# Patient Record
Sex: MALE | Race: WHITE | Employment: OTHER | URBAN - METROPOLITAN AREA
[De-identification: names, ages, dates, MRNs, and addresses within clinical notes are randomized per-mention and may not be internally consistent; named-entity substitution may affect disease eponyms.]

---

## 2017-04-04 ENCOUNTER — HOSPITAL ENCOUNTER (OUTPATIENT)
Dept: OTHER | Age: 82
Discharge: HOME | End: 2017-04-04

## 2017-04-04 DIAGNOSIS — Z00.6 CLINICAL TRIAL EXAM: ICD-10-CM

## 2018-04-17 ENCOUNTER — HOSPITAL ENCOUNTER (OUTPATIENT)
Dept: OTHER | Age: 83
Discharge: HOME | End: 2018-04-17

## 2018-04-17 DIAGNOSIS — Z00.6 CLINICAL TRIAL EXAM: ICD-10-CM

## 2018-08-01 ENCOUNTER — HOSPITAL ENCOUNTER (OUTPATIENT)
Dept: OTHER | Age: 83
Discharge: HOME | End: 2018-08-01
Attending: INTERNAL MEDICINE | Admitting: INTERNAL MEDICINE

## 2018-08-01 DIAGNOSIS — S43.101A CLOSED DISLOCATION OF RIGHT CLAVICLE, INITIAL ENCOUNTER: ICD-10-CM

## 2018-08-30 ENCOUNTER — HOSPITAL ENCOUNTER (OUTPATIENT)
Dept: OTHER | Age: 83
Discharge: HOME | End: 2018-08-30
Attending: INTERNAL MEDICINE | Admitting: INTERNAL MEDICINE

## 2018-08-30 DIAGNOSIS — M54.32 BACK PAIN WITH LEFT-SIDED SCIATICA: ICD-10-CM

## 2019-04-05 ENCOUNTER — TELEPHONE (OUTPATIENT)
Dept: FAMILY MEDICINE CLINIC | Age: 84
End: 2019-04-05

## 2019-04-08 NOTE — TELEPHONE ENCOUNTER
CALLED AND SPOKE TO WIFE AND SHE WILL CALL THE FRONT OFFICE AND SCHEDULE NEW PATIENT APPT FOR HER .  SC

## 2019-04-25 ENCOUNTER — HOSPITAL ENCOUNTER (EMERGENCY)
Age: 84
Discharge: HOME | End: 2019-04-25
Attending: EMERGENCY MEDICINE
Payer: MEDICARE

## 2019-04-25 VITALS
OXYGEN SATURATION: 95 % | BODY MASS INDEX: 37.05 KG/M2 | SYSTOLIC BLOOD PRESSURE: 151 MMHG | TEMPERATURE: 98.8 F | DIASTOLIC BLOOD PRESSURE: 71 MMHG | HEART RATE: 85 BPM | HEIGHT: 64 IN | WEIGHT: 217 LBS | RESPIRATION RATE: 16 BRPM

## 2019-04-25 DIAGNOSIS — R60.9 PERIPHERAL EDEMA: Primary | ICD-10-CM

## 2019-04-25 LAB
A/G RATIO: 1.3 (ref 1.1–2.2)
ALBUMIN SERPL-MCNC: 4.1 G/DL (ref 3.4–5)
ALP BLD-CCNC: 54 U/L (ref 40–129)
ALT SERPL-CCNC: 18 U/L (ref 10–40)
ANION GAP SERPL CALCULATED.3IONS-SCNC: 10 MMOL/L (ref 3–16)
AST SERPL-CCNC: 19 U/L (ref 15–37)
BILIRUB SERPL-MCNC: 0.5 MG/DL (ref 0–1)
BUN BLDV-MCNC: 19 MG/DL (ref 7–20)
CALCIUM SERPL-MCNC: 9.1 MG/DL (ref 8.3–10.6)
CHLORIDE BLD-SCNC: 104 MMOL/L (ref 99–110)
CO2: 29 MMOL/L (ref 21–32)
CREAT SERPL-MCNC: 0.9 MG/DL (ref 0.8–1.3)
GFR AFRICAN AMERICAN: >60
GFR NON-AFRICAN AMERICAN: >60
GLOBULIN: 3.2 G/DL
GLUCOSE BLD-MCNC: 102 MG/DL (ref 70–99)
POTASSIUM SERPL-SCNC: 4.4 MMOL/L (ref 3.5–5.1)
SODIUM BLD-SCNC: 143 MMOL/L (ref 136–145)
TOTAL PROTEIN: 7.3 G/DL (ref 6.4–8.2)

## 2019-04-25 PROCEDURE — 99283 EMERGENCY DEPT VISIT LOW MDM: CPT

## 2019-04-25 PROCEDURE — 80053 COMPREHEN METABOLIC PANEL: CPT

## 2019-04-25 RX ORDER — FUROSEMIDE 20 MG/1
TABLET ORAL
COMMUNITY
Start: 2019-04-04 | End: 2019-06-25 | Stop reason: SDUPTHER

## 2019-04-25 ASSESSMENT — ENCOUNTER SYMPTOMS
BACK PAIN: 0
NAUSEA: 0
COLOR CHANGE: 0
CONSTIPATION: 0
DIARRHEA: 0
VOMITING: 0
ABDOMINAL PAIN: 0
SHORTNESS OF BREATH: 0

## 2019-04-25 NOTE — ED PROVIDER NOTES
[address] [facility]  eMERGENCY dEPARTMENT eNCOUnter        Pt Name: Jeremias Cardona  MRN: [id number]  Armsirinagfrenato 5/14/1930  Date of evaluation: 4/25/2019  Provider: MARCIE Lr  PCP: Reese Ceballos DO  ED Attending: Eagle Holliday MD    CHIEF COMPLAINT       Chief Complaint   Patient presents with    Leg Swelling     pt with c/o bi-lateral feet/leg swelling ongoing for past few weeks- has PCP appt on Tues. states started on Lasix recently- feels it was too late of a start - seeing Dr. Meg Burks now- was [facility]- no c/o sob or pain        HISTORY OF PRESENT ILLNESS   (Location/Symptom, Timing/Onset, Context/Setting, Quality, Duration, Modifying Factors, Severity)  Note limiting factors. Jeremias Cardona is a 80 y.o. male who presents to the emergency department with complaints of bilateral lower extremity swelling that has worsened over the past few weeks. He has had recent change from hydrochlorothiazide, 20 mg to Lasix 20 mg daily. Followed up with his PCP which was an nurse practitioner and states blood work was obtained and this is when he was started on a diuretic. Has a follow-up appointment coming up next week with Dr. Pro Benson. Denies any symptoms associated with the lateral lower extremity swelling. No erythema, warmth or unilateral leg swelling. Denies chest pain or shortness of breath. He states that the swelling seems to improve if he keeps his legs elevated. No prior history of congestive heart failure. Nursing Notes were all reviewed and agreed with or any disagreements were addressed  in the HPI. REVIEW OF SYSTEMS    (2-9 systems for level 4, 10 or more for level 5)     Review of Systems   Constitutional: Negative for fever. Respiratory: Negative for shortness of breath. Cardiovascular: Positive for leg swelling (bilateral). Negative for chest pain. Gastrointestinal: Negative for abdominal pain, constipation, diarrhea, nausea and vomiting. Musculoskeletal: Negative for back pain. Skin: Negative for color change and rash. All other systems reviewed and are negative. Positives and pertinent negatives as per HPI. All other systems were reviewed and are negative. PHYSICAL EXAM    (up to 7 for level 4, 8 or more for level 5)     ED Triage Vitals [04/25/19 1635]   BP Temp Temp Source Pulse Resp SpO2 Height Weight   (!) 157/75 98.8 °F (37.1 °C) Infrared 85 16 97 % 5' 4\" (1.626 m) 217 lb (98.4 kg)       Physical Exam   Constitutional: He is oriented to person, place, and time. He appears well-developed and well-nourished. He is active and cooperative. Non-toxic appearance. HENT:   Head: Normocephalic. Right Ear: External ear normal.   Left Ear: External ear normal.   Nose: Nose normal.   Eyes: Conjunctivae are normal. Right eye exhibits no discharge. Left eye exhibits no discharge. Neck: Normal range of motion. Neck supple. Cardiovascular: Normal rate, regular rhythm and normal heart sounds. Exam reveals no gallop and no friction rub. No murmur heard. 2+ pitting edema below the knee bilaterally. Pulmonary/Chest: Effort normal and breath sounds normal. No stridor. No respiratory distress. He has no wheezes. He has no rales. Abdominal: Soft. Bowel sounds are normal. He exhibits no distension and no mass. There is no tenderness. There is no guarding. Musculoskeletal: Normal range of motion. Neurological: He is alert and oriented to person, place, and time. Skin: Skin is warm and dry. He is not diaphoretic. No pallor. No erythema or warmth to bilateral lower extremities. Psychiatric: He has a normal mood and affect. His behavior is normal.   Nursing note and vitals reviewed.       PAST MEDICAL HISTORY     Past Medical History:   Diagnosis Date    Afib ([facility] [address]     Anxiety     Atrial fibrillation (HCC)     Benign essential hypertension     Cervical back pain with evidence of disc disease     Cramps, extremity     Elevated prostate specific antigen (PSA)     history of    Emphysema     Hypercholesteremia     Kidney stones        SURGICAL HISTORY       Past Surgical History:   Procedure Laterality Date    APPENDECTOMY      COLONOSCOPY      EYE SURGERY      cataract with lens implant    HERNIA REPAIR      rt side    KNEE ARTHROSCOPY      THROAT SURGERY      uvula resected, and nasal surgery for sleep apnea    TURP  2007    VASECTOMY         CURRENT MEDICATIONS       Previous Medications    ALBUTEROL (PROAIR HFA) 108 (90 BASE) MCG/ACT INHALER    Inhale 2 puffs into the lungs every 6 hours as needed. BRIMONIDINE (ALPHAGAN P) 0.1 % SOLN    1 drop every 8 hours    BRINZOLAMIDE-BRIMONIDINE 1-0.2 % SUSP    Apply 1 drop to eye 2 times daily Both eyes    FEBUXOSTAT (ULORIC) 80 MG TABS    Take by mouth    FUROSEMIDE (LASIX) 20 MG TABLET        HYDROCODONE-ACETAMINOPHEN (NORCO) 5-325 MG PER TABLET    Take 1 tablet by mouth every 6 hours as needed for Pain. IPRATROPIUM-ALBUTEROL (DUONEB) 0.5-2.5 (3) MG/3ML SOLN NEBULIZER SOLUTION    Take 3 mLs by nebulization every 6 hours as needed for Shortness of Breath. LATANOPROST (XALATAN) 0.005 % OPHTHALMIC SOLUTION    Place 1 drop into both eyes nightly. METOPROLOL TARTRATE (LOPRESSOR) 50 MG TABLET    Take 50 mg by mouth 2 times daily    MONTELUKAST (SINGULAIR) 10 MG TABLET    Take 10 mg by mouth nightly    NAPROXEN (NAPROSYN) 500 MG TABLET    Take 1 tablet by mouth 2 times daily (with meals) As needed for heel pain. NUTRITIONAL SUPPLEMENTS (THERALITH XR PO)    Take 2 tablets by mouth 2 times daily.     POTASSIUM CHLORIDE SA (K-DUR;KLOR-CON M) 10 MEQ TABLET    TAKE 1 TABLET BY MOUTH DAILY    PRAVASTATIN (PRAVACHOL) 40 MG TABLET    TAKE 1 TABLET BY MOUTH EVERY OTHER DAY    PROBENECID (BENEMID) 500 MG TABLET    Take 500 mg by mouth 2 times daily     TRAMADOL-ACETAMINOPHEN (ULTRACET) 37.5-325 MG PER TABLET    TAKE 1 TABLET BY MOUTH THREE TIMES DAILY AS NEEDED FOR PAIN areinterpreted by the Emergency Department Physician who either signs or Co-signs this chart in the absence of a cardiologist.    RADIOLOGY:   Non-plain film images such as CT, Ultrasound and MRI are read by the radiologist. Shaylee Mora radiographicimages are visualized and preliminarily interpreted by the  ED Provider with the below findings:    Interpretation per the Radiologist below, if available at the time of this note:    No orders to display     No results found. PROCEDURES   Unless otherwisenoted below, none     Procedures    CRITICAL CARE TIME   N/A    CONSULTS:  None    EMERGENCY DEPARTMENT COURSE andDIFFERENTIAL DIAGNOSIS/MDM:   Vitals:    Vitals:    04/25/19 1635 04/25/19 1637 04/25/19 1659 04/25/19 1700   BP: (!) 157/75 (!) 181/69 (!) 167/83 (!) 157/75   Pulse: 85      Resp: 16      Temp: 98.8 °F (37.1 °C)      TempSrc: Infrared      SpO2: 97% 96% 96% 96%   Weight: 217 lb (98.4 kg)      Height: 5' 4\" (1.626 m)          Patient wasgiven the following medications:  Medications - No data to display  Patient presents emergency Department with complaints of bilateral lower extremity edema. CMP is checked and normal albumin and normal renal function. Patient is to increase his Lasix to 40 mg once daily over the next 5 days and purchase compression stockings. Encouraged to elevate legs. He is not having any chest pain or shortness of breath. Patient is appropriate for outpatient follow-up with PCP. Edema instructions for home. Vitals are currently stable for discharge. The patient tolerated their visit well. They were seen and evaluated by the attending physician, who agreed with the assessment and plan. I have discussed the findings of today's workup with the patient and addressed the patient's questions and concerns. Important warning signs as well as new orworsening symptoms which would necessitate immediate return to the ED were discussed.  The plan is to discharge from the ED at this time, and the patient is in stable condition. The patient acknowledged understanding isagreeable with this plan. FINAL IMPRESSION      1. Peripheral edema        DISPOSITION/PLAN   DISPOSITION Decision To Discharge 04/25/2019 06:03:55 PM      PATIENT REFERRED TO:  keep your scheduled appointment for this coming week.           [facility] Emergency Department  [place] [address]  [phone]  Go to   If symptoms worsen      DISCHARGE MEDICATIONS:  New Prescriptions    No medications on file       DISCONTINUED MEDICATIONS:  Discontinued Medications    No medications on file            (Please note that portions of this note were completed with a voice recognition program.  Efforts were made to edit the dictations but occasionally words aremis-transcribed.)    MARCIE Hartman (electronically signed)            MARCIE Whitley  04/25/19 8874

## 2019-04-25 NOTE — ED NOTES
Pt discharged in stable condition, VSS, no signs of distress. Discharge instructions and meds reviewed. Pt verbalizes understanding and states no further questions or concerns unaddressed.        Dinorah Dobbins RN  04/25/19 7499

## 2019-04-25 NOTE — ED PROVIDER NOTES
This patient was seen by the Mid-Level Provider. I have seen and examined the patient, agree with the workup, evaluation, management and diagnosis. Care plan has been discussed. My assessment reveals     An 51-year-old male who presents with lower extremity edema. The patient presents with symmetric bilateral lower extremity edema. He denies shortness of breath. He states his diuretics were changed several weeks ago. Exam: Bilateral symmetric lower extremity edema. 1+ pitting. Labs are obtained are unremarkable. Disposition: He'll be discharged with instructions to keep his legs up. He was given instructions to use compression stockings. He is to return if worse and follow-up with his doctor for appropriate diuretic control. Results for orders placed or performed during the hospital encounter of 04/25/19   Comprehensive Metabolic Panel   Result Value Ref Range    Sodium 143 136 - 145 mmol/L    Potassium 4.4 3.5 - 5.1 mmol/L    Chloride 104 99 - 110 mmol/L    CO2 29 21 - 32 mmol/L    Anion Gap 10 3 - 16    Glucose 102 (H) 70 - 99 mg/dL    BUN 19 7 - 20 mg/dL    CREATININE 0.9 0.8 - 1.3 mg/dL    GFR Non-African American >60 >60    GFR African American >60 >60    Calcium 9.1 8.3 - 10.6 mg/dL    Total Protein 7.3 6.4 - 8.2 g/dL    Alb 4.1 3.4 - 5.0 g/dL    Albumin/Globulin Ratio 1.3 1.1 - 2.2    Total Bilirubin 0.5 0.0 - 1.0 mg/dL    Alkaline Phosphatase 54 40 - 129 U/L    ALT 18 10 - 40 U/L    AST 19 15 - 37 U/L    Globulin 3.2 g/dL     No results found.         Aury Encinas MD  04/25/19 3963

## 2019-04-25 NOTE — CARE COORDINATION
Discharge Planning:  SW met with pt and spouse to discuss discharge needs. Pt stated he worked at [employer] and has been seeing advertisements on TV about getting assistance for workers. SW informed I have a flyer with the information and phone number. Pt stated he will get it the next time he sees it on TV. Spouse reported no needs at home at this time but stated she would like a phone number to call if needs arise in the future. SW explained the Elderly Services Program and provided spouse with number. Pt and spouse reported no other needs at this time.     Electronically signed by JESÚS Salazar, NADINE on 4/25/2019 at 6:29 PM

## 2019-04-30 ENCOUNTER — HOSPITAL ENCOUNTER (OUTPATIENT)
Dept: GENERAL RADIOLOGY | Age: 84
Discharge: HOME | End: 2019-04-30
Payer: MEDICARE

## 2019-04-30 ENCOUNTER — HOSPITAL ENCOUNTER (OUTPATIENT)
Age: 84
Discharge: HOME | End: 2019-04-30
Payer: MEDICARE

## 2019-04-30 ENCOUNTER — OFFICE VISIT (OUTPATIENT)
Dept: FAMILY MEDICINE CLINIC | Age: 84
End: 2019-04-30
Payer: MEDICARE

## 2019-04-30 VITALS
SYSTOLIC BLOOD PRESSURE: 134 MMHG | OXYGEN SATURATION: 98 % | HEIGHT: 64 IN | WEIGHT: 216 LBS | HEART RATE: 86 BPM | BODY MASS INDEX: 36.88 KG/M2 | DIASTOLIC BLOOD PRESSURE: 80 MMHG | RESPIRATION RATE: 16 BRPM

## 2019-04-30 DIAGNOSIS — Z91.81 AT HIGH RISK FOR FALLS: ICD-10-CM

## 2019-04-30 DIAGNOSIS — R97.20 HIGH PROSTATE SPECIFIC ANTIGEN (PSA): ICD-10-CM

## 2019-04-30 DIAGNOSIS — E79.0 HYPERURICEMIA: ICD-10-CM

## 2019-04-30 DIAGNOSIS — N40.0 BENIGN PROSTATIC HYPERPLASIA, UNSPECIFIED WHETHER LOWER URINARY TRACT SYMPTOMS PRESENT: ICD-10-CM

## 2019-04-30 DIAGNOSIS — I10 ESSENTIAL HYPERTENSION: Primary | ICD-10-CM

## 2019-04-30 DIAGNOSIS — M25.551 RIGHT HIP PAIN: ICD-10-CM

## 2019-04-30 DIAGNOSIS — M79.642 LEFT HAND PAIN: ICD-10-CM

## 2019-04-30 DIAGNOSIS — M51.36 DDD (DEGENERATIVE DISC DISEASE), LUMBAR: ICD-10-CM

## 2019-04-30 DIAGNOSIS — M79.89 LEG SWELLING: ICD-10-CM

## 2019-04-30 DIAGNOSIS — H91.93 BILATERAL HEARING LOSS, UNSPECIFIED HEARING LOSS TYPE: ICD-10-CM

## 2019-04-30 PROCEDURE — G8427 DOCREV CUR MEDS BY ELIG CLIN: HCPCS | Performed by: FAMILY MEDICINE

## 2019-04-30 PROCEDURE — 4040F PNEUMOC VAC/ADMIN/RCVD: CPT | Performed by: FAMILY MEDICINE

## 2019-04-30 PROCEDURE — 1036F TOBACCO NON-USER: CPT | Performed by: FAMILY MEDICINE

## 2019-04-30 PROCEDURE — G8417 CALC BMI ABV UP PARAM F/U: HCPCS | Performed by: FAMILY MEDICINE

## 2019-04-30 PROCEDURE — 73502 X-RAY EXAM HIP UNI 2-3 VIEWS: CPT

## 2019-04-30 PROCEDURE — 73130 X-RAY EXAM OF HAND: CPT

## 2019-04-30 PROCEDURE — 1123F ACP DISCUSS/DSCN MKR DOCD: CPT | Performed by: FAMILY MEDICINE

## 2019-04-30 PROCEDURE — 99203 OFFICE O/P NEW LOW 30 MIN: CPT | Performed by: FAMILY MEDICINE

## 2019-04-30 RX ORDER — AMLODIPINE BESYLATE 10 MG/1
10 TABLET ORAL DAILY
COMMUNITY
End: 2019-06-25

## 2019-04-30 RX ORDER — NIACIN 500 MG
500 TABLET ORAL
COMMUNITY
End: 2019-06-25

## 2019-04-30 ASSESSMENT — PATIENT HEALTH QUESTIONNAIRE - PHQ9
1. LITTLE INTEREST OR PLEASURE IN DOING THINGS: 0
SUM OF ALL RESPONSES TO PHQ QUESTIONS 1-9: 0
SUM OF ALL RESPONSES TO PHQ9 QUESTIONS 1 & 2: 0
SUM OF ALL RESPONSES TO PHQ QUESTIONS 1-9: 0
2. FEELING DOWN, DEPRESSED OR HOPELESS: 0

## 2019-04-30 NOTE — PROGRESS NOTES
2019    David Hewitt (:  1930) is a 80 y.o. male, here for evaluation of the following medical concerns:    HPI  Chief Complaint   Patient presents with    New Patient     NEW PATIENT [facility] [address] WORKED THERE, TREATED FOR HTN HYPERLIPIDEMIA PREVIOUS DOCTOR CONTINUES TO CHANGE HIS MEDICATION AND NEVER GIVEN REASONS WHY     Pain     PAIN AND EDEMA IN LEFT HAND FOR 2 YEARS JUST KEEPS GETTING TOLD IT IS ARTHRITIS BUT NO ONE DOES ANYTHING HE HAS PAIN IN FEET AND ANKLES WENT TO ER    PAIN IN HAND - WEATHER RELATED - MORE ACHING LAST 3 DAYS  NO HX OF HEART PROBLEMS - SEEN BY DR. Rocio Sow  SEEN BY DR. Valencia Stockton - WIFE PT HERE -SEEN BY NURSE PRACTITIONER  RIGHT HANDED - NO HX OF CHF - NO SOB - INTERESTED IN GETTING STOCKINGS  DEVELOPED SWELLING IN BOTH LEGS in last 3-4 weeks - went to ER  - ? Related to naproxen  Took naproxen for gout - base of big toes - last flare 6 months ago  HIGH PSA  holger diet. Mows grass - tries to stay active during day - sits in chair and elevates legs  Right hip pain - bothered by activity - walks on treadmill and does okay w/ elevation - problems if walks outside much. On furosemide 20/d prior to swelling - no improvement in swelling but urinating a lot. Urinating hourly after taking lasix   High psa for years - had biopsies in past - negative - psa stays around 8-9.  bm's good on miralax - neg fit testing - told too old for colonoscopy  Drinks a lot of coffee.   Vision fairly good - has glaucoma - eye drops - s/p cataract  No breathing problems - had oxygen rx'd - doesn't use - uses nebulizer prn -not very often  Never smoked  BP Readings from Last 3 Encounters:   19 134/80   19 (!) 151/71   09/15/17 (!) 151/69     Pulse Readings from Last 3 Encounters:   19 86   19 85   09/15/17 70     Wt Readings from Last 3 Encounters:   19 216 lb (98 kg)   19 217 lb (98.4 kg)   09/15/17 226 lb (102.5 kg) Current Outpatient Medications   Medication Sig Dispense Refill    amLODIPine (NORVASC) 10 MG tablet Take 10 mg by mouth daily      niacin 500 MG tablet Take 500 mg by mouth daily (with breakfast)      furosemide (LASIX) 20 MG tablet       Febuxostat (ULORIC) 80 MG TABS Take by mouth      probenecid (BENEMID) 500 MG tablet Take 500 mg by mouth 2 times daily       pravastatin (PRAVACHOL) 40 MG tablet TAKE 1 TABLET BY MOUTH EVERY OTHER DAY 45 tablet 0    potassium chloride SA (K-DUR;KLOR-CON M) 10 MEQ tablet TAKE 1 TABLET BY MOUTH DAILY 30 tablet 0    naproxen (NAPROSYN) 500 MG tablet Take 1 tablet by mouth 2 times daily (with meals) As needed for heel pain. 60 tablet 11    ipratropium-albuterol (DUONEB) 0.5-2.5 (3) MG/3ML SOLN nebulizer solution Take 3 mLs by nebulization every 6 hours as needed for Shortness of Breath. 360 mL 1    latanoprost (XALATAN) 0.005 % ophthalmic solution Place 1 drop into both eyes nightly. No current facility-administered medications for this visit. Review of Systems  Hx of afib - never had sx. No cp/palp - denies dizzy/ lightheaded except sudden movement  Fell and hit shoulder on left recently - tore muscle loose  Lives w/ wife  Sleeps well - surgery for dnia - sleeps in lazy boy   Gets nasal congestion w/ lying flat - uses breathe right strips. Prior to Visit Medications    Medication Sig Taking?  Authorizing Provider   amLODIPine (NORVASC) 10 MG tablet Take 10 mg by mouth daily Yes Historical Provider, MD   niacin 500 MG tablet Take 500 mg by mouth daily (with breakfast) Yes Historical Provider, MD   furosemide (LASIX) 20 MG tablet  Yes Historical Provider, MD   Febuxostat (ULORIC) 80 MG TABS Take by mouth Yes Historical Provider, MD   probenecid (BENEMID) 500 MG tablet Take 500 mg by mouth 2 times daily  Yes Historical Provider, MD   pravastatin (PRAVACHOL) 40 MG tablet TAKE 1 TABLET BY MOUTH EVERY OTHER DAY Yes Zhanna Tamayo, APRN - CNP   potassium chloride SA (K-DUR;KLOR-CON M) 10 MEQ tablet TAKE 1 TABLET BY MOUTH DAILY Yes SHANDA Mckinley - CNP   naproxen (NAPROSYN) 500 MG tablet Take 1 tablet by mouth 2 times daily (with meals) As needed for heel pain. Yes Layla Romero MD   ipratropium-albuterol (DUONEB) 0.5-2.5 (3) MG/3ML SOLN nebulizer solution Take 3 mLs by nebulization every 6 hours as needed for Shortness of Breath. Yes Layla Romero MD   latanoprost (XALATAN) 0.005 % ophthalmic solution Place 1 drop into both eyes nightly. Yes Historical Provider, MD    eats out a lot - wife cooks some - likes fish a lot.     Allergies   Allergen Reactions    Codeine      headache    Timolol Maleate      Short of breath       Past Medical History:   Diagnosis Date    Afib ([facility] [address]     Anxiety     Atrial fibrillation (HCC)     Benign essential hypertension     Cervical back pain with evidence of disc disease     Cramps, extremity     Elevated prostate specific antigen (PSA)     history of    Emphysema     Hypercholesteremia     Kidney stones        Past Surgical History:   Procedure Laterality Date    APPENDECTOMY      COLONOSCOPY      EYE SURGERY      cataract with lens implant    HERNIA REPAIR      rt side    KNEE ARTHROSCOPY      THROAT SURGERY      uvula resected, and nasal surgery for sleep apnea    TURP  2007    VASECTOMY         Social History     Socioeconomic History    Marital status:      Spouse name: Not on file    Number of children: Not on file    Years of education: Not on file    Highest education level: Not on file   Occupational History    Not on file   Social Needs    Financial resource strain: Not on file    Food insecurity:     Worry: Not on file     Inability: Not on file    Transportation needs:     Medical: Not on file     Non-medical: Not on file   Tobacco Use    Smoking status: Never Smoker    Smokeless tobacco: Never Used   Substance and Sexual Activity    Alcohol use: No     Alcohol/week: 0.0 oz    Drug use: No    Sexual activity: Never     Partners: Female   Lifestyle    Physical activity:     Days per week: Not on file     Minutes per session: Not on file    Stress: Not on file   Relationships    Social connections:     Talks on phone: Not on file     Gets together: Not on file     Attends Nondenominational service: Not on file     Active member of club or organization: Not on file     Attends meetings of clubs or organizations: Not on file     Relationship status: Not on file    Intimate partner violence:     Fear of current or ex partner: Not on file     Emotionally abused: Not on file     Physically abused: Not on file     Forced sexual activity: Not on file   Other Topics Concern    Not on file   Social History Narrative    Not on file        Family History   Problem Relation Age of Onset    Stroke Father 68       Vitals:    04/30/19 1035   BP: 134/80   Site: Left Upper Arm   Position: Sitting   Cuff Size: Medium Adult   Pulse: 86   Resp: 16   SpO2: 98%   Weight: 216 lb (98 kg)   Height: 5' 4\" (1.626 m)     Estimated body mass index is 37.08 kg/m² as calculated from the following:    Height as of this encounter: 5' 4\" (1.626 m). Weight as of this encounter: 216 lb (98 kg). Physical Exam   Constitutional: He appears well-developed. No distress. HENT:   Mouth/Throat: Oropharynx is clear and moist.   Eyes: Conjunctivae are normal. No scleral icterus. Cardiovascular: Normal rate, regular rhythm, normal heart sounds and intact distal pulses. Exam reveals no gallop. No murmur heard. Pulmonary/Chest: Effort normal and breath sounds normal. No respiratory distress. He has no wheezes. He has no rhonchi. He has no rales. Abdominal: Soft. Bowel sounds are normal. He exhibits no distension. There is no tenderness. Musculoskeletal: He exhibits edema (1-2+ swelling low legs bilat). Swelling/ ttp left hand   Sl antalgic gait   Neurological: He is alert. Skin: Skin is intact. No rash noted. No erythema. Psychiatric: He has a normal mood and affect. ASSESSMENT/PLAN:  There are no diagnoses linked to this encounter. Manas Weiss was seen today for new patient and pain. Diagnoses and all orders for this visit:    Essential hypertension  Stable on lasix/ norvasc  At high risk for falls  Fall precautions dw pt  High prostate specific antigen (PSA)  Stable bx negative  Benign prostatic hyperplasia, unspecified whether lower urinary tract symptoms present  Stable sp turp  Leg swelling  Compression stockings - cont lasix 20/d  Consider change amlodipine to bb/ acei if swelling still problematic - getting better w/ reduction in nsaids  Elevate/ low salt encouraged  Left hand pain  Suspect oa - await xray/ labs  -consider diclofenac  Right hip pain  Xray - suspect oa  DDD (degenerative disc disease), lumbar  Ultram prn  Bilateral hearing loss, unspecified hearing loss type  Per ent - cochlear implant  Hyperuricemia  Start uloric daily   High cholesterol - stop niacin - cont pravastatin based on last year results - [facility]    No follow-ups on file. An  electronic signature was used to authenticate this note. --Jenelle Nuñez MD on 4/30/2019 at 11:19 AM    On the basis of positive falls risk screening, assessment and plan is as follows: dw/ pt safety issues.

## 2019-05-03 ENCOUNTER — TELEPHONE (OUTPATIENT)
Dept: INTERNAL MEDICINE CLINIC | Age: 84
End: 2019-05-03

## 2019-05-03 NOTE — TELEPHONE ENCOUNTER
PA SUBMITTED FOR Diclofenac Sodium 1% TD GEL  Key: [id number] - PA Case ID: [id number]   STATUS: PENDING

## 2019-05-06 NOTE — TELEPHONE ENCOUNTER
Received APPROVAL for Diclofenac Sodium 1% TD GEL through 12/31/2019. Please advise patient. Thank you.

## 2019-05-09 LAB — PROSTATE SPECIFIC ANTIGEN: 11.28 NG/ML

## 2019-06-25 ENCOUNTER — OFFICE VISIT (OUTPATIENT)
Dept: FAMILY MEDICINE CLINIC | Age: 84
End: 2019-06-25
Payer: MEDICARE

## 2019-06-25 VITALS
SYSTOLIC BLOOD PRESSURE: 138 MMHG | WEIGHT: 212 LBS | BODY MASS INDEX: 36.19 KG/M2 | HEART RATE: 80 BPM | HEIGHT: 64 IN | RESPIRATION RATE: 14 BRPM | DIASTOLIC BLOOD PRESSURE: 82 MMHG

## 2019-06-25 DIAGNOSIS — J96.11 CHRONIC RESPIRATORY FAILURE WITH HYPOXIA (HCC): ICD-10-CM

## 2019-06-25 DIAGNOSIS — I10 ESSENTIAL HYPERTENSION: Primary | ICD-10-CM

## 2019-06-25 DIAGNOSIS — M19.049 HAND ARTHRITIS: ICD-10-CM

## 2019-06-25 DIAGNOSIS — L30.9 DERMATITIS: ICD-10-CM

## 2019-06-25 DIAGNOSIS — E78.00 HYPERCHOLESTEREMIA: ICD-10-CM

## 2019-06-25 DIAGNOSIS — J98.6 DIAPHRAGM PARALYSIS: ICD-10-CM

## 2019-06-25 DIAGNOSIS — R60.0 PEDAL EDEMA: ICD-10-CM

## 2019-06-25 DIAGNOSIS — J44.9 COPD, MILD (HCC): ICD-10-CM

## 2019-06-25 PROCEDURE — 1123F ACP DISCUSS/DSCN MKR DOCD: CPT | Performed by: FAMILY MEDICINE

## 2019-06-25 PROCEDURE — G8926 SPIRO NO PERF OR DOC: HCPCS | Performed by: FAMILY MEDICINE

## 2019-06-25 PROCEDURE — 3023F SPIROM DOC REV: CPT | Performed by: FAMILY MEDICINE

## 2019-06-25 PROCEDURE — 4040F PNEUMOC VAC/ADMIN/RCVD: CPT | Performed by: FAMILY MEDICINE

## 2019-06-25 PROCEDURE — G8417 CALC BMI ABV UP PARAM F/U: HCPCS | Performed by: FAMILY MEDICINE

## 2019-06-25 PROCEDURE — 99214 OFFICE O/P EST MOD 30 MIN: CPT | Performed by: FAMILY MEDICINE

## 2019-06-25 PROCEDURE — 1036F TOBACCO NON-USER: CPT | Performed by: FAMILY MEDICINE

## 2019-06-25 PROCEDURE — G8427 DOCREV CUR MEDS BY ELIG CLIN: HCPCS | Performed by: FAMILY MEDICINE

## 2019-06-25 RX ORDER — OLMESARTAN MEDOXOMIL AND HYDROCHLOROTHIAZIDE 40/25 40; 25 MG/1; MG/1
1 TABLET ORAL DAILY
Qty: 30 TABLET | Refills: 5 | Status: SHIPPED | OUTPATIENT
Start: 2019-06-25 | End: 2019-07-18

## 2019-06-25 RX ORDER — PRAVASTATIN SODIUM 40 MG
TABLET ORAL
Qty: 45 TABLET | Refills: 11 | Status: SHIPPED | OUTPATIENT
Start: 2019-06-25 | End: 2020-07-06

## 2019-06-25 RX ORDER — POTASSIUM CHLORIDE 750 MG/1
TABLET, EXTENDED RELEASE ORAL
Qty: 30 TABLET | Refills: 0 | Status: SHIPPED | OUTPATIENT
Start: 2019-06-25 | End: 2019-07-18 | Stop reason: SDUPTHER

## 2019-06-25 RX ORDER — FUROSEMIDE 20 MG/1
TABLET ORAL
Qty: 30 TABLET | Refills: 0 | Status: SHIPPED | OUTPATIENT
Start: 2019-06-25 | End: 2019-07-30 | Stop reason: SDUPTHER

## 2019-06-25 NOTE — PROGRESS NOTES
Subjective:      Patient ID: Leon Rose is a 80 y.o. male. HPI  Chief Complaint   Patient presents with    Hypertension     [facility] MEDICATIONS WITH [address] HE IS NOT SURE WHAT HE NEEDS TO STAY ON     Other     HE DOES NOT WANT TO DO AWV, HE IS ON [facility] AND GETS A COMPLETE PHYSICAL EVERY YEAR      BP Readings from Last 3 Encounters:   06/25/19 138/82   04/30/19 134/80   04/25/19 (!) 151/71     Pulse Readings from Last 3 Encounters:   06/25/19 80   04/30/19 86   04/25/19 85     Wt Readings from Last 3 Encounters:   06/25/19 212 lb (96.2 kg)   04/30/19 216 lb (98 kg)   04/25/19 217 lb (98.4 kg)     Current Outpatient Medications   Medication Sig Dispense Refill    amLODIPine (NORVASC) 10 MG tablet Take 10 mg by mouth daily      niacin 500 MG tablet Take 500 mg by mouth daily (with breakfast)      diclofenac sodium 1 % GEL Apply 2 g topically 4 times daily 1 Tube 1    furosemide (LASIX) 20 MG tablet       Febuxostat (ULORIC) 80 MG TABS Take by mouth      probenecid (BENEMID) 500 MG tablet Take 500 mg by mouth 2 times daily       pravastatin (PRAVACHOL) 40 MG tablet TAKE 1 TABLET BY MOUTH EVERY OTHER DAY 45 tablet 0    potassium chloride SA (K-DUR;KLOR-CON M) 10 MEQ tablet TAKE 1 TABLET BY MOUTH DAILY 30 tablet 0    naproxen (NAPROSYN) 500 MG tablet Take 1 tablet by mouth 2 times daily (with meals) As needed for heel pain. 60 tablet 11    ipratropium-albuterol (DUONEB) 0.5-2.5 (3) MG/3ML SOLN nebulizer solution Take 3 mLs by nebulization every 6 hours as needed for Shortness of Breath. 360 mL 1    latanoprost (XALATAN) 0.005 % ophthalmic solution Place 1 drop into both eyes nightly. No current facility-administered medications for this visit.     HAD recent labs done - fernald  Off potassium for awhile  On pravastatin / niacin  Urination about the same - a lot of swelling in feet/ legs - compression stocking helps - w/o stocking -burning sensation in shin  Swelling left hand and stiff -not painful to touch - wears glove at night   Itching left upper back - scratched - gold bond helped -no where else - not recurrent. Lays on back at night - if lays on left side - feels like air trapped and burps 4-5x then feels okay. occ trouble breathing - used nebulizer 3x in last month - uses prn - this helps but doesn't need often  Trouble filling whole lung up - basilar scar on cxr - unchanged. Good shape for 89  Hx of diaphragmatic paralysis - left shalini diaphragm - uses albuterol prn  Does exercises daily which help oa/ pain bereket in left shoulder  Review of Systems  Eye pressures stable - seeing eye doctor regularly for glaucoma. No hx of cardiac issues -no cp/palp  occ head itching bereket at night when lies down  Objective:   Physical Exam   Constitutional: He appears well-developed. No distress. HENT:   Mouth/Throat: Oropharynx is clear and moist.   Eyes: Conjunctivae are normal. No scleral icterus. Cardiovascular: Normal rate, regular rhythm, normal heart sounds and intact distal pulses. Exam reveals no gallop. No murmur heard. Pulmonary/Chest: Effort normal and breath sounds normal. No respiratory distress. He has no wheezes. He has no rhonchi. He has no rales. Abdominal: Soft. Bowel sounds are normal. He exhibits no distension. There is no tenderness. Musculoskeletal: He exhibits no edema (1+ edema low legs/ ankles/ feet). Neurological: He is alert. Skin: Skin is intact. No rash noted. No erythema. Psychiatric: He has a normal mood and affect. Assessment:       Diagnosis Orders   1. Essential hypertension     2. Diaphragm paralysis     3. Pedal edema     4. Dermatitis     5. Hand arthritis     6.  Hypercholesteremia             Plan:      alleve 440 bid w/ food for oa in hands - gi precautions  Lasix/ kcl together prn for swelling w/ compression/ elevation - watch sodium  Ultram prn severe pain    Change amlodipine back to arb/ hctz 2/2 swelling  Gold bond prn for healing rash on upper shoulder  Monitor bp w/ bp changes  Cont pravastatin - stop niacin  Labs/ w/u from [place] reviewed w/ pt  F/u 2-3 months/ prn pending bp readings - consider bmp at that time    Latanya Ac MD

## 2019-07-18 ENCOUNTER — TELEPHONE (OUTPATIENT)
Dept: FAMILY MEDICINE CLINIC | Age: 84
End: 2019-07-18

## 2019-07-18 RX ORDER — TELMISARTAN AND HYDROCHLORTHIAZIDE 80; 25 MG/1; MG/1
1 TABLET ORAL DAILY
Qty: 30 TABLET | Refills: 5 | Status: SHIPPED | OUTPATIENT
Start: 2019-07-18 | End: 2019-07-22 | Stop reason: SDUPTHER

## 2019-07-18 NOTE — TELEPHONE ENCOUNTER
CALLED AND SPOKE TO PATIENT WIFE AND ADVISED OF THE MEDICATION CHANGE UNTIL HIS ORIGINAL SCRIPT IS OFF BACKORDER.  SC

## 2019-07-30 RX ORDER — FUROSEMIDE 20 MG/1
TABLET ORAL
Qty: 30 TABLET | Refills: 0 | Status: SHIPPED | OUTPATIENT
Start: 2019-07-30 | End: 2019-08-27 | Stop reason: SDUPTHER

## 2019-08-27 ENCOUNTER — OFFICE VISIT (OUTPATIENT)
Dept: FAMILY MEDICINE CLINIC | Age: 84
End: 2019-08-27
Payer: MEDICARE

## 2019-08-27 VITALS
RESPIRATION RATE: 18 BRPM | BODY MASS INDEX: 36.88 KG/M2 | HEART RATE: 88 BPM | SYSTOLIC BLOOD PRESSURE: 136 MMHG | DIASTOLIC BLOOD PRESSURE: 78 MMHG | WEIGHT: 216 LBS | HEIGHT: 64 IN

## 2019-08-27 DIAGNOSIS — J44.9 COPD, MILD (HCC): ICD-10-CM

## 2019-08-27 DIAGNOSIS — L29.9 SCALP ITCH: ICD-10-CM

## 2019-08-27 DIAGNOSIS — R73.9 HYPERGLYCEMIA: ICD-10-CM

## 2019-08-27 DIAGNOSIS — I10 ESSENTIAL HYPERTENSION: Primary | ICD-10-CM

## 2019-08-27 DIAGNOSIS — R97.20 ELEVATED PROSTATE SPECIFIC ANTIGEN (PSA): ICD-10-CM

## 2019-08-27 DIAGNOSIS — R22.43 LOCALIZED SWELLING OF BOTH LOWER LEGS: ICD-10-CM

## 2019-08-27 DIAGNOSIS — M19.049 HAND ARTHRITIS: ICD-10-CM

## 2019-08-27 DIAGNOSIS — G89.29 CHRONIC RIGHT-SIDED LOW BACK PAIN WITHOUT SCIATICA: ICD-10-CM

## 2019-08-27 DIAGNOSIS — M54.50 CHRONIC RIGHT-SIDED LOW BACK PAIN WITHOUT SCIATICA: ICD-10-CM

## 2019-08-27 PROCEDURE — G8427 DOCREV CUR MEDS BY ELIG CLIN: HCPCS | Performed by: FAMILY MEDICINE

## 2019-08-27 PROCEDURE — G8926 SPIRO NO PERF OR DOC: HCPCS | Performed by: FAMILY MEDICINE

## 2019-08-27 PROCEDURE — G8417 CALC BMI ABV UP PARAM F/U: HCPCS | Performed by: FAMILY MEDICINE

## 2019-08-27 PROCEDURE — 3023F SPIROM DOC REV: CPT | Performed by: FAMILY MEDICINE

## 2019-08-27 PROCEDURE — 99214 OFFICE O/P EST MOD 30 MIN: CPT | Performed by: FAMILY MEDICINE

## 2019-08-27 PROCEDURE — 1036F TOBACCO NON-USER: CPT | Performed by: FAMILY MEDICINE

## 2019-08-27 PROCEDURE — 4040F PNEUMOC VAC/ADMIN/RCVD: CPT | Performed by: FAMILY MEDICINE

## 2019-08-27 PROCEDURE — 1123F ACP DISCUSS/DSCN MKR DOCD: CPT | Performed by: FAMILY MEDICINE

## 2019-08-27 RX ORDER — TRAMADOL HYDROCHLORIDE 50 MG/1
50 TABLET ORAL EVERY 6 HOURS PRN
COMMUNITY
End: 2019-08-27 | Stop reason: SDUPTHER

## 2019-08-27 RX ORDER — TRAMADOL HYDROCHLORIDE 50 MG/1
50 TABLET ORAL EVERY 6 HOURS PRN
Qty: 90 TABLET | Refills: 1 | Status: SHIPPED | OUTPATIENT
Start: 2019-08-27 | End: 2019-09-26

## 2019-08-27 RX ORDER — FUROSEMIDE 20 MG/1
TABLET ORAL
Qty: 30 TABLET | Refills: 5 | Status: SHIPPED | OUTPATIENT
Start: 2019-08-27 | End: 2020-02-26

## 2019-08-27 RX ORDER — KETOCONAZOLE 20 MG/ML
SHAMPOO TOPICAL
Qty: 1 BOTTLE | Refills: 2 | Status: SHIPPED | OUTPATIENT
Start: 2019-08-27 | End: 2020-03-03 | Stop reason: ALTCHOICE

## 2019-08-27 NOTE — PROGRESS NOTES
08/27/19 216 lb (98 kg)   06/25/19 212 lb (96.2 kg)   04/30/19 216 lb (98 kg)        Medication Review:  Current Outpatient Medications   Medication Sig Dispense Refill    traMADol (ULTRAM) 50 MG tablet Take 1 tablet by mouth every 6 hours as needed for Pain for up to 30 days. 90 tablet 1    ketoconazole (NIZORAL) 2 % shampoo Apply topically daily as needed. 1 Bottle 2    furosemide (LASIX) 20 MG tablet TAKE 1 TABLET BY MOUTH DAILY  FOR  SWELLING 30 tablet 5    telmisartan-hydrochlorothiazide (MICARDIS HCT) 80-25 MG per tablet TAKE 1 TABLET BY MOUTH DAILY 90 tablet 1    potassium chloride (KLOR-CON M) 10 MEQ extended release tablet TAKE 1 TABLET BY MOUTH DAILY WITH FUROSEMIDE AS NEEDED FOR SWELLING 30 tablet 2    pravastatin (PRAVACHOL) 40 MG tablet TAKE 1 TABLET BY MOUTH EVERY OTHER DAY 45 tablet 11    brinzolamide-brimonidine (SIMBRINZA) 1-0.2 % SUSP 1 gtt ou bid 1 Bottle 0    naproxen (NAPROSYN) 500 MG tablet Take 1 tablet by mouth 2 times daily (with meals) As needed for heel pain. 60 tablet 11    ipratropium-albuterol (DUONEB) 0.5-2.5 (3) MG/3ML SOLN nebulizer solution Take 3 mLs by nebulization every 6 hours as needed for Shortness of Breath. 360 mL 1    latanoprost (XALATAN) 0.005 % ophthalmic solution Place 1 drop into both eyes nightly. No current facility-administered medications for this visit. Review of Systems:   All others are negative, except as noted in the HPI.     Patient History:  Past Medical History:   Diagnosis Date    Afib ([facility] [address]     Anxiety     Atrial fibrillation (HCC)     Benign essential hypertension     Cervical back pain with evidence of disc disease     Cramps, extremity     Elevated prostate specific antigen (PSA)     history of    Emphysema     Hypercholesteremia     Kidney stones         Past Surgical History:   Procedure Laterality Date    APPENDECTOMY      CARPAL TUNNEL RELEASE Bilateral     COCHLEAR IMPLANT  2017    bilat -service related - done at [address] [facility] [place]      cataract with lens implant    HERNIA REPAIR      rt side    KNEE ARTHROSCOPY Bilateral     THROAT SURGERY      uvula resected, and nasal surgery for sleep apnea    TURP  2007    VASECTOMY          Social History     Socioeconomic History    Marital status:      Spouse name: Not on file    Number of children: Not on file    Years of education: Not on file    Highest education level: Not on file   Occupational History    Not on file   Social Needs    Financial resource strain: Not on file    Food insecurity:     Worry: Not on file     Inability: Not on file    Transportation needs:     Medical: Not on file     Non-medical: Not on file   Tobacco Use    Smoking status: Never Smoker    Smokeless tobacco: Never Used   Substance and Sexual Activity    Alcohol use: No     Alcohol/week: 0.0 standard drinks    Drug use: No    Sexual activity: Never     Partners: Female   Lifestyle    Physical activity:     Days per week: Not on file     Minutes per session: Not on file    Stress: Not on file   Relationships    Social connections:     Talks on phone: Not on file     Gets together: Not on file     Attends [employer] service: Not on file     Active member of club or organization: Not on file     Attends meetings of clubs or organizations: Not on file     Relationship status: Not on file    Intimate partner violence:     Fear of current or ex partner: Not on file     Emotionally abused: Not on file     Physically abused: Not on file     Forced sexual activity: Not on file   Other Topics Concern    Not on file   Social History Narrative    Not on file        Family History   Problem Relation Age of Onset    Stroke Father 68        Physical Exam:  Physical Exam   Constitutional: He is oriented to person, place, and time. He appears well-developed and well-nourished. No distress. HENT:   Head: Normocephalic and atraumatic.    Mouth/Throat: Oropharynx is clear and moist. No oropharyngeal exudate. Several excoriations. No flaking or other inflammation. Eyes: Conjunctivae are normal. No scleral icterus. Cardiovascular: Normal rate, regular rhythm, normal heart sounds and intact distal pulses. No murmur heard. Pulmonary/Chest: Effort normal and breath sounds normal. No respiratory distress. He has no wheezes. He has no rhonchi. He has no rales. Abdominal: Soft. He exhibits no distension. There is no tenderness. Musculoskeletal: He exhibits no edema. Good pulses. Neurological: He is alert and oriented to person, place, and time. Skin: Skin is warm and dry. Psychiatric: He has a normal mood and affect. Nursing note and vitals reviewed. Laboratory Studies:  Lab Results   Component Value Date    WBC 6.3 04/25/2016    HGB 13.4 (L) 04/25/2016    HCT 41.0 04/25/2016    MCV 92.9 04/25/2016     04/25/2016        Lab Results   Component Value Date     04/25/2019    K 4.4 04/25/2019     04/25/2019    CO2 29 04/25/2019    BUN 19 04/25/2019    CREATININE 0.9 04/25/2019    GLUCOSE 102 (H) 04/25/2019    CALCIUM 9.1 04/25/2019    PROT 7.3 04/25/2019    LABALBU 4.1 04/25/2019    BILITOT 0.5 04/25/2019    ALKPHOS 54 04/25/2019    AST 19 04/25/2019    ALT 18 04/25/2019    LABGLOM >60 04/25/2019    GFRAA >60 04/25/2019    AGRATIO 1.3 04/25/2019    GLOB 3.2 04/25/2019       Lab Results   Component Value Date    CHOL 166 04/25/2016    TRIG 136 04/25/2016    HDL 43 04/25/2016    LDLCALC 96 04/25/2016    LABVLDL 27 04/25/2016       Lab Results   Component Value Date    TSH 2.64 01/09/2014        No laboratory studies for Vitamin D.      Immunizations:  Immunization History   Administered Date(s) Administered    Influenza 10/10/2012, 09/17/2013    Influenza Whole 10/04/2011    Pneumococcal Conjugate 13-valent (Dulce Adelfo) 03/23/2015       Health Maintenance Review:  Health Maintenance Due   Topic Date Due    DTaP/Tdap/Td vaccine (1 - Tdap) 05/14/1949    Shingles Vaccine (1 of 2) 05/14/1980    Annual Wellness Visit (AWV)  05/14/1993    Pneumococcal 65+ years Vaccine (2 of 2 - PPSV23) 03/23/2016         PSA:  Lab Results   Component Value Date    PSA 8.42 (H) 04/25/2016    PSA 7.24 (H) 04/15/2015    PSA 5.89 (H) 01/31/2011       Recent Imaging Results:  No results found. Assessment:    Diagnosis Orders   1. Essential hypertension     2. Localized swelling of both lower legs     3. Hand arthritis  traMADol (ULTRAM) 50 MG tablet   4. COPD, mild (HCC)     5. Elevated prostate specific antigen (PSA)     6. 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 Scalp itch     8. Hyperglycemia            Plan:  I have reviewed patient's current anti-hypertensive medications. Patient's clinical presentation and last three blood pressure readings do not suggest the need for any changes to current blood pressure medications or dosages at this time. Refills have been ordered per patient's request, see below for more detials. Discussed with the patient the importance of adhering to their current medication regiment as directed. Informed the patient to monitor salt intake and to maintain a healthy balanced diet. Recommended that patient continue to monitor their blood pressure daily. Patient has been made aware of available resources for checking their blood pressure if they are unable to at home. Patient was advised to return for reevaluation in 3 months. Consider blood work at a later date. Discussed continuing the Gold Bond as needed. Prescription for Nizoral given to help with itchy scalp. Patient instructed on how to use the shampoo and to monitor for relief over the next 2 to 3 weeks. [place] review, no suspicion for controlled medication abuse. Prescription for Tramadol 50mg given today. Patient to continue taking as needed for chronic back pain.  Continue using brace for back as needed for

## 2019-09-18 ENCOUNTER — TELEPHONE (OUTPATIENT)
Dept: FAMILY MEDICINE CLINIC | Age: 84
End: 2019-09-18

## 2019-10-17 RX ORDER — POTASSIUM CHLORIDE 750 MG/1
TABLET, EXTENDED RELEASE ORAL
Qty: 30 TABLET | Refills: 1 | Status: SHIPPED | OUTPATIENT
Start: 2019-10-17 | End: 2019-12-18

## 2019-10-31 ENCOUNTER — NURSE ONLY (OUTPATIENT)
Dept: FAMILY MEDICINE CLINIC | Age: 84
End: 2019-10-31
Payer: MEDICARE

## 2019-10-31 DIAGNOSIS — Z23 NEED FOR IMMUNIZATION AGAINST INFLUENZA: Primary | ICD-10-CM

## 2019-10-31 PROCEDURE — 90653 IIV ADJUVANT VACCINE IM: CPT | Performed by: FAMILY MEDICINE

## 2019-10-31 PROCEDURE — G0008 ADMIN INFLUENZA VIRUS VAC: HCPCS | Performed by: FAMILY MEDICINE

## 2019-11-13 ENCOUNTER — OFFICE VISIT (OUTPATIENT)
Dept: ENT CLINIC | Age: 84
End: 2019-11-13
Payer: MEDICARE

## 2019-11-13 VITALS — OXYGEN SATURATION: 93 % | DIASTOLIC BLOOD PRESSURE: 72 MMHG | SYSTOLIC BLOOD PRESSURE: 124 MMHG | HEART RATE: 88 BPM

## 2019-11-13 DIAGNOSIS — H60.8X1 CHRONIC ECZEMATOUS OTITIS EXTERNA OF RIGHT EAR: Primary | ICD-10-CM

## 2019-11-13 PROCEDURE — 4040F PNEUMOC VAC/ADMIN/RCVD: CPT | Performed by: OTOLARYNGOLOGY

## 2019-11-13 PROCEDURE — G8427 DOCREV CUR MEDS BY ELIG CLIN: HCPCS | Performed by: OTOLARYNGOLOGY

## 2019-11-13 PROCEDURE — G8417 CALC BMI ABV UP PARAM F/U: HCPCS | Performed by: OTOLARYNGOLOGY

## 2019-11-13 PROCEDURE — 1036F TOBACCO NON-USER: CPT | Performed by: OTOLARYNGOLOGY

## 2019-11-13 PROCEDURE — 99213 OFFICE O/P EST LOW 20 MIN: CPT | Performed by: OTOLARYNGOLOGY

## 2019-11-13 PROCEDURE — G8482 FLU IMMUNIZE ORDER/ADMIN: HCPCS | Performed by: OTOLARYNGOLOGY

## 2019-11-13 PROCEDURE — 1123F ACP DISCUSS/DSCN MKR DOCD: CPT | Performed by: OTOLARYNGOLOGY

## 2019-11-13 RX ORDER — TRIAMCINOLONE ACETONIDE 1 MG/G
CREAM TOPICAL
Qty: 15 G | Refills: 1 | Status: SHIPPED | OUTPATIENT
Start: 2019-11-13 | End: 2020-03-03 | Stop reason: ALTCHOICE

## 2019-12-03 ENCOUNTER — OFFICE VISIT (OUTPATIENT)
Dept: FAMILY MEDICINE CLINIC | Age: 84
End: 2019-12-03
Payer: MEDICARE

## 2019-12-03 VITALS
OXYGEN SATURATION: 98 % | RESPIRATION RATE: 18 BRPM | WEIGHT: 218 LBS | SYSTOLIC BLOOD PRESSURE: 142 MMHG | HEIGHT: 64 IN | HEART RATE: 99 BPM | BODY MASS INDEX: 37.22 KG/M2 | DIASTOLIC BLOOD PRESSURE: 80 MMHG

## 2019-12-03 DIAGNOSIS — G47.00 INSOMNIA, UNSPECIFIED TYPE: ICD-10-CM

## 2019-12-03 DIAGNOSIS — R41.3 MEMORY CHANGE: Primary | ICD-10-CM

## 2019-12-03 DIAGNOSIS — R10.9 RIGHT SIDED ABDOMINAL PAIN: ICD-10-CM

## 2019-12-03 DIAGNOSIS — I10 ESSENTIAL HYPERTENSION: ICD-10-CM

## 2019-12-03 DIAGNOSIS — H91.93 BILATERAL HEARING LOSS, UNSPECIFIED HEARING LOSS TYPE: ICD-10-CM

## 2019-12-03 PROCEDURE — G8482 FLU IMMUNIZE ORDER/ADMIN: HCPCS | Performed by: FAMILY MEDICINE

## 2019-12-03 PROCEDURE — G8417 CALC BMI ABV UP PARAM F/U: HCPCS | Performed by: FAMILY MEDICINE

## 2019-12-03 PROCEDURE — 4040F PNEUMOC VAC/ADMIN/RCVD: CPT | Performed by: FAMILY MEDICINE

## 2019-12-03 PROCEDURE — G8427 DOCREV CUR MEDS BY ELIG CLIN: HCPCS | Performed by: FAMILY MEDICINE

## 2019-12-03 PROCEDURE — 1036F TOBACCO NON-USER: CPT | Performed by: FAMILY MEDICINE

## 2019-12-03 PROCEDURE — 1123F ACP DISCUSS/DSCN MKR DOCD: CPT | Performed by: FAMILY MEDICINE

## 2019-12-03 PROCEDURE — 99214 OFFICE O/P EST MOD 30 MIN: CPT | Performed by: FAMILY MEDICINE

## 2019-12-03 RX ORDER — TRAMADOL HYDROCHLORIDE 50 MG/1
50 TABLET ORAL EVERY 6 HOURS PRN
COMMUNITY
End: 2020-04-14

## 2020-02-11 RX ORDER — POTASSIUM CHLORIDE 750 MG/1
TABLET, EXTENDED RELEASE ORAL
Qty: 90 TABLET | Refills: 0 | Status: SHIPPED | OUTPATIENT
Start: 2020-02-11 | End: 2020-05-13

## 2020-02-11 RX ORDER — POTASSIUM CHLORIDE 750 MG/1
TABLET, EXTENDED RELEASE ORAL
Qty: 30 TABLET | Refills: 2 | Status: SHIPPED | OUTPATIENT
Start: 2020-02-11 | End: 2020-02-11

## 2020-02-26 RX ORDER — FUROSEMIDE 20 MG/1
TABLET ORAL
Qty: 30 TABLET | Refills: 5 | Status: SHIPPED | OUTPATIENT
Start: 2020-02-26 | End: 2020-08-19 | Stop reason: SDUPTHER

## 2020-03-03 ENCOUNTER — OFFICE VISIT (OUTPATIENT)
Dept: FAMILY MEDICINE CLINIC | Age: 85
End: 2020-03-03
Payer: MEDICARE

## 2020-03-03 VITALS
SYSTOLIC BLOOD PRESSURE: 138 MMHG | DIASTOLIC BLOOD PRESSURE: 84 MMHG | BODY MASS INDEX: 36.7 KG/M2 | HEIGHT: 64 IN | WEIGHT: 215 LBS | RESPIRATION RATE: 14 BRPM | HEART RATE: 98 BPM | OXYGEN SATURATION: 99 %

## 2020-03-03 LAB
A/G RATIO: 1.7 (ref 1.1–2.2)
ALBUMIN SERPL-MCNC: 4.3 G/DL (ref 3.4–5)
ALP BLD-CCNC: 55 U/L (ref 40–129)
ALT SERPL-CCNC: 19 U/L (ref 10–40)
ANION GAP SERPL CALCULATED.3IONS-SCNC: 15 MMOL/L (ref 3–16)
AST SERPL-CCNC: 16 U/L (ref 15–37)
BILIRUB SERPL-MCNC: 0.6 MG/DL (ref 0–1)
BUN BLDV-MCNC: 24 MG/DL (ref 7–20)
CALCIUM SERPL-MCNC: 10.1 MG/DL (ref 8.3–10.6)
CHLORIDE BLD-SCNC: 102 MMOL/L (ref 99–110)
CHOLESTEROL, TOTAL: 173 MG/DL (ref 0–199)
CO2: 28 MMOL/L (ref 21–32)
CREAT SERPL-MCNC: 1 MG/DL (ref 0.8–1.3)
GFR AFRICAN AMERICAN: >60
GFR NON-AFRICAN AMERICAN: >60
GLOBULIN: 2.6 G/DL
GLUCOSE BLD-MCNC: 109 MG/DL (ref 70–99)
HDLC SERPL-MCNC: 45 MG/DL (ref 40–60)
LDL CHOLESTEROL CALCULATED: 90 MG/DL
POTASSIUM SERPL-SCNC: 4.7 MMOL/L (ref 3.5–5.1)
SODIUM BLD-SCNC: 145 MMOL/L (ref 136–145)
TOTAL PROTEIN: 6.9 G/DL (ref 6.4–8.2)
TRIGL SERPL-MCNC: 190 MG/DL (ref 0–150)
VLDLC SERPL CALC-MCNC: 38 MG/DL

## 2020-03-03 PROCEDURE — G8926 SPIRO NO PERF OR DOC: HCPCS | Performed by: FAMILY MEDICINE

## 2020-03-03 PROCEDURE — 1036F TOBACCO NON-USER: CPT | Performed by: FAMILY MEDICINE

## 2020-03-03 PROCEDURE — G8417 CALC BMI ABV UP PARAM F/U: HCPCS | Performed by: FAMILY MEDICINE

## 2020-03-03 PROCEDURE — 99214 OFFICE O/P EST MOD 30 MIN: CPT | Performed by: FAMILY MEDICINE

## 2020-03-03 PROCEDURE — G8427 DOCREV CUR MEDS BY ELIG CLIN: HCPCS | Performed by: FAMILY MEDICINE

## 2020-03-03 PROCEDURE — 1123F ACP DISCUSS/DSCN MKR DOCD: CPT | Performed by: FAMILY MEDICINE

## 2020-03-03 PROCEDURE — 36415 COLL VENOUS BLD VENIPUNCTURE: CPT | Performed by: FAMILY MEDICINE

## 2020-03-03 PROCEDURE — 4040F PNEUMOC VAC/ADMIN/RCVD: CPT | Performed by: FAMILY MEDICINE

## 2020-03-03 PROCEDURE — G8482 FLU IMMUNIZE ORDER/ADMIN: HCPCS | Performed by: FAMILY MEDICINE

## 2020-03-03 PROCEDURE — 3023F SPIROM DOC REV: CPT | Performed by: FAMILY MEDICINE

## 2020-03-03 RX ORDER — DORZOLAMIDE HCL 20 MG/ML
SOLUTION/ DROPS OPHTHALMIC
COMMUNITY
Start: 2020-02-04 | End: 2020-08-19 | Stop reason: ALTCHOICE

## 2020-03-03 ASSESSMENT — PATIENT HEALTH QUESTIONNAIRE - PHQ9
1. LITTLE INTEREST OR PLEASURE IN DOING THINGS: 0
SUM OF ALL RESPONSES TO PHQ9 QUESTIONS 1 & 2: 0
2. FEELING DOWN, DEPRESSED OR HOPELESS: 0
SUM OF ALL RESPONSES TO PHQ QUESTIONS 1-9: 0
SUM OF ALL RESPONSES TO PHQ QUESTIONS 1-9: 0

## 2020-03-03 NOTE — PROGRESS NOTES
Subjective:      Patient ID: Lacie Osgood is a 80 y.o. male. HPI  Chief Complaint   Patient presents with    Hypertension     HTN ROUTINE FOLLOW UP      BP Readings from Last 3 Encounters:   03/03/20 138/84   12/03/19 (!) 142/80   11/13/19 124/72     Pulse Readings from Last 3 Encounters:   03/03/20 98   12/03/19 99   11/13/19 88     Wt Readings from Last 3 Encounters:   03/03/20 215 lb (97.5 kg)   12/03/19 218 lb (98.9 kg)   08/27/19 216 lb (98 kg)     bp usually runs 130-140  Gets ha >140 or dizzy <110.  143 earlier today - had slight headache. Drinks decaf coffee  Takes furosemide 0900 - goes every 30min til 1 am - goes 3-4 hours at night  No swelling in feet/ ankles - but swelling left hand bereket when gets up in am - ? Sleeps on hand -has oa  All kinds of pains come and go  Takes tramadol prn - which helps pain  Changed eye drops per eye doctor recently for iop  occ right sided chest pain - comes and goes - not recently -   Pain in right hip for 2-3 weeks bereket bad in am but seems better now  Not much physical activity  Leans on cart to walk through grocery - no muscle pain other than occ muscle cramp - gets itching over C7 area - periodically- using gold bond cream.  Eating late - after 7 pm - wakes up 0200 to use nebulizer - may use nebulizer up to 3x/ week - hard to fill lungs up  Aid right ear - cochlear implant on left side - hard to understand people  Review of Systems    Objective:   Physical Exam  Constitutional:       General: He is not in acute distress. Appearance: He is well-developed. Eyes:      General: No scleral icterus. Conjunctiva/sclera: Conjunctivae normal.   Cardiovascular:      Rate and Rhythm: Normal rate and regular rhythm. Heart sounds: Normal heart sounds. No murmur. No gallop. Pulmonary:      Effort: Pulmonary effort is normal. No respiratory distress. Breath sounds: Normal breath sounds. No wheezing, rhonchi or rales.    Abdominal:      General: Bowel sounds are normal. There is no distension. Palpations: Abdomen is soft. Tenderness: There is no abdominal tenderness. Skin:     Findings: No erythema or rash. Neurological:      Mental Status: He is alert. Assessment:       Diagnosis Orders   1. Essential hypertension     2. Localized edema     3. Arthritis     4. Eczema, unspecified type     5. COPD, mild ([address]     6. Mild anemia     7. Bilateral hearing loss, unspecified hearing loss type             Plan:      F/u audiologist regarding hearing issues/ cochlear implant  Check labs  F/u optho for gtts for iop -vision grossly intact  pravachol for lipids  Cont lasix/ kcl/ micardis hct for bp - working well  Ultram prn oa pain -works well    Refill meds as needed  Fasting labs soon.   Albuterol prn    Vidhi Ureña MD

## 2020-03-16 RX ORDER — TELMISARTAN AND HYDROCHLORTHIAZIDE 80; 25 MG/1; MG/1
1 TABLET ORAL DAILY
Qty: 90 TABLET | Refills: 1 | Status: SHIPPED | OUTPATIENT
Start: 2020-03-16 | End: 2020-08-11

## 2020-05-13 RX ORDER — POTASSIUM CHLORIDE 750 MG/1
TABLET, EXTENDED RELEASE ORAL
Qty: 90 TABLET | Refills: 0 | Status: SHIPPED | OUTPATIENT
Start: 2020-05-13 | End: 2020-08-11

## 2020-07-06 RX ORDER — PRAVASTATIN SODIUM 40 MG
TABLET ORAL
Qty: 45 TABLET | Refills: 3 | Status: SHIPPED | OUTPATIENT
Start: 2020-07-06 | End: 2021-07-06

## 2020-08-11 RX ORDER — TELMISARTAN AND HYDROCHLORTHIAZIDE 80; 25 MG/1; MG/1
1 TABLET ORAL DAILY
Qty: 90 TABLET | Refills: 1 | Status: SHIPPED | OUTPATIENT
Start: 2020-08-11 | End: 2021-03-10

## 2020-08-11 RX ORDER — POTASSIUM CHLORIDE 750 MG/1
TABLET, EXTENDED RELEASE ORAL
Qty: 90 TABLET | Refills: 0 | Status: SHIPPED | OUTPATIENT
Start: 2020-08-11 | End: 2020-11-09

## 2020-08-19 ENCOUNTER — VIRTUAL VISIT (OUTPATIENT)
Dept: FAMILY MEDICINE CLINIC | Age: 85
End: 2020-08-19
Payer: MEDICARE

## 2020-08-19 PROBLEM — E66.01 MORBIDLY OBESE (HCC): Status: ACTIVE | Noted: 2020-08-19

## 2020-08-19 PROCEDURE — 4040F PNEUMOC VAC/ADMIN/RCVD: CPT | Performed by: NURSE PRACTITIONER

## 2020-08-19 PROCEDURE — G8427 DOCREV CUR MEDS BY ELIG CLIN: HCPCS | Performed by: NURSE PRACTITIONER

## 2020-08-19 PROCEDURE — 99442 PR PHYS/QHP TELEPHONE EVALUATION 11-20 MIN: CPT | Performed by: NURSE PRACTITIONER

## 2020-08-19 PROCEDURE — 1123F ACP DISCUSS/DSCN MKR DOCD: CPT | Performed by: NURSE PRACTITIONER

## 2020-08-19 RX ORDER — FUROSEMIDE 20 MG/1
TABLET ORAL
Qty: 90 TABLET | Refills: 1 | Status: SHIPPED | OUTPATIENT
Start: 2020-08-19 | End: 2021-03-01

## 2020-08-19 ASSESSMENT — ENCOUNTER SYMPTOMS
EYES NEGATIVE: 1
RESPIRATORY NEGATIVE: 1
SHORTNESS OF BREATH: 0
GASTROINTESTINAL NEGATIVE: 1
ALLERGIC/IMMUNOLOGIC NEGATIVE: 1

## 2020-08-19 NOTE — PROGRESS NOTES
2020     Suraj Hewitt (:  1930) is a 80 y.o. male, here for evaluation of the following medical concerns:  Teresa Domingo is a 80 y.o. male evaluated via telephone on 2020. Consent:  He and/or health care decision maker is aware that that he may receive a bill for this telephone service, depending on his insurance coverage, and has provided verbal consent to proceed: Yes      Documentation:  I communicated with the patient and/or health care decision maker about HTN- EDEMA. Details of this discussion including any medical advice provided:       I affirm this is a Patient Initiated Episode with a Patient who has not had a related appointment within my department in the past 7 days or scheduled within the next 24 hours. Patient identification was verified at the start of the visit: Yes    Total Time: minutes: 11-20 minutes    Note: not billable if this call serves to triage the patient into an appointment for the relevant concern      Jaren CASTRO   Chief Complaint   Patient presents with    Hypertension     HTN ROUTINE FOLLOW UP   ROUTINE FOLLOW UP   HE DOES CHECK HIS B/P TID WITH A WRIST CUFF  Treatment Adherence:   Medication compliance:  compliant all of the time  Diet compliance:  compliant all of the time  Weight trend: stable  Current exercise: no regular exercise  Barriers: none    Hypertension:  Home blood pressure monitoring: Yes - 2 -3 X DAILY AND IT IS PRETTY GOOD THE ONLY TIME ABOVE 140 - 9 . Patient denies chest pain, shortness of breath, headache, lightheadedness, blurred vision, peripheral edema, palpitations, dry cough and fatigue. Antihypertensive medication side effects: no medication side effects noted. Use of agents associated with hypertension: none.                                         Sodium (mmol/L)   Date Value   2020 145    BUN (mg/dL)   Date Value   2020 24 (H)    Glucose (mg/dL)   Date Value   2020 109 (H) Potassium (mmol/L)   Date Value   03/03/2020 4.7    CREATININE (mg/dL)   Date Value   03/03/2020 1.0         Hyperlipidemia:  No new myalgias or GI upset on pravastatin (Pravachol). EVERY OTHER DAY    Lab Results   Component Value Date    CHOL 173 03/03/2020    TRIG 190 (H) 03/03/2020    HDL 45 03/03/2020    LDLCALC 90 03/03/2020     Lab Results   Component Value Date    ALT 19 03/03/2020    AST 16 03/03/2020          Review of Systems   Constitutional: Negative. Negative for unexpected weight change. HENT: Negative. Eyes: Negative. Respiratory: Negative. Negative for shortness of breath. Cardiovascular: Positive for leg swelling. Negative for chest pain and palpitations. ANKLE EDEMA    Gastrointestinal: Negative. Endocrine: Negative. Genitourinary: Negative. Musculoskeletal: Negative. Skin: Negative. Allergic/Immunologic: Negative. Neurological: Negative. Hematological: Negative. Psychiatric/Behavioral: Negative. All other systems reviewed and are negative. Prior to Visit Medications    Medication Sig Taking? Authorizing Provider   telmisartan-hydrochlorothiazide (MICARDIS HCT) 80-25 MG per tablet TAKE 1 TABLET BY MOUTH DAILY Yes SHANDA Mistry CNP   potassium chloride (KLOR-CON M) 10 MEQ extended release tablet TAKE 1 TABLET BY MOUTH DAILY WITH FUROSEMIDE AS NEEDED FOR SWELLING Yes SHANDA Reddy CNP   pravastatin (PRAVACHOL) 40 MG tablet TAKE 1 TABLET BY MOUTH EVERY OTHER DAY Yes Kelvin Aponte MD   furosemide (LASIX) 20 MG tablet TAKE 1 TABLET BY MOUTH DAILY FOR SWELLING Yes Kelvin Aponte MD   ipratropium-albuterol (DUONEB) 0.5-2.5 (3) MG/3ML SOLN nebulizer solution Take 3 mLs by nebulization every 6 hours as needed for Shortness of Breath. Yes Radha Hawk MD   latanoprost (XALATAN) 0.005 % ophthalmic solution Place 1 drop into both eyes nightly.  Yes Historical Provider, MD        Social History     Tobacco Use    Smoking status: Never Smoker    Smokeless tobacco: Never Used   Substance Use Topics    Alcohol use: No     Alcohol/week: 0.0 standard drinks        There were no vitals filed for this visit. Estimated body mass index is 36.9 kg/m² as calculated from the following:    Height as of 3/3/20: 5' 4\" (1.626 m). Weight as of 3/3/20: 215 lb (97.5 kg). Physical Exam  Psychiatric:         Attention and Perception: Attention and perception normal.         Mood and Affect: Mood normal.         Speech: Speech normal.         Behavior: Behavior normal.         Thought Content: Thought content normal.         Cognition and Memory: Cognition and memory normal.         Judgment: Judgment normal.         Robin was seen today for hypertension.     Diagnoses and all orders for this visit:    Essential hypertension  CONTINUE MICARDIS AS PRESCRIBED  NO REFILLS NEEDED AT THIS TIME  CONTINUE MONITORING B/P   Lifestyle Recommendations for High Blood Pressure:  Reduce sodium intake to less than 1500 mg daily  Include 5-7 servings of fruits and vegetables daily  FOLLOW UP IN SIX MONTHS  Localized edema  -     furosemide (LASIX) 20 MG tablet; TAKE 1 TABLET BY MOUTH DAILY FOR SWELLING AS NEEDED ONLY  ENCOURAGED TO ELEVATE FEET AS MUCH AS POSSIBLE  Hypercholesteremia  CONTINUE PRAVASTATIN AS PRESCRIBED

## 2020-09-14 ENCOUNTER — TELEPHONE (OUTPATIENT)
Dept: ADMINISTRATIVE | Age: 85
End: 2020-09-14

## 2020-09-14 NOTE — TELEPHONE ENCOUNTER
Submitted PA for traMADol HCl 50MG tablets      Via CMKey: [id number]     STATUS: no pa need per plan    Letter attached. .Please notify patient, thank you.

## 2020-10-07 ENCOUNTER — VIRTUAL VISIT (OUTPATIENT)
Dept: FAMILY MEDICINE CLINIC | Age: 85
End: 2020-10-07
Payer: MEDICARE

## 2020-10-07 PROBLEM — E66.01 MORBIDLY OBESE (HCC): Status: RESOLVED | Noted: 2020-08-19 | Resolved: 2020-10-07

## 2020-10-07 PROCEDURE — 99442 PR PHYS/QHP TELEPHONE EVALUATION 11-20 MIN: CPT | Performed by: FAMILY MEDICINE

## 2020-10-07 RX ORDER — TRAMADOL HYDROCHLORIDE 50 MG/1
50 TABLET ORAL EVERY 6 HOURS PRN
Qty: 90 TABLET | Refills: 2 | Status: SHIPPED | OUTPATIENT
Start: 2020-10-07 | End: 2020-11-06

## 2020-10-07 NOTE — PROGRESS NOTES
Yousif Valladares is a 80 y.o. male evaluated via telephone on 10/7/2020. Chief Complaint   Patient presents with    Arthritis     ARTHRITIS/CHRONIC PAIN ROUTINE FOLLOW UP, TRAMADOL REFILL. NO PREVIOUS UDS OR MED CONTRACT IN CHART,  DUE FOR OARRS TODAY. Needs tramadol  Feels pretty good overall  Not as active generally - mows grass still. Uses tylenol pm  Sleeps well. Went to eye doctor - glaucoma is stable  Vision poor but stable. Not eating as much - diet okay. Likes trout - eats 1x/ week  Weight stable - 211#. O/w okay     Consent:  He and/or health care decision maker is aware that that he may receive a bill for this telephone service, depending on his insurance coverage, and has provided verbal consent to proceed: Yes      Documentation:  I communicated with the patient and/or health care decision maker about see above. Details of this discussion including any medical advice provided: see above  Dx - arthritis / chronic pain   Need for flu shot - scheduled  Tramadol refill - tolerates / works well  Sam Spangler reviewed and results c/w rx'd meds  #90 tramadol filled 9/14 - uses 90 every few months - ? More in winter    I affirm this is a Patient Initiated Episode with a Patient who has not had a related appointment within my department in the past 7 days or scheduled within the next 24 hours.     Patient identification was verified at the start of the visit: Yes    Total Time: minutes: 11-20 minutes    Note: not billable if this call serves to triage the patient into an appointment for the relevant concern      Li Cohen

## 2020-10-20 ENCOUNTER — IMMUNIZATION (OUTPATIENT)
Dept: FAMILY MEDICINE CLINIC | Age: 85
End: 2020-10-20
Payer: MEDICARE

## 2020-10-20 PROCEDURE — G0008 ADMIN INFLUENZA VIRUS VAC: HCPCS | Performed by: FAMILY MEDICINE

## 2020-10-20 PROCEDURE — 90653 IIV ADJUVANT VACCINE IM: CPT | Performed by: FAMILY MEDICINE

## 2020-10-20 NOTE — PROGRESS NOTES
Vaccine Information Sheet, \"Influenza - Inactivated\"  given to [employer], or parent/legal guardian of  [employer] and verbalized understanding. Patient responses:    Have you ever had a reaction to a flu vaccine? No  Do you have any current illness? No  Have you ever had [place] Syndrome? No  Do you have a serious allergy to any of the follow: Neomycin, Polymyxin, Thimerosal, eggs or egg products? No    Flu vaccine given per order. Please see immunization tab. Risks and benefits explained. Current VIS given.       Immunizations Administered     Name Date Dose Route    Influenza, Triv, inactivated, subunit, adjuvanted, IM (Fluad 65 yrs and older) 10/20/2020 0.5 mL Intramuscular    Site: Deltoid- Left    Lot: [id number]    [address]: 37926-198-85

## 2020-11-09 RX ORDER — POTASSIUM CHLORIDE 750 MG/1
TABLET, EXTENDED RELEASE ORAL
Qty: 90 TABLET | Refills: 1 | Status: SHIPPED | OUTPATIENT
Start: 2020-11-09 | End: 2021-06-01

## 2021-01-04 ENCOUNTER — TELEPHONE (OUTPATIENT)
Dept: FAMILY MEDICINE CLINIC | Age: 86
End: 2021-01-04

## 2021-01-04 DIAGNOSIS — E78.00 HYPERCHOLESTEREMIA: ICD-10-CM

## 2021-01-04 DIAGNOSIS — I10 ESSENTIAL HYPERTENSION: Primary | ICD-10-CM

## 2021-01-06 ENCOUNTER — HOSPITAL ENCOUNTER (OUTPATIENT)
Age: 86
Discharge: HOME | End: 2021-01-06
Payer: MEDICARE

## 2021-01-06 DIAGNOSIS — I10 ESSENTIAL HYPERTENSION: ICD-10-CM

## 2021-01-06 DIAGNOSIS — E78.00 HYPERCHOLESTEREMIA: ICD-10-CM

## 2021-01-06 LAB
A/G RATIO: 1.3 (ref 1.1–2.2)
ALBUMIN SERPL-MCNC: 4.1 G/DL (ref 3.4–5)
ALP BLD-CCNC: 61 U/L (ref 40–129)
ALT SERPL-CCNC: 14 U/L (ref 10–40)
ANION GAP SERPL CALCULATED.3IONS-SCNC: 10 MMOL/L (ref 3–16)
AST SERPL-CCNC: 16 U/L (ref 15–37)
BILIRUB SERPL-MCNC: 0.8 MG/DL (ref 0–1)
BUN BLDV-MCNC: 33 MG/DL (ref 7–20)
CALCIUM SERPL-MCNC: 9.9 MG/DL (ref 8.3–10.6)
CHLORIDE BLD-SCNC: 97 MMOL/L (ref 99–110)
CHOLESTEROL, TOTAL: 150 MG/DL (ref 0–199)
CO2: 31 MMOL/L (ref 21–32)
CREAT SERPL-MCNC: 1 MG/DL (ref 0.8–1.3)
GFR AFRICAN AMERICAN: >60
GFR NON-AFRICAN AMERICAN: >60
GLOBULIN: 3.1 G/DL
GLUCOSE BLD-MCNC: 102 MG/DL (ref 70–99)
HDLC SERPL-MCNC: 45 MG/DL (ref 40–60)
LDL CHOLESTEROL CALCULATED: 85 MG/DL
POTASSIUM SERPL-SCNC: 4.5 MMOL/L (ref 3.5–5.1)
SODIUM BLD-SCNC: 138 MMOL/L (ref 136–145)
TOTAL PROTEIN: 7.2 G/DL (ref 6.4–8.2)
TRIGL SERPL-MCNC: 98 MG/DL (ref 0–150)
VLDLC SERPL CALC-MCNC: 20 MG/DL

## 2021-01-06 PROCEDURE — 36415 COLL VENOUS BLD VENIPUNCTURE: CPT

## 2021-01-06 PROCEDURE — 80053 COMPREHEN METABOLIC PANEL: CPT

## 2021-01-06 PROCEDURE — 80061 LIPID PANEL: CPT

## 2021-01-22 ENCOUNTER — TELEPHONE (OUTPATIENT)
Dept: FAMILY MEDICINE CLINIC | Age: 86
End: 2021-01-22

## 2021-03-01 RX ORDER — FUROSEMIDE 20 MG/1
TABLET ORAL
Qty: 90 TABLET | Refills: 0 | Status: SHIPPED | OUTPATIENT
Start: 2021-03-01 | End: 2021-06-01 | Stop reason: SDUPTHER

## 2021-03-10 RX ORDER — TELMISARTAN AND HYDROCHLORTHIAZIDE 80; 25 MG/1; MG/1
1 TABLET ORAL DAILY
Qty: 90 TABLET | Refills: 0 | Status: SHIPPED | OUTPATIENT
Start: 2021-03-10 | End: 2021-06-01 | Stop reason: SDUPTHER

## 2021-04-10 ENCOUNTER — APPOINTMENT (OUTPATIENT)
Dept: GENERAL RADIOLOGY | Age: 86
DRG: 310 | End: 2021-04-10
Payer: MEDICARE

## 2021-04-10 ENCOUNTER — HOSPITAL ENCOUNTER (INPATIENT)
Age: 86
LOS: 1 days | Discharge: HOME | DRG: 310 | End: 2021-04-12
Attending: INTERNAL MEDICINE | Admitting: INTERNAL MEDICINE
Payer: MEDICARE

## 2021-04-10 ENCOUNTER — APPOINTMENT (OUTPATIENT)
Dept: CT IMAGING | Age: 86
DRG: 310 | End: 2021-04-10
Payer: MEDICARE

## 2021-04-10 DIAGNOSIS — R94.31 ABNORMAL EKG: ICD-10-CM

## 2021-04-10 DIAGNOSIS — R07.9 ACUTE CHEST PAIN: Primary | ICD-10-CM

## 2021-04-10 DIAGNOSIS — R51.9 ACUTE NONINTRACTABLE HEADACHE, UNSPECIFIED HEADACHE TYPE: ICD-10-CM

## 2021-04-10 LAB
A/G RATIO: 1.4 (ref 1.1–2.2)
ALBUMIN SERPL-MCNC: 4.3 G/DL (ref 3.4–5)
ALP BLD-CCNC: 54 U/L (ref 40–129)
ALT SERPL-CCNC: 15 U/L (ref 10–40)
ANION GAP SERPL CALCULATED.3IONS-SCNC: 10 MMOL/L (ref 3–16)
AST SERPL-CCNC: 17 U/L (ref 15–37)
BASOPHILS ABSOLUTE: 0 K/UL (ref 0–0.2)
BASOPHILS RELATIVE PERCENT: 0.8 %
BILIRUB SERPL-MCNC: 0.9 MG/DL (ref 0–1)
BUN BLDV-MCNC: 28 MG/DL (ref 7–20)
CALCIUM SERPL-MCNC: 9.8 MG/DL (ref 8.3–10.6)
CHLORIDE BLD-SCNC: 101 MMOL/L (ref 99–110)
CO2: 28 MMOL/L (ref 21–32)
CREAT SERPL-MCNC: 1.2 MG/DL (ref 0.8–1.3)
EOSINOPHILS ABSOLUTE: 0.1 K/UL (ref 0–0.6)
EOSINOPHILS RELATIVE PERCENT: 1.3 %
GFR AFRICAN AMERICAN: >60
GFR NON-AFRICAN AMERICAN: 57
GLOBULIN: 3 G/DL
GLUCOSE BLD-MCNC: 115 MG/DL (ref 70–99)
HCT VFR BLD CALC: 43.7 % (ref 40.5–52.5)
HEMOGLOBIN: 14.3 G/DL (ref 13.5–17.5)
LYMPHOCYTES ABSOLUTE: 1.4 K/UL (ref 1–5.1)
LYMPHOCYTES RELATIVE PERCENT: 23 %
MCH RBC QN AUTO: 30.6 PG (ref 26–34)
MCHC RBC AUTO-ENTMCNC: 32.6 G/DL (ref 31–36)
MCV RBC AUTO: 93.8 FL (ref 80–100)
MONOCYTES ABSOLUTE: 0.9 K/UL (ref 0–1.3)
MONOCYTES RELATIVE PERCENT: 15.3 %
NEUTROPHILS ABSOLUTE: 3.5 K/UL (ref 1.7–7.7)
NEUTROPHILS RELATIVE PERCENT: 59.6 %
PDW BLD-RTO: 13.7 % (ref 12.4–15.4)
PLATELET # BLD: 230 K/UL (ref 135–450)
PMV BLD AUTO: 8.4 FL (ref 5–10.5)
POTASSIUM SERPL-SCNC: 4.2 MMOL/L (ref 3.5–5.1)
PRO-BNP: 171 PG/ML (ref 0–449)
RBC # BLD: 4.66 M/UL (ref 4.2–5.9)
SODIUM BLD-SCNC: 139 MMOL/L (ref 136–145)
TOTAL PROTEIN: 7.3 G/DL (ref 6.4–8.2)
TROPONIN: <0.01 NG/ML
TROPONIN: <0.01 NG/ML
WBC # BLD: 5.9 K/UL (ref 4–11)

## 2021-04-10 PROCEDURE — 83880 ASSAY OF NATRIURETIC PEPTIDE: CPT

## 2021-04-10 PROCEDURE — 71045 X-RAY EXAM CHEST 1 VIEW: CPT

## 2021-04-10 PROCEDURE — 84484 ASSAY OF TROPONIN QUANT: CPT

## 2021-04-10 PROCEDURE — G0378 HOSPITAL OBSERVATION PER HR: HCPCS

## 2021-04-10 PROCEDURE — 96372 THER/PROPH/DIAG INJ SC/IM: CPT

## 2021-04-10 PROCEDURE — 2580000003 HC RX 258: Performed by: INTERNAL MEDICINE

## 2021-04-10 PROCEDURE — 6360000002 HC RX W HCPCS: Performed by: INTERNAL MEDICINE

## 2021-04-10 PROCEDURE — 80053 COMPREHEN METABOLIC PANEL: CPT

## 2021-04-10 PROCEDURE — 93005 ELECTROCARDIOGRAM TRACING: CPT

## 2021-04-10 PROCEDURE — 99283 EMERGENCY DEPT VISIT LOW MDM: CPT

## 2021-04-10 PROCEDURE — 6370000000 HC RX 637 (ALT 250 FOR IP): Performed by: INTERNAL MEDICINE

## 2021-04-10 PROCEDURE — 36415 COLL VENOUS BLD VENIPUNCTURE: CPT

## 2021-04-10 PROCEDURE — 85025 COMPLETE CBC W/AUTO DIFF WBC: CPT

## 2021-04-10 PROCEDURE — 70450 CT HEAD/BRAIN W/O DYE: CPT

## 2021-04-10 PROCEDURE — 6370000000 HC RX 637 (ALT 250 FOR IP): Performed by: PHYSICIAN ASSISTANT

## 2021-04-10 RX ORDER — SODIUM CHLORIDE 0.9 % (FLUSH) 0.9 %
5-40 SYRINGE (ML) INJECTION EVERY 12 HOURS SCHEDULED
Status: DISCONTINUED | OUTPATIENT
Start: 2021-04-10 | End: 2021-04-12 | Stop reason: HOSPADM

## 2021-04-10 RX ORDER — PROMETHAZINE HYDROCHLORIDE 25 MG/1
12.5 TABLET ORAL EVERY 6 HOURS PRN
Status: DISCONTINUED | OUTPATIENT
Start: 2021-04-10 | End: 2021-04-12 | Stop reason: HOSPADM

## 2021-04-10 RX ORDER — LOSARTAN POTASSIUM 100 MG/1
100 TABLET ORAL DAILY
Status: DISCONTINUED | OUTPATIENT
Start: 2021-04-10 | End: 2021-04-12 | Stop reason: HOSPADM

## 2021-04-10 RX ORDER — SODIUM CHLORIDE 0.9 % (FLUSH) 0.9 %
5-40 SYRINGE (ML) INJECTION PRN
Status: DISCONTINUED | OUTPATIENT
Start: 2021-04-10 | End: 2021-04-12 | Stop reason: HOSPADM

## 2021-04-10 RX ORDER — FUROSEMIDE 20 MG/1
20 TABLET ORAL DAILY
Status: DISCONTINUED | OUTPATIENT
Start: 2021-04-10 | End: 2021-04-12 | Stop reason: HOSPADM

## 2021-04-10 RX ORDER — ACETAMINOPHEN 650 MG/1
650 SUPPOSITORY RECTAL EVERY 6 HOURS PRN
Status: DISCONTINUED | OUTPATIENT
Start: 2021-04-10 | End: 2021-04-12 | Stop reason: HOSPADM

## 2021-04-10 RX ORDER — LATANOPROST 50 UG/ML
1 SOLUTION/ DROPS OPHTHALMIC NIGHTLY
Status: DISCONTINUED | OUTPATIENT
Start: 2021-04-10 | End: 2021-04-12 | Stop reason: HOSPADM

## 2021-04-10 RX ORDER — ACETAMINOPHEN 325 MG/1
325 TABLET ORAL ONCE
Status: COMPLETED | OUTPATIENT
Start: 2021-04-10 | End: 2021-04-10

## 2021-04-10 RX ORDER — ONDANSETRON 2 MG/ML
4 INJECTION INTRAMUSCULAR; INTRAVENOUS EVERY 6 HOURS PRN
Status: DISCONTINUED | OUTPATIENT
Start: 2021-04-10 | End: 2021-04-12 | Stop reason: HOSPADM

## 2021-04-10 RX ORDER — TRAMADOL HYDROCHLORIDE 50 MG/1
50 TABLET ORAL EVERY 6 HOURS PRN
COMMUNITY
End: 2021-04-23 | Stop reason: SDUPTHER

## 2021-04-10 RX ORDER — TELMISARTAN AND HYDROCHLORTHIAZIDE 80; 25 MG/1; MG/1
1 TABLET ORAL DAILY
Status: DISCONTINUED | OUTPATIENT
Start: 2021-04-10 | End: 2021-04-10 | Stop reason: CLARIF

## 2021-04-10 RX ORDER — SODIUM CHLORIDE 9 MG/ML
25 INJECTION, SOLUTION INTRAVENOUS PRN
Status: DISCONTINUED | OUTPATIENT
Start: 2021-04-10 | End: 2021-04-12 | Stop reason: HOSPADM

## 2021-04-10 RX ORDER — HYDROCHLOROTHIAZIDE 25 MG/1
25 TABLET ORAL DAILY
Status: DISCONTINUED | OUTPATIENT
Start: 2021-04-10 | End: 2021-04-12 | Stop reason: HOSPADM

## 2021-04-10 RX ORDER — PRAVASTATIN SODIUM 20 MG
20 TABLET ORAL NIGHTLY
Status: DISCONTINUED | OUTPATIENT
Start: 2021-04-10 | End: 2021-04-12 | Stop reason: HOSPADM

## 2021-04-10 RX ORDER — IPRATROPIUM BROMIDE AND ALBUTEROL SULFATE 2.5; .5 MG/3ML; MG/3ML
1 SOLUTION RESPIRATORY (INHALATION) EVERY 6 HOURS PRN
Status: DISCONTINUED | OUTPATIENT
Start: 2021-04-10 | End: 2021-04-12 | Stop reason: HOSPADM

## 2021-04-10 RX ORDER — ACETAMINOPHEN 325 MG/1
650 TABLET ORAL EVERY 6 HOURS PRN
Status: DISCONTINUED | OUTPATIENT
Start: 2021-04-10 | End: 2021-04-12 | Stop reason: HOSPADM

## 2021-04-10 RX ORDER — POLYETHYLENE GLYCOL 3350 17 G/17G
17 POWDER, FOR SOLUTION ORAL DAILY PRN
Status: DISCONTINUED | OUTPATIENT
Start: 2021-04-10 | End: 2021-04-12 | Stop reason: HOSPADM

## 2021-04-10 RX ADMIN — ACETAMINOPHEN 325 MG: 325 TABLET ORAL at 11:58

## 2021-04-10 RX ADMIN — Medication 10 ML: at 20:19

## 2021-04-10 RX ADMIN — ENOXAPARIN SODIUM 40 MG: 40 INJECTION SUBCUTANEOUS at 20:19

## 2021-04-10 RX ADMIN — PRAVASTATIN SODIUM 20 MG: 20 TABLET ORAL at 20:19

## 2021-04-10 RX ADMIN — NITROGLYCERIN 1 INCH: 20 OINTMENT TOPICAL at 12:41

## 2021-04-10 RX ADMIN — ACETAMINOPHEN 650 MG: 325 TABLET ORAL at 17:49

## 2021-04-10 ASSESSMENT — ENCOUNTER SYMPTOMS
SHORTNESS OF BREATH: 1
DIARRHEA: 0
STRIDOR: 0
WHEEZING: 0
TROUBLE SWALLOWING: 0
SORE THROAT: 0
CONSTIPATION: 0
FACIAL SWELLING: 0
COLOR CHANGE: 0
COUGH: 1
RHINORRHEA: 0
NAUSEA: 0
ABDOMINAL DISTENTION: 0
VOMITING: 0
BACK PAIN: 0
VOICE CHANGE: 0
ABDOMINAL PAIN: 0

## 2021-04-10 ASSESSMENT — PAIN SCALES - GENERAL
PAINLEVEL_OUTOF10: 0
PAINLEVEL_OUTOF10: 0

## 2021-04-10 NOTE — PROGRESS NOTES
Pt admitted to room 3302 from ED. VSS on room air. O x 4. Pt lives at home and was experiencing CP. Admission Dx: CP. Pt is independent. Pt oriented to room and updated on POC. Pt understands and has no further questions. Call light and bedside table within reach. Safety socks on and  bed in lowest position with 2/4 siderails up. MT verified telemetry.  [facility]

## 2021-04-10 NOTE — ED NOTES
Presents to the ED rt intermittent CP lasting one week in duration. Describes pain to the center of chest & to left arm upon reaching for things; became increasingly concerned this morning when he noticed \"heart skipping a beat on home monitor & pain once beat was missed. \"  Denies other complaints at this time. Monitors in place.        Doris Ferguson RN  04/10/21 0484

## 2021-04-10 NOTE — ED PROVIDER NOTES
The Ekg interpreted by me in the absence of a cardiologist shows. normal sinus rhythm with a rate of 82  Axis is   Normal  QTc is  normal  Intervals and Durations are unremarkable. No specific ST changes appreciated. Lateral T wave flattening is new from August 13, 2014      I only performed EKG interpretation and was not otherwise involved in the care of this patient.            Priti Elliott MD  04/10/21 5050

## 2021-04-10 NOTE — ED NOTES
Bed: 09  Expected date:   Expected time:   Means of arrival:   Comments:  [address], RN  04/10/21 9916

## 2021-04-10 NOTE — ED PROVIDER NOTES
[facility]        Pt Name: Georgina Nascimento  MRN: [id number]  [place] 5/14/1930  Date of evaluation: 4/10/2021  Provider: Daniella Cano PA-C  PCP: Easton Pisano MD    DAVID. I have evaluated this patient. My supervising physician was available for consultation. CHIEF COMPLAINT       Chief Complaint   Patient presents with    Chest Pain     Patient reports he is having left sided chest pains every time his heart beats. Also with c/o intermittent headaches. HISTORY OF PRESENT ILLNESS   (Location, Timing/Onset, Context/Setting, Quality, Duration, Modifying Factors, Severity, Associated Signs and Symptoms)  Note limiting factors. Georgina Nascimento is a 80 y.o. male who is hard of hearing with history of atrial fibrillation, hypertension and hyperlipidemia who presents complaining of intermittent palpitations, left-sided chest discomfort and shortness of breath for 2 days. He denies any sharp stabbing pleuritic pain. He also reports intermittent frontal headaches for 1 week. At this time he is not complaining of any chest pain. He currently rates his headache to be a 1 out of 10 on pain scale. This is not the worst headache of his life, thunderclap description, abrupt onset or temporal in origin. He has been taking a full-strength aspirin daily for his headache. He took a full-strength aspirin this morning. He denies any acute vision changes. Does have history of seasonal allergies and therefore does get postnasal drip, sneezing and coughing during this spring season. Nursing Notes were all reviewed and agreed with or any disagreements were addressed in the HPI. REVIEW OF SYSTEMS    (2-9 systems for level 4, 10 or more for level 5)     Review of Systems   Constitutional: Negative for chills and fever. HENT: Positive for congestion, postnasal drip and sneezing.  Negative for dental problem, drooling, ear discharge, ear pain, facial swelling, hearing loss, mouth sores, nosebleeds, rhinorrhea, sore throat, tinnitus, trouble swallowing and voice change. Eyes: Negative for visual disturbance. Respiratory: Positive for cough and shortness of breath. Negative for wheezing and stridor. Cardiovascular: Positive for chest pain and palpitations. Negative for leg swelling. Gastrointestinal: Negative for abdominal distention, abdominal pain, constipation, diarrhea, nausea and vomiting. Endocrine: Negative. Genitourinary: Negative. Musculoskeletal: Negative for back pain, neck pain and neck stiffness. Skin: Negative for color change, pallor, rash and wound. Neurological: Positive for headaches. Negative for dizziness, tremors, seizures, syncope, facial asymmetry, speech difficulty, weakness, light-headedness and numbness. Psychiatric/Behavioral: Negative for confusion. All other systems reviewed and are negative. Positives and Pertinent negatives as per HPI. Except as noted above in the ROS, all other systems were reviewed and negative.        PAST MEDICAL HISTORY     Past Medical History:   Diagnosis Date    Afib ([facility] [place] [address]     Anxiety     Atrial fibrillation (HCC)     Benign essential hypertension     Cervical back pain with evidence of disc disease     Cramps, extremity     Elevated prostate specific antigen (PSA)     history of    Emphysema     Hypercholesteremia     Kidney stones          SURGICAL HISTORY     Past Surgical History:   Procedure Laterality Date    APPENDECTOMY      CARPAL TUNNEL RELEASE Bilateral     COCHLEAR IMPLANT  2017    bilat -service related - done at [address] [facility] [place]      cataract with lens implant    HERNIA REPAIR      rt side    KNEE ARTHROSCOPY Bilateral     THROAT SURGERY      uvula resected, and nasal surgery for sleep apnea    TURP  2007    VASECTOMY           CURRENTMEDICATIONS       Previous Medications    FUROSEMIDE (LASIX) 20 MG TABLET    TAKE 1 TABLET BY MOUTH DAILY FOR SWELLING    IPRATROPIUM-ALBUTEROL (DUONEB) 0.5-2.5 (3) MG/3ML SOLN NEBULIZER SOLUTION    Take 3 mLs by nebulization every 6 hours as needed for Shortness of Breath. LATANOPROST (XALATAN) 0.005 % OPHTHALMIC SOLUTION    Place 1 drop into both eyes nightly. POTASSIUM CHLORIDE (KLOR-CON M) 10 MEQ EXTENDED RELEASE TABLET    TAKE 1 TABLET BY MOUTH DAILY WITH FUROSEMIDE AS NEEDED FOR SWELLING    PRAVASTATIN (PRAVACHOL) 40 MG TABLET    TAKE 1 TABLET BY MOUTH EVERY OTHER DAY    TELMISARTAN-HYDROCHLOROTHIAZIDE (MICARDIS HCT) 80-25 MG PER TABLET    TAKE 1 TABLET BY MOUTH DAILY    TRAMADOL (ULTRAM) 50 MG TABLET    Take 50 mg by mouth every 6 hours as needed for Pain. VITAMIN D 25 MCG (1000 UT) CAPS    Take 1 capsule by mouth nightly         ALLERGIES     Codeine and Timolol maleate    FAMILYHISTORY       Family History   Problem Relation Age of Onset    Stroke Father 68          SOCIAL HISTORY       Social History     Tobacco Use    Smoking status: Never Smoker    Smokeless tobacco: Never Used   Substance Use Topics    Alcohol use: No     Alcohol/week: 0.0 standard drinks    Drug use: No       SCREENINGS   NIH Stroke Scale  NIH Stroke Scale Assessed: Yes  Interval: Baseline  Level of Consciousness (1a. ): Alert  LOC Questions (1b. ):  Answers both correctly  LOC Commands (1c. ): Performs both tasks correctly  Best Gaze (2. ): Normal  Visual (3. ): No visual loss  Facial Palsy (4. ): Normal symmetrical movement  Motor Arm, Left (5a. ): No drift  Motor Arm, Right (5b. ): No drift  Motor Leg, Left (6a. ): No drift  Motor Leg, Right (6b. ): No drift  Limb Ataxia (7. ): Absent  Sensory (8. ): Normal  Best Language (9. ): No aphasia  Dysarthria (10. ): Normal  Extinction and Inattention (11): No abnormality  Total: 0         PHYSICAL EXAM    (up to 7 for level 4, 8 or more for level 5)     ED Triage Vitals [04/10/21 1121]   BP Temp Temp Source Pulse Resp SpO2 Height Weight   (!) 159/87 97.7 °F (36.5 °C) Infrared 97 16 96 % 5' 4\" (1.626 m) 212 lb (96.2 kg)       Physical Exam  Vitals signs and nursing note reviewed. Constitutional:       Appearance: Normal appearance. He is well-developed. He is obese. He is not toxic-appearing or diaphoretic. HENT:      Head: Normocephalic and atraumatic. Right Ear: External ear normal.      Left Ear: External ear normal.      Nose: Nose normal.      Mouth/Throat:      Mouth: Mucous membranes are moist.      Pharynx: Oropharynx is clear. Eyes:      General: No scleral icterus. Right eye: No discharge. Left eye: No discharge. Extraocular Movements: Extraocular movements intact. Conjunctiva/sclera: Conjunctivae normal.      Pupils: Pupils are equal, round, and reactive to light. Neck:      Musculoskeletal: Normal range of motion. Cardiovascular:      Rate and Rhythm: Normal rate. Pulmonary:      Effort: Pulmonary effort is normal.      Breath sounds: Normal breath sounds. Abdominal:      General: Bowel sounds are normal.      Palpations: Abdomen is soft. Musculoskeletal: Normal range of motion. Skin:     General: Skin is warm and dry. Capillary Refill: Capillary refill takes less than 2 seconds. Coloration: Skin is not jaundiced or pale. Findings: No bruising, erythema, lesion or rash. Neurological:      Mental Status: He is alert and oriented to person, place, and time. Mental status is at baseline.    Psychiatric:         Mood and Affect: Mood normal.         Behavior: Behavior normal.         DIAGNOSTIC RESULTS   LABS:    Labs Reviewed   COMPREHENSIVE METABOLIC PANEL - Abnormal; Notable for the following components:       Result Value    Glucose 115 (*)     BUN 28 (*)     GFR Non- 57 (*)     All other components within normal limits    Narrative:     Performed at:  [facility] [address], [place], [facility] [address]   Phone [phone]   CBC WITH AUTO DIFFERENTIAL    Narrative:     Performed at:  [facility]  [address] [facility],  [place], [address]   Phone [phone]   TROPONIN    Narrative:     Performed at:  [facility]  [address] [facility],  [place], [address]   Phone [phone]   BRAIN NATRIURETIC PEPTIDE    Narrative:     Performed at:  [facility]  [address] [facility]  [place], [address]   Phone [phone]       All other labs were within normal range or not returned as of this dictation. EKG: All EKG's are interpreted by the Emergency Department Physician, Dr Roxanna An in the absence of a cardiologist.  Please see their note for interpretation of EKG. RADIOLOGY:   Non-plain film images such as CT, Ultrasound and MRI are read by the radiologist. Plain radiographic images are visualized and preliminarily interpreted by the ED Provider with the below findings:        Interpretation per the Radiologist below, if available at the time of this note:    XR CHEST PORTABLE   Final Result   1. No acute abnormality. CT HEAD WO CONTRAST   Final Result   1. No acute intracranial abnormality.                PROCEDURES   Unless otherwise noted below, none     Procedures    CRITICAL CARE TIME   N/A    CONSULTS:  IP CONSULT TO HOSPITALIST      EMERGENCY DEPARTMENT COURSE and DIFFERENTIAL DIAGNOSIS/MDM:   Vitals:    Vitals:    04/10/21 1121 04/10/21 1222   BP: (!) 159/87 (!) 153/63   Pulse: 97 91   Resp: 16 28   Temp: 97.7 °F (36.5 °C)    TempSrc: Infrared    SpO2: 96% 97%   Weight: 212 lb (96.2 kg)    Height: 5' 4\" (1.626 m)        Patient was given the following medications:  Medications   acetaminophen (TYLENOL) tablet 325 mg (325 mg Oral Given 4/10/21 1158)   nitroglycerin (NITRO-BID) 2 % ointment 1 inch (1 inch Topical Given 4/10/21 1241)           This patient presents to the emergency department complaining of intermittent left-sided chest pain and palpitations for 2 days. At this time, he is chest pain-free. Given his risk factors, I do feel admission is warranted for chest pain evaluation.  ekg shows Some subtle lateral T wave changes. My suspicion is low for STEMI,  PE, myocarditis, pericarditis, endocarditis, acute pulmonary edema, pleural effusion, pericardial effusion, cardiac tamponade, CHF exacerbation, thoracic aortic dissection, esophageal rupture, other life-threatening arrhythmia, hypertensive urgency or emergency, hemothorax, pulmonary contusion, subcutaneous emphysema, flail chest, pneumo mediastinum, rib fracture, pneumonia, pneumothorax, covid 19 or other concerning pathology. Also complains of intermittent headache for one week. NIH scale zero. Ct head negative. He took  Aspirin prior to arrival. Nitro and tylenol ordered. My suspicion is low for carotid dissection, sinus abscess, acute fracture, acute CVA, ICH, SAH, TIA, meningitis, encephalitis, pseudotumor cerebri, temporal arteritis, sentinel bleed from ruptured aneurysm,  subdural hematoma, epidural hematoma, cerebellar compromise, posterior stroke. FINAL IMPRESSION      1. Acute chest pain    2. Acute nonintractable headache, unspecified headache type    3. Abnormal EKG          DISPOSITION/PLAN   DISPOSITION Decision To Admit 04/10/2021 01:02:16 PM      PATIENT REFERREDTO:  No follow-up provider specified.     DISCHARGE MEDICATIONS:  New Prescriptions    No medications on file       DISCONTINUED MEDICATIONS:  Discontinued Medications    No medications on file              (Please note that portions of this note were completed with a voice recognition program.  Efforts were made to edit the dictations but occasionally words are mis-transcribed.)    Jonathan Hamlin PA-C (electronically signed)           Jonathan Hamlin PA-C  04/10/21 7170

## 2021-04-10 NOTE — PLAN OF CARE
Problem: Cardiovascular  Goal: No DVT, peripheral vascular complications  Outcome: Ongoing     Problem: Cardiovascular  Goal: Hemodynamic stability  Outcome: Ongoing     Problem: Respiratory  Goal: O2 Sat > 90%  Outcome: Ongoing     Problem: Falls - Risk of:  Goal: Absence of physical injury  Description: Absence of physical injury  Outcome: Ongoing

## 2021-04-10 NOTE — ED NOTES
Bedside report given to ZOEY Peterson. Tele in place & chart provided.   VSS & alert at this time      Sudha Partida RN  04/10/21 2278

## 2021-04-10 NOTE — H&P
HOSPITALISTS HISTORY AND PHYSICAL    4/10/2021 1:38 PM    Patient Information:  Agustin Green is a 80 y.o. male [id number]  PCP:  Andi Castellanos MD (Tel: [phone] )    Chief complaint:    Chief Complaint   Patient presents with    Chest Pain     Patient reports he is having left sided chest pains every time his heart beats. Also with c/o intermittent headaches. History of Present Illness:  Jak Berg is a 80 y.o. male having on and off chest pain at rest that is not worse by exertion for the last couple days. Patient states he noticed that his heart beats and with every beat he has midsternal pressure-like chest pain that radiates to his left arm and does not get worse with walking or exertion. Patient actually cut his lawn this week without any chest pain. Patient's denies any shortness of breath with it no nausea or vomiting no diaphoresis. Patient last had a stress test in 2012 which was negative never had a heart attack or any mini strokes. Patient's dad had a stroke in his 76s and brother had an MI in his 76s. Patient does not smoke or drink            REVIEW OF SYSTEMS:   Constitutional: Negative for fever,chills or night sweats  ENT: Negative for rhinorrhea, epistaxis, hoarseness, sore throat. Respiratory: Negative for shortness of breath,wheezing  Cardiovascular:see above  Gastrointestinal: Negative for nausea, vomiting, diarrhea  Genitourinary: Negative for polyuria, dysuria   Hematologic/Lymphatic: Negative for bleeding tendency, easy bruising  Musculoskeletal: Negative for myalgias and arthralgias  Neurologic: Negative for confusion,dysarthria. Skin: Negative for itching,rash  Psychiatric: Negative for depression,anxiety, agitation. Endocrine: Negative for polydipsia,polyuria,heat /cold intolerance.     Past Medical History:   has a past medical history of Afib ([address] Anxiety, Atrial fibrillation (HCC), Benign essential hypertension, Cervical back pain with evidence of disc disease, Cramps, extremity, Elevated prostate specific antigen (PSA), Emphysema, Hypercholesteremia, and Kidney stones. Past Surgical History:   has a past surgical history that includes Throat surgery; eye surgery; hernia repair; Colonoscopy; Appendectomy; Vasectomy; Knee arthroscopy (Bilateral); TURP (2007); Cochlear implant (2017); and Carpal tunnel release (Bilateral). Medications:  No current facility-administered medications on file prior to encounter. Current Outpatient Medications on File Prior to Encounter   Medication Sig Dispense Refill    traMADol (ULTRAM) 50 MG tablet Take 50 mg by mouth every 6 hours as needed for Pain.  telmisartan-hydrochlorothiazide (MICARDIS HCT) 80-25 MG per tablet TAKE 1 TABLET BY MOUTH DAILY 90 tablet 0    furosemide (LASIX) 20 MG tablet TAKE 1 TABLET BY MOUTH DAILY FOR SWELLING 90 tablet 0    potassium chloride (KLOR-CON M) 10 MEQ extended release tablet TAKE 1 TABLET BY MOUTH DAILY WITH FUROSEMIDE AS NEEDED FOR SWELLING 90 tablet 1    vitamin D 25 MCG (1000 UT) CAPS Take 1 capsule by mouth nightly      pravastatin (PRAVACHOL) 40 MG tablet TAKE 1 TABLET BY MOUTH EVERY OTHER DAY 45 tablet 3    ipratropium-albuterol (DUONEB) 0.5-2.5 (3) MG/3ML SOLN nebulizer solution Take 3 mLs by nebulization every 6 hours as needed for Shortness of Breath. 360 mL 1    latanoprost (XALATAN) 0.005 % ophthalmic solution Place 1 drop into both eyes nightly. Allergies: Allergies   Allergen Reactions    Codeine      headache    Timolol Maleate      Short of breath        Social History:  Patient Lives at home   reports that he has never smoked. He has never used smokeless tobacco. He reports that he does not drink alcohol or use drugs.      Family History:  family history includes Stroke (age of onset: 68) in his father. ,     Physical Exam:  BP (!) 153/63 June Ventura MD    4/10/2021 1:38 PM

## 2021-04-11 LAB
ANION GAP SERPL CALCULATED.3IONS-SCNC: 8 MMOL/L (ref 3–16)
BASOPHILS ABSOLUTE: 0.1 K/UL (ref 0–0.2)
BASOPHILS RELATIVE PERCENT: 0.9 %
BUN BLDV-MCNC: 26 MG/DL (ref 7–20)
CALCIUM SERPL-MCNC: 9.5 MG/DL (ref 8.3–10.6)
CHLORIDE BLD-SCNC: 102 MMOL/L (ref 99–110)
CO2: 30 MMOL/L (ref 21–32)
CREAT SERPL-MCNC: 1.1 MG/DL (ref 0.8–1.3)
EKG ATRIAL RATE: 82 BPM
EKG DIAGNOSIS: NORMAL
EKG P AXIS: 31 DEGREES
EKG P-R INTERVAL: 140 MS
EKG Q-T INTERVAL: 378 MS
EKG QRS DURATION: 86 MS
EKG QTC CALCULATION (BAZETT): 441 MS
EKG R AXIS: 18 DEGREES
EKG T AXIS: 57 DEGREES
EKG VENTRICULAR RATE: 82 BPM
EOSINOPHILS ABSOLUTE: 0.1 K/UL (ref 0–0.6)
EOSINOPHILS RELATIVE PERCENT: 1.5 %
GFR AFRICAN AMERICAN: >60
GFR NON-AFRICAN AMERICAN: >60
GLUCOSE BLD-MCNC: 104 MG/DL (ref 70–99)
HCT VFR BLD CALC: 41.4 % (ref 40.5–52.5)
HEMOGLOBIN: 13.6 G/DL (ref 13.5–17.5)
LYMPHOCYTES ABSOLUTE: 1.6 K/UL (ref 1–5.1)
LYMPHOCYTES RELATIVE PERCENT: 24.3 %
MCH RBC QN AUTO: 30.4 PG (ref 26–34)
MCHC RBC AUTO-ENTMCNC: 32.7 G/DL (ref 31–36)
MCV RBC AUTO: 93 FL (ref 80–100)
MONOCYTES ABSOLUTE: 0.9 K/UL (ref 0–1.3)
MONOCYTES RELATIVE PERCENT: 13.8 %
NEUTROPHILS ABSOLUTE: 3.9 K/UL (ref 1.7–7.7)
NEUTROPHILS RELATIVE PERCENT: 59.5 %
PDW BLD-RTO: 13.8 % (ref 12.4–15.4)
PLATELET # BLD: 212 K/UL (ref 135–450)
PMV BLD AUTO: 8.6 FL (ref 5–10.5)
POTASSIUM REFLEX MAGNESIUM: 4 MMOL/L (ref 3.5–5.1)
RBC # BLD: 4.46 M/UL (ref 4.2–5.9)
SODIUM BLD-SCNC: 140 MMOL/L (ref 136–145)
TSH REFLEX: 3.64 UIU/ML (ref 0.27–4.2)
WBC # BLD: 6.5 K/UL (ref 4–11)

## 2021-04-11 PROCEDURE — 99223 1ST HOSP IP/OBS HIGH 75: CPT | Performed by: INTERNAL MEDICINE

## 2021-04-11 PROCEDURE — 2580000003 HC RX 258: Performed by: INTERNAL MEDICINE

## 2021-04-11 PROCEDURE — 96372 THER/PROPH/DIAG INJ SC/IM: CPT

## 2021-04-11 PROCEDURE — 85025 COMPLETE CBC W/AUTO DIFF WBC: CPT

## 2021-04-11 PROCEDURE — G0378 HOSPITAL OBSERVATION PER HR: HCPCS

## 2021-04-11 PROCEDURE — 94762 N-INVAS EAR/PLS OXIMTRY CONT: CPT

## 2021-04-11 PROCEDURE — 6360000002 HC RX W HCPCS: Performed by: INTERNAL MEDICINE

## 2021-04-11 PROCEDURE — 80048 BASIC METABOLIC PNL TOTAL CA: CPT

## 2021-04-11 PROCEDURE — 36415 COLL VENOUS BLD VENIPUNCTURE: CPT

## 2021-04-11 PROCEDURE — 93010 ELECTROCARDIOGRAM REPORT: CPT | Performed by: INTERNAL MEDICINE

## 2021-04-11 PROCEDURE — 6370000000 HC RX 637 (ALT 250 FOR IP): Performed by: INTERNAL MEDICINE

## 2021-04-11 PROCEDURE — 84443 ASSAY THYROID STIM HORMONE: CPT

## 2021-04-11 RX ORDER — DILTIAZEM HYDROCHLORIDE 60 MG/1
60 CAPSULE, EXTENDED RELEASE ORAL 2 TIMES DAILY
Status: DISCONTINUED | OUTPATIENT
Start: 2021-04-11 | End: 2021-04-12 | Stop reason: HOSPADM

## 2021-04-11 RX ADMIN — LOSARTAN POTASSIUM 100 MG: 100 TABLET, FILM COATED ORAL at 10:07

## 2021-04-11 RX ADMIN — ENOXAPARIN SODIUM 40 MG: 40 INJECTION SUBCUTANEOUS at 22:48

## 2021-04-11 RX ADMIN — FUROSEMIDE 20 MG: 20 TABLET ORAL at 10:07

## 2021-04-11 RX ADMIN — PRAVASTATIN SODIUM 20 MG: 20 TABLET ORAL at 22:45

## 2021-04-11 RX ADMIN — DILTIAZEM HYDROCHLORIDE 60 MG: 60 CAPSULE, EXTENDED RELEASE ORAL at 10:10

## 2021-04-11 RX ADMIN — Medication 10 ML: at 10:08

## 2021-04-11 RX ADMIN — Medication 10 ML: at 22:50

## 2021-04-11 RX ADMIN — DILTIAZEM HYDROCHLORIDE 60 MG: 60 CAPSULE, EXTENDED RELEASE ORAL at 22:46

## 2021-04-11 RX ADMIN — HYDROCHLOROTHIAZIDE 25 MG: 25 TABLET ORAL at 10:07

## 2021-04-11 ASSESSMENT — PAIN SCALES - GENERAL
PAINLEVEL_OUTOF10: 0

## 2021-04-11 NOTE — PLAN OF CARE
Problem: Cardiovascular  Goal: Hemodynamic stability  Note: Pt on telemetry; rate and rhythm monitored. Pt denies dizziness, CP, and palpitations. Pt instructed to notify nurse if symptoms occur. Medications administered. Electrolytes monitored. Will continue to monitor.       Problem: Respiratory  Goal: O2 Sat > 90%  Note: O2 sat>90%, clear breath sounds

## 2021-04-11 NOTE — PROGRESS NOTES
[facility]    4/11/2021 1:50 PM        Name: Yesi Marin . Admitted: 4/10/2021  Primary Care Provider: Cristela Bird MD (Tel: [phone])          Subjective:    lyingi n bed feeling about the same no nausea vomitting fever or chills      Reviewed interval ancillary notes    Current Medications  dilTIAZem (CARDIZEM 12 HR) extended release capsule 60 mg, BID  furosemide (LASIX) tablet 20 mg, Daily  ipratropium-albuterol (DUONEB) nebulizer solution 3 mL, Q6H PRN  latanoprost (XALATAN) 0.005 % ophthalmic solution 1 drop, Nightly  pravastatin (PRAVACHOL) tablet 20 mg, Nightly  perflutren lipid microspheres (DEFINITY) injection 1.65 mg, ONCE PRN  sodium chloride flush 0.9 % injection 5-40 mL, 2 times per day  sodium chloride flush 0.9 % injection 5-40 mL, PRN  0.9 % sodium chloride infusion, PRN  enoxaparin (LOVENOX) injection 40 mg, Nightly  promethazine (PHENERGAN) tablet 12.5 mg, Q6H PRN    Or  ondansetron (ZOFRAN) injection 4 mg, Q6H PRN  polyethylene glycol (GLYCOLAX) packet 17 g, Daily PRN  acetaminophen (TYLENOL) tablet 650 mg, Q6H PRN    Or  acetaminophen (TYLENOL) suppository 650 mg, Q6H PRN  losartan (COZAAR) tablet 100 mg, Daily    And  hydroCHLOROthiazide (HYDRODIURIL) tablet 25 mg, Daily        Objective:  BP (!) 165/88   Pulse 93   Temp 98.1 °F (36.7 °C) (Oral)   Resp 18   Ht 5' 4\" (1.626 m)   Wt 214 lb 3.2 oz (97.2 kg)   SpO2 95%   BMI 36.77 kg/m²     Intake/Output Summary (Last 24 hours) at 4/11/2021 1350  Last data filed at 4/11/2021 1330  Gross per 24 hour   Intake 1200 ml   Output 750 ml   Net 450 ml      Wt Readings from Last 3 Encounters:   04/11/21 214 lb 3.2 oz (97.2 kg)   03/03/20 215 lb (97.5 kg)   12/03/19 218 lb (98.9 kg)       General appearance:  Appears comfortable.  AAOx3  HEENT: atraumatic, Pupils equal, muscous membranes moist, no masses appreciated  Cardiovascular:

## 2021-04-11 NOTE — CONSULTS
and oriented. Follows command, No Gross deficit   · Skin: Skin is warm, No rash noted. · Psychiatric: Has a normal behavior       Scheduled Meds:   furosemide  20 mg Oral Daily    latanoprost  1 drop Both Eyes Nightly    pravastatin  20 mg Oral Nightly    sodium chloride flush  5-40 mL Intravenous 2 times per day    enoxaparin  40 mg Subcutaneous Nightly    losartan  100 mg Oral Daily    And    hydroCHLOROthiazide  25 mg Oral Daily     Continuous Infusions:   sodium chloride       PRN Meds:.ipratropium-albuterol, perflutren lipid microspheres, sodium chloride flush, sodium chloride, promethazine **OR** ondansetron, polyethylene glycol, acetaminophen **OR** acetaminophen     Review of System:  [x] Full ROS obtained and negative except as mentioned in HPI    Prior to Admission medications    Medication Sig Start Date End Date Taking? Authorizing Provider   traMADol (ULTRAM) 50 MG tablet Take 50 mg by mouth every 6 hours as needed for Pain. Yes Historical Provider, MD   telmisartan-hydrochlorothiazide (MICARDIS HCT) 80-25 MG per tablet TAKE 1 TABLET BY MOUTH DAILY 3/10/21  Yes Braxton Shepard MD   furosemide (LASIX) 20 MG tablet TAKE 1 TABLET BY MOUTH DAILY FOR SWELLING 3/1/21  Yes Artist Raymond APRN - CNP   potassium chloride (KLOR-CON M) 10 MEQ extended release tablet TAKE 1 TABLET BY MOUTH DAILY WITH FUROSEMIDE AS NEEDED FOR SWELLING 11/9/20  Yes SHANDA Macias - CNP   vitamin D 25 MCG (1000 UT) CAPS Take 1 capsule by mouth nightly   Yes Historical Provider, MD   pravastatin (PRAVACHOL) 40 MG tablet TAKE 1 TABLET BY MOUTH EVERY OTHER DAY 7/6/20  Yes Braxton Shepard MD   ipratropium-albuterol (DUONEB) 0.5-2.5 (3) MG/3ML SOLN nebulizer solution Take 3 mLs by nebulization every 6 hours as needed for Shortness of Breath. 3/23/15  Yes Jfef Reyes MD   latanoprost (XALATAN) 0.005 % ophthalmic solution Place 1 drop into both eyes nightly.    Yes Historical Provider, MD       Past Medical History:   Diagnosis Date    [facility]ib ([facility] [address]     Anxiety     Atrial fibrillation (HCC)     Benign essential hypertension     Cervical back pain with evidence of disc disease     Cramps, extremity     Elevated prostate specific antigen (PSA)     history of    Emphysema     Hypercholesteremia     Kidney stones         Past Surgical History:   Procedure Laterality Date    APPENDECTOMY      CARPAL TUNNEL RELEASE Bilateral     COCHLEAR IMPLANT  2017    bilat -service related - done at [address] [facility]      cataract with lens implant    HERNIA REPAIR      rt side    KNEE ARTHROSCOPY Bilateral     THROAT SURGERY      uvula resected, and nasal surgery for sleep apnea    TURP  2007    VASECTOMY         Allergies   Allergen Reactions    Codeine      headache    Timolol Maleate      Short of breath       Social History:  Reviewed. reports that he has never smoked. He has never used smokeless tobacco. He reports that he does not drink alcohol or use drugs. Family History:  Reviewed. Reviewed. No family history of SCD. Relevant and available labs, and cardiovascular diagnostics reviewed. Reviewed. Recent Labs     04/10/21  1212 04/11/21  0538    140   K 4.2 4.0    102   CO2 28 30   BUN 28* 26*   CREATININE 1.2 1.1     Recent Labs     04/10/21  1212 04/11/21  0538   WBC 5.9 6.5   HGB 14.3 13.6   HCT 43.7 41.4   MCV 93.8 93.0    212     Estimated Creatinine Clearance: 47 mL/min (based on SCr of 1.1 mg/dL). Lab Results   Component Value Date    BNP 68 01/16/2013    BNP 44 02/22/2012    BNP 41 08/09/2011       I independently reviewed all cardiac tracing from cardiac telemetry. I independently reviewed relevant and available cardiac diagnostic tests ECG, CXR, Echo, Stress test, Device interrogation, Holter, CT scan. Outside medical records via Care everywhere reviewed and summarized in H&P above.    Complex medical condition with multiple medical problems affecting prognosis and outcome of EP interventions    All questions and concerns were addressed to the patient/family. Alternatives to my treatment were discussed. I have discussed the above stated plan and the patient verbalized understanding and agreed with the plan. NOTE: This report was transcribed using voice recognition software. Every effort was made to ensure accuracy, however, inadvertent computerized transcription errors may be present.      Nery Moser MD, MPH  [facility]   Office: [phone]  Fax: [phone]

## 2021-04-12 VITALS
HEIGHT: 64 IN | TEMPERATURE: 97.8 F | WEIGHT: 214.2 LBS | DIASTOLIC BLOOD PRESSURE: 78 MMHG | OXYGEN SATURATION: 97 % | BODY MASS INDEX: 36.57 KG/M2 | HEART RATE: 90 BPM | RESPIRATION RATE: 16 BRPM | SYSTOLIC BLOOD PRESSURE: 149 MMHG

## 2021-04-12 DIAGNOSIS — I47.1 PAROXYSMAL ATRIAL TACHYCARDIA (HCC): Primary | ICD-10-CM

## 2021-04-12 PROBLEM — I47.19 PAROXYSMAL ATRIAL TACHYCARDIA: Status: ACTIVE | Noted: 2021-04-12

## 2021-04-12 PROBLEM — G47.33 OSA ON CPAP: Status: ACTIVE | Noted: 2021-04-12

## 2021-04-12 PROBLEM — Z99.89 OSA ON CPAP: Status: ACTIVE | Noted: 2021-04-12

## 2021-04-12 LAB
LV EF: 53 %
LV EF: 60 %
LVEF MODALITY: NORMAL
LVEF MODALITY: NORMAL

## 2021-04-12 PROCEDURE — 99232 SBSQ HOSP IP/OBS MODERATE 35: CPT | Performed by: INTERNAL MEDICINE

## 2021-04-12 PROCEDURE — 96374 THER/PROPH/DIAG INJ IV PUSH: CPT

## 2021-04-12 PROCEDURE — 93017 CV STRESS TEST TRACING ONLY: CPT | Performed by: INTERNAL MEDICINE

## 2021-04-12 PROCEDURE — 78452 HT MUSCLE IMAGE SPECT MULT: CPT | Performed by: INTERNAL MEDICINE

## 2021-04-12 PROCEDURE — 1200000000 HC SEMI PRIVATE

## 2021-04-12 PROCEDURE — 94760 N-INVAS EAR/PLS OXIMETRY 1: CPT

## 2021-04-12 PROCEDURE — 94761 N-INVAS EAR/PLS OXIMETRY MLT: CPT

## 2021-04-12 PROCEDURE — A9502 TC99M TETROFOSMIN: HCPCS | Performed by: INTERNAL MEDICINE

## 2021-04-12 PROCEDURE — 2580000003 HC RX 258: Performed by: INTERNAL MEDICINE

## 2021-04-12 PROCEDURE — 6360000002 HC RX W HCPCS: Performed by: INTERNAL MEDICINE

## 2021-04-12 PROCEDURE — 93228 REMOTE 30 DAY ECG REV/REPORT: CPT | Performed by: INTERNAL MEDICINE

## 2021-04-12 PROCEDURE — 3430000000 HC RX DIAGNOSTIC RADIOPHARMACEUTICAL: Performed by: INTERNAL MEDICINE

## 2021-04-12 PROCEDURE — 93306 TTE W/DOPPLER COMPLETE: CPT

## 2021-04-12 RX ORDER — KETOROLAC TROMETHAMINE 30 MG/ML
15 INJECTION, SOLUTION INTRAMUSCULAR; INTRAVENOUS ONCE
Status: COMPLETED | OUTPATIENT
Start: 2021-04-12 | End: 2021-04-12

## 2021-04-12 RX ORDER — DILTIAZEM HYDROCHLORIDE 60 MG/1
60 CAPSULE, EXTENDED RELEASE ORAL 2 TIMES DAILY
Qty: 60 CAPSULE | Refills: 0 | Status: SHIPPED | OUTPATIENT
Start: 2021-04-12 | End: 2021-05-03 | Stop reason: SDUPTHER

## 2021-04-12 RX ORDER — AMINOPHYLLINE DIHYDRATE 25 MG/ML
100 INJECTION, SOLUTION INTRAVENOUS ONCE
Status: DISCONTINUED | OUTPATIENT
Start: 2021-04-12 | End: 2021-04-12 | Stop reason: HOSPADM

## 2021-04-12 RX ADMIN — TETROFOSMIN 10 MILLICURIE: 1.38 INJECTION, POWDER, LYOPHILIZED, FOR SOLUTION INTRAVENOUS at 08:24

## 2021-04-12 RX ADMIN — TETROFOSMIN 30 MILLICURIE: 1.38 INJECTION, POWDER, LYOPHILIZED, FOR SOLUTION INTRAVENOUS at 10:09

## 2021-04-12 RX ADMIN — Medication 10 ML: at 08:00

## 2021-04-12 RX ADMIN — KETOROLAC TROMETHAMINE 15 MG: 30 INJECTION, SOLUTION INTRAMUSCULAR at 13:57

## 2021-04-12 ASSESSMENT — PAIN SCALES - GENERAL
PAINLEVEL_OUTOF10: 3
PAINLEVEL_OUTOF10: 0

## 2021-04-12 NOTE — CARE COORDINATION
Patient admitted as Observation with an anticipated short hospitalization length of stay. Chart reviewed A&O, independent  and it appears that patient has minimal needs for discharge at this time. Discussed with patients nurse and requested that case management be notified if discharge needs are identified. Case management will continue to follow progress and update discharge plan as needed.

## 2021-04-12 NOTE — PROGRESS NOTES
Data- discharge order received, pt verbalized agreement to discharge, disposition to previous residence, no needs for HHC/DME. Action- discharge instructions prepared and given to pt, pt verbalized understanding. Medication information packet given r/t NEW and/or CHANGED prescriptions emphasizing name/purpose/side effects, pt verbalized understanding. Discharge instruction summary: Diet- general, Activity- as ilda, Primary Care Physician as follows: Marta Nieto -[phone] f/u appointment , immunizations reviewed, prescription medications filled [facility]. Response- Pt belongings gathered, IV removed. Disposition is home (no HHC/DME needs), transported with spouse, taken to lobby via w/c w/ staff, no complications.

## 2021-04-12 NOTE — PROGRESS NOTES
CLINICAL PHARMACY NOTE: MEDS TO [address] Select Patient?: Yes  Total # of Prescriptions Filled: 1   The following medications were delivered to the patient:  · Diltiazem ER 60 mg 12 hr   Total # of Interventions Completed: 0  Time Spent (min): 15    Additional Documentation:  Delivered to Patient  Baltazar Donis CPhT

## 2021-04-12 NOTE — DISCHARGE SUMMARY
Hospital Medicine Discharge Summary    Patient: Rodriguez Mahajan     Gender: male  : 1930   Age: 80 y.o. MRN: [id number]    Admitting Physician: Zachary Milton MD  Discharge Physician: Zachary Milton MD     Code Status: DNR-CCA     Admit Date: 4/10/2021   Discharge Date:   21    Disposition:  Home    Discharge Diagnoses: Active Hospital Problems    Diagnosis Date Noted    Benign essential hypertension [I10]      Priority: Medium    Paroxysmal atrial tachycardia (HCC) [I47.1] 2021    KEM on CPAP [G47.33, Z99.89] 2021    Chest pain [R07.9] 04/10/2021    COPD, mild ([address] [J44.9] 2016       Follow-up appointments:  one week    Outpatient to do list: F/U with PCP and Cardio    Condition at Discharge:  [facility] [place] Course:   81 yo M with history of mild COPD, obesity, KEM on CPAP, HTN came to ER with complaints of CP. Admitted as inpatient for further management. Followed by Cardio. Telemetry with paroxysmal atrial tachycardia. Started on Cardizem. Echo with:  Normal left ventricular size, wall thickness, and systolic function with   ejection fraction estimated at 60%. No regional wall motion abnormalities are noted. Normal diastolic filling pattern for age. Trivial mitral regurgitation. Aortic valve appears sclerotic but opens adequately. Inadequate tricuspid regurgitation to estimate systolic pulmonary artery   pressure. Normal right ventricular size and function. SPECT   Normal myocardial perfusion study.    Normal LV size and systolic function. Will go home with 30 day event monitor. \    Wife brought in living will. I have signed [place] DNR form for DNR-CCA. F/U with PCP and Cardio. Discussed with wife also at bedside.     Discharge Medications:   Current Discharge Medication List      START taking these medications    Details   dilTIAZem (CARDIZEM 12 HR) 60 MG extended release capsule Take 1 capsule by mouth 2 times daily  Qty: 60 capsule, Refills: 0           Current Discharge Medication List        Current Discharge Medication List      CONTINUE these medications which have NOT CHANGED    Details   traMADol (ULTRAM) 50 MG tablet Take 50 mg by mouth every 6 hours as needed for Pain. telmisartan-hydrochlorothiazide (MICARDIS HCT) 80-25 MG per tablet TAKE 1 TABLET BY MOUTH DAILY  Qty: 90 tablet, Refills: 0      furosemide (LASIX) 20 MG tablet TAKE 1 TABLET BY MOUTH DAILY FOR SWELLING  Qty: 90 tablet, Refills: 0      potassium chloride (KLOR-CON M) 10 MEQ extended release tablet TAKE 1 TABLET BY MOUTH DAILY WITH FUROSEMIDE AS NEEDED FOR SWELLING  Qty: 90 tablet, Refills: 1      vitamin D 25 MCG (1000 UT) CAPS Take 1 capsule by mouth nightly      pravastatin (PRAVACHOL) 40 MG tablet TAKE 1 TABLET BY MOUTH EVERY OTHER DAY  Qty: 45 tablet, Refills: 3      ipratropium-albuterol (DUONEB) 0.5-2.5 (3) MG/3ML SOLN nebulizer solution Take 3 mLs by nebulization every 6 hours as needed for Shortness of Breath. Qty: 360 mL, Refills: 1      latanoprost (XALATAN) 0.005 % ophthalmic solution Place 1 drop into both eyes nightly. Current Discharge Medication List            Discharge Exam:    BP (!) 149/78   Pulse 90   Temp 97.8 °F (36.6 °C) (Oral)   Resp 16   Ht 5' 4\" (1.626 m)   Wt 214 lb 3.2 oz (97.2 kg)   SpO2 97%   BMI 36.77 kg/m²   General appearance:  NAD, obese, [place], pleasant  HEENT:   Normal cephalic, atraumatic, moist mucous membranes, no oropharyngeal erythema or exudate. R hearing aid  Neck: Supple, trachea midline, no anterior cervical or SC LAD  Heart[de-identified] Normal s1/s2, RRR, no murmurs, gallops, or rubs. No leg edema  Lungs:  No use of accessory musclesNormal respiratory effort. Clear to auscultation, bilaterally without Rales/Wheezes/Rhonchi.   Abdomen: Soft, non-tender, non-distended, bowel sounds present, no masses  Musculoskeletal:  Tender to palpation in L lateral lower ribs  Skin: No lesion or masses  Neurologic:  Neurovascularly intact without any focal sensory/motor deficits. Grossly non-focal.  Psychiatric:  A & O x3  Neuro: Grossly intact, moves all four extremities     Labs: For convenience and continuity at follow-up the following most recent labs are provided:    Lab Results   Component Value Date    WBC 6.5 04/11/2021    HGB 13.6 04/11/2021    HCT 41.4 04/11/2021    MCV 93.0 04/11/2021     04/11/2021     04/11/2021    K 4.0 04/11/2021     04/11/2021    CO2 30 04/11/2021    BUN 26 04/11/2021    CREATININE 1.1 04/11/2021    CALCIUM 9.5 04/11/2021    BNP 68 01/16/2013    ALKPHOS 54 04/10/2021    ALT 15 04/10/2021    AST 17 04/10/2021    BILITOT 0.9 04/10/2021    LABALBU 4.3 04/10/2021    LDLCALC 85 01/06/2021    TRIG 98 01/06/2021     Lab Results   Component Value Date    INR 0.96 02/22/2012    INR 1.09 08/09/2011    INR 1.14 01/31/2010       Radiology:  Echo Complete    Result Date: 4/12/2021  Transthoracic Echocardiography Report (TTE)  Demographics   Patient Name       Jeanne St   Date of Study      04/12/2021         Gender              Male   Patient Number     [id number]         Date of Birth       05/14/1930   Visit Number       [id number]          Age                 80 year(s)   Accession Number   [id number]         Room Number         4838   Corporate ID       [id number]           Jayjay Servin RVT   Ordering Physician Edd Bishop, Interpreting        Henna Dave MD                 Physician           DO CAMACHO, [facility] [place]  Procedure Type of Study   TTE procedure:ECHOCARDIOGRAM COMPLETE 2D W DOPPLER W COLOR. Procedure Date Date: 04/12/2021 Start: 10:18 AM Study Location: [facility] - Echo Lab Technical Quality: Adequate visualization Indications:Chest pain.  Patient Status: Routine Height: 64 inches Weight: 214 pounds BSA: 2.01 m2 BMI: 36.73 kg/m2 BP: 148/83 mmHg  Conclusions   Summary Normal left ventricular size, wall thickness, and systolic function with  ejection fraction estimated at 60%. No regional wall motion abnormalities are noted. Normal diastolic filling pattern for age. Trivial mitral regurgitation. Aortic valve appears sclerotic but opens adequately. Inadequate tricuspid regurgitation to estimate systolic pulmonary artery  pressure. Normal right ventricular size and function. Signature   ------------------------------------------------------------------  Electronically signed by Seema Garner, [facility] [place] [facility]  (Interpreting physician) on 04/12/2021 at 12:13 PM  ------------------------------------------------------------------   Findings   Left Ventricle  Normal left ventricular size, wall thickness, and systolic function with  ejection fraction estimated at 60%. No regional wall motion abnormalities are noted. Normal diastolic filling pattern for age. Mitral Valve  Mitral valve is structurally normal.  Trivial mitral regurgitation. Left Atrium  The left atrium is normal in size. Aortic Valve  Aortic valve appears sclerotic but opens adequately. No evidence of aortic valve regurgitation. Aorta  The aortic root is normal in size. Right Ventricle  Normal right ventricular size and function. Tricuspid Valve  Tricuspid valve is structurally normal.  Trivial tricuspid regurgitation. Inadequate tricuspid regurgitation to estimate systolic pulmonary artery  pressure. Right Atrium  The right atrium is normal in size. Pulmonic Valve  The pulmonic valve is normal in structure and function. No evidence of pulmonic valve regurgitation. Pericardial Effusion  No pericardial effusion noted. Pleural Effusion  No pleural effusion noted. Miscellaneous  No obvious masses, thrombi or vegetations are noted. IVC not well visualized. IVC is normal in size (< 2.1 cm) and collapses > 50% with respiration  consistent with normal RA pressure (3 mmHg).   M-Mode/2D Measurements (cm)   LV Diastolic Dimension: 3.96 cm LV Systolic Dimension: 6.02 cm  LV Septum Diastolic: 3.96 cm  LV PW Diastolic: 1 cm           AO Root Dimension: 2.3 cm                                   LA Area: 11.4 cm2  LVOT: 2.1 cm                    LA volume/Index: 34.35 ml /17 ml/m2  Doppler Measurements   AV Peak Velocity: 166 cm/s     MV Peak E-Wave: 73.7 cm/s  AV Peak Gradient: 11.02 mmHg   MV Peak A-Wave: 78.8 cm/s  AV Mean Gradient: 4 mmHg       MV E/A Ratio: 0.94  LVOT Peak Velocity: 118 cm/s  AV Area (Continuity):2.52 cm2   Estimated RAP:3 mmHg  E' Septal Velocity: 5.44 cm/s  E' Lateral Velocity: 6.31 cm/s  E/E' ratio: 12.6   Aortic Valve   Peak Velocity: 166 cm/s     Mean Velocity: 92.5 cm/s  Peak Gradient: 11.02 mmHg   Mean Gradient: 4 mmHg  Area (continuity): 2.52 cm2  AV VTI: 32.4 cm  Aorta   Aortic Root: 2.3 cm  Ascending Aorta: 3.5 cm  LVOT Diameter: 2.1 cm      Ct Head Wo Contrast    Result Date: 4/10/2021  EXAMINATION: CT OF THE HEAD WITHOUT CONTRAST  4/10/2021 11:43 am TECHNIQUE: CT of the head was performed without the administration of intravenous contrast. Dose modulation, iterative reconstruction, and/or weight based adjustment of the mA/kV was utilized to reduce the radiation dose to as low as reasonably achievable. COMPARISON: 02/27/2009 HISTORY: ORDERING SYSTEM PROVIDED HISTORY: headache TECHNOLOGIST PROVIDED HISTORY: Reason for exam:->headache Has a \"code stroke\" or \"stroke alert\" been called? ->No Decision Support Exception->Emergency Medical Condition (MA) Reason for Exam: Chest Pain (Patient reports he is having left sided chest pains every time his heart beats. Also with c/o intermittent headaches. ) Acuity: Acute Type of Exam: Initial FINDINGS: BRAIN/VENTRICLES: There is no acute intracerebral hemorrhage or extra-axial fluid collection. There is mild cerebral atrophy with mild periventricular white matter small vessel ischemic disease.  ORBITS: Status post bilateral cataract removal. SINUSES: The visualized paranasal sinuses and mastoid air cells are clear. SOFT TISSUES/SKULL:  Status post left mastoidectomy and cochlear implant. There is partial opacification of the left mastoid air cells. 1. No acute intracranial abnormality. Xr Chest Portable    Result Date: 4/10/2021  EXAMINATION: ONE XRAY VIEW OF THE CHEST 4/10/2021 11:38 am COMPARISON: 04/17/2018 HISTORY: ORDERING SYSTEM PROVIDED HISTORY: cp TECHNOLOGIST PROVIDED HISTORY: Reason for exam:->cp Reason for Exam: Chest Pain (Patient reports he is having left sided chest pains every time his heart beats. Also with c/o intermittent headaches. ) Acuity: Acute Type of Exam: Initial FINDINGS: There is bibasilar atelectasis. No change in the elevated left hemidiaphragm. The cardiac silhouette is within normal limits. There is no pneumothorax or pleural effusion. 1.  No acute abnormality. Nm Myocardial Spect Rest Exercise Or Rx    Result Date: 4/12/2021  Cardiac Perfusion Imaging  Demographics   Patient Name       Neetu Schultz   Date of Study      04/12/2021         Gender              Male   Patient Number     [id number]         Date of Birth       05/14/1930   Visit Number       [id number]          Age                 80 year(s)   Accession Number   [id number]         Room Number         9430   Corporate ID       [id number]           NM Technician       Zully Galarza   Nurse              Kaya Marroquin,  Kristy Schrader MD,                     RN                 Physician           [facility]   Ordering Physician Lico Perkins MD   The procedure was explained in detail to the patient. Risks,  complications and alternative treatments were reviewed. Written consent  was obtained. Procedure Procedure Type:   Nuclear Stress Test:Exercise, NM MYOCARDIAL SPECT REST EXERCISE OR RX   Study location: [facility] - Nuclear Medicine   Indications: Chest pain. Hospital Status: Outpatient.   Height: 64 inches Weight: 214 pounds  Risk Factors   The patient risk factors include:obesity, physical activity, treated and  controlled hypercholesterolemia, treated and controlled hypertension,  chronic lung disease and dyslipidemia. Conclusions   Summary  Normal myocardial perfusion study. Normal LV size and systolic function. Stress Protocols   Resting ECG  Normal sinus rhythm. Normal ECG. Resting HR:85 bpm       Resting BP:148/59 mmHg   Pre-stress physical exam: Resting pulse ox 96% RA. .  Stress Protocol:Exercise  Peak HR:125 bpm                            HR/BP product:91397  Peak BP:189/63 mmHg  Predicted HR: 130 bpm  % of predicted HR: 96   Reason for termination:Physiologic Maximum   ECG Findings  Normal response to lexiscan . Arrhythmias  Occasional PVC's. Symptoms  There was stress induced shortness of breath. Symptoms resolved with aminophylline. Denies any chest pain or discomfortt. Pulse ox 94% RA. Complications  Procedure complication was none. Imaging Protocols   - One Day   Rest                          Stress   Isotope:Myoview/Tetrofosmin   Isotope: Myoview/Tetrofosmin  Isotope dose:10.11 mCi        Isotope dose:32.6 mCi  Administration Route:I.V. Administration Route:I.V.  Date:04/12/2021 08:25         Date:04/12/2021 10:15                                 Technique:      Gated  Imaging Results    Stress ejection    Ejection fraction:53 %    EDV :66 ml    ESV :31 ml    Stroke volume :35 ml    LV mass :107 gr  Medical History  Signatures   ------------------------------------------------------------------  Electronically signed by German Montano MD, [facility] [place] (Interpreting  physician) on 04/12/2021 at 12:21  ------------------------------------------------------------------        The patient was seen and examined on day of discharge and this discharge summary is in conjunction with any daily progress note from day of discharge. Time Spent on discharge is 45 minutes  in the examination, evaluation, counseling and review of medications and discharge plan. Note that more than 30 minutes was spent in preparing discharge papers, discussing discharge with patient, medication review, etc.       Signed:    Fredy Neff MD   4/12/2021      Thank you Phan Wooten MD for the opportunity to be involved in this patient's care.  If you have any questions or concerns please feel free to contact me at [address] [place] [address].

## 2021-04-12 NOTE — PROGRESS NOTES
Pt's wife provided Pt's living will and POA; placed in chart. Also, POA have a written statement that pt would like change code status to DNR (placed in chart).  MD informed

## 2021-04-12 NOTE — PROGRESS NOTES
Instructed on Lexiscan Stress Test Procedure including possible side effects/ adverse reactions. Patient verbalizes  understanding and denies having any questions . See [facility] Cardiology             Aminophylline given per lexiscan protocol in stress lab for c/o persistant nausea.

## 2021-04-12 NOTE — PROGRESS NOTES
Physician Progress Note      PATIENT:               Taco Rodriguez  CSN #:                  [id number]  :                       1930  ADMIT DATE:       4/10/2021 11:17 AM  [address] [place] DATE:        2021 3:59 PM  RESPONDING  PROVIDER #:        Adina Arechiga MD          QUERY TEXT:    Pt admitted with Chest Pain. Pt noted to have Paroxysmal atrial tachycardia. If possible, please document in progress notes and discharge summary if you   are evaluating and/or treating any of the following: The medical record reflects the following:  Risk Factors: Paroxysmal atrial tachycardia, HTN, COPD  Clinical Indicators:  Discharge summary documented \"Paroxysmal atrial   tachycardia\" and \"Chest Pain. \"  Treatment: Cards consult, stress test, Outpatient holter monitor  Options provided:  -- Chest pain due to Paroxysmal atrial tachycardia  -- Other - I will add my own diagnosis  -- Disagree - Not applicable / Not valid  -- Disagree - Clinically unable to determine / Unknown  -- Refer to Clinical Documentation Reviewer    PROVIDER RESPONSE TEXT:    This patient has chest pain due to Paroxysmal atrial tachycardia.     Query created by: Ady Martinez on 2021 3:57 PM      Electronically signed by:  Adina Arechiga MD 2021 4:49 PM

## 2021-04-12 NOTE — PROGRESS NOTES
Patient instructed on Gustavo Protocol Stress Test Procedure including possible side effects and adverse reactions. Verbalizes knowledge and understanding and denies having any questions.

## 2021-04-12 NOTE — PROGRESS NOTES
[facility]   Chest pain   [phone]      Chief Complaint   Patient presents with    Chest Pain     Patient reports he is having left sided chest pains every time his heart beats. Also with c/o intermittent headaches. History of Present Illness:  Darrell Wiseman is a 80 y.o. patient who presented to the hospital with complaints of chest discomfort. He reports that there is pinpoint tenderness next to his left breast. He states that it has been hurting since he hurt his shoulder. He reports being very active, cutting down trees in the last few weeks. No exertional chest pain or pressure. Past Medical History:   has a past medical history of Afib ([address] Anxiety, Atrial fibrillation ([address] Benign essential hypertension, Cervical back pain with evidence of disc disease, Cramps, extremity, Elevated prostate specific antigen (PSA), Emphysema, Hypercholesteremia, and Kidney stones. Surgical History:   has a past surgical history that includes Throat surgery; eye surgery; hernia repair; Colonoscopy; Appendectomy; Vasectomy; Knee arthroscopy (Bilateral); TURP (2007); Cochlear implant (2017); and Carpal tunnel release (Bilateral). Social History:   reports that he has never smoked. He has never used smokeless tobacco. He reports that he does not drink alcohol or use drugs. Home Medications:  Were reviewed and are listed in nursing record. and/or listed below  Prior to Admission medications    Medication Sig Start Date End Date Taking? Authorizing Provider   traMADol (ULTRAM) 50 MG tablet Take 50 mg by mouth every 6 hours as needed for Pain.    Yes Historical Provider, MD   telmisartan-hydrochlorothiazide (MICARDIS HCT) 80-25 MG per tablet TAKE 1 TABLET BY MOUTH DAILY 3/10/21  Yes Blanca Barfield MD   furosemide (LASIX) 20 MG tablet TAKE 1 TABLET BY MOUTH DAILY FOR SWELLING 3/1/21  Yes Guy Sheikh, APRN - CNP   potassium chloride (KLOR-CON M) 10 MEQ extended release tablet TAKE 1 TABLET BY MOUTH DAILY WITH FUROSEMIDE AS NEEDED FOR SWELLING 11/9/20  Yes Dana Sheikh, APRN - CNP   vitamin D 25 MCG (1000 UT) CAPS Take 1 capsule by mouth nightly   Yes Historical Provider, MD   pravastatin (PRAVACHOL) 40 MG tablet TAKE 1 TABLET BY MOUTH EVERY OTHER DAY 7/6/20  Yes Marta Nieto MD   ipratropium-albuterol (DUONEB) 0.5-2.5 (3) MG/3ML SOLN nebulizer solution Take 3 mLs by nebulization every 6 hours as needed for Shortness of Breath. 3/23/15  Yes Ingrid Lam MD   latanoprost (XALATAN) 0.005 % ophthalmic solution Place 1 drop into both eyes nightly.    Yes Historical Provider, MD        Current Medications:  Current Facility-Administered Medications   Medication Dose Route Frequency Provider Last Rate Last Admin    regadenoson (LEXISCAN) injection 0.4 mg  0.4 mg Intravenous ONCE PRN Cindy Perez MD        aminophylline injection 100 mg  100 mg Intravenous Once Claudette Wood MD        dilTIAZem (CARDIZEM 12 HR) extended release capsule 60 mg  60 mg Oral BID Catalina Bonds MD   60 mg at 04/11/21 2246    furosemide (LASIX) tablet 20 mg  20 mg Oral Daily Cindy Perez MD   20 mg at 04/11/21 1007    ipratropium-albuterol (DUONEB) nebulizer solution 3 mL  1 vial Nebulization Q6H PRN Deepinder Alphonso Alpers, MD        latanoprost (XALATAN) 0.005 % ophthalmic solution 1 drop  1 drop Both Eyes Nightly Cindy Perez MD        pravastatin (PRAVACHOL) tablet 20 mg  20 mg Oral Nightly Cindy Perez MD   20 mg at 04/11/21 2245    perflutren lipid microspheres (DEFINITY) injection 1.65 mg  1.5 mL Intravenous ONCE PRN Cindy Perez MD        sodium chloride flush 0.9 % injection 5-40 mL  5-40 mL Intravenous 2 times per day Cindy Perez MD   10 mL at 04/11/21 2250    sodium chloride flush 0.9 % injection 5-40 mL  5-40 mL Intravenous PRN Cindy Perez MD        0.9 % sodium chloride infusion  25 mL Intravenous PRN Cindy Perez MD        enoxaparin (LOVENOX) injection clots or swollen lymph nodes. · Allergic/Immunologic: No nasal congestion or hives.   ·     Physical Examination:    Vitals:    04/12/21 0819   BP:    Pulse:    Resp: 16   Temp:    SpO2: 96%    Weight: 214 lb 3.2 oz (97.2 kg)         General Appearance:  Alert, cooperative, no distress, appears stated age   Head:  Normocephalic, without obvious abnormality, atraumatic   Eyes:  PERRL, conjunctiva/corneas clear       Nose: Nares normal, no drainage or sinus tenderness   Throat: Lips, mucosa, and tongue normal   Neck: Supple, symmetrical, trachea midline, no adenopathy, thyroid: not enlarged, symmetric, no tenderness/mass/nodules, no carotid bruit or JVD       Lungs:   Clear to auscultation bilaterally, respirations unlabored   Chest Wall:  Chest wall tender to palpation    Heart:  Regular rate and rhythm, S1, S2 normal, no murmur, rub or gallop   Abdomen:   Soft, non-tender, bowel sounds active all four quadrants,  no masses, no organomegaly           Extremities: Extremities normal, atraumatic, no cyanosis or edema   Pulses: 2+ and symmetric   Skin: Skin color, texture, turgor normal, no rashes or lesions   Pysch: Normal mood and affect   Neurologic: Normal gross motor and sensory exam.         Labs  CBC:   Lab Results   Component Value Date    WBC 6.5 04/11/2021    RBC 4.46 04/11/2021    HGB 13.6 04/11/2021    HCT 41.4 04/11/2021    MCV 93.0 04/11/2021    RDW 13.8 04/11/2021     04/11/2021     CMP:    Lab Results   Component Value Date     04/11/2021    K 4.0 04/11/2021     04/11/2021    CO2 30 04/11/2021    BUN 26 04/11/2021    CREATININE 1.1 04/11/2021    GFRAA >60 04/11/2021    GFRAA >60 01/16/2013    AGRATIO 1.4 04/10/2021    LABGLOM >60 04/11/2021    GLUCOSE 104 04/11/2021    PROT 7.3 04/10/2021    PROT 6.9 06/06/2012    CALCIUM 9.5 04/11/2021    BILITOT 0.9 04/10/2021    ALKPHOS 54 04/10/2021    AST 17 04/10/2021    ALT 15 04/10/2021     PT/INR:  No results found for: [facility]  Lab Results

## 2021-04-12 NOTE — PLAN OF CARE
Problem: Cardiovascular  Goal: Hemodynamic stability  4/11/2021 2309 by Ana Israel RN  Outcome: Met This Shift     Problem: Respiratory  Goal: O2 Sat > 90%  4/11/2021 2309 by Ana Israel RN  Outcome: Met This Shift     Problem: Respiratory  Goal: No pulmonary complications  Outcome: Ongoing     Problem:   Goal: Adequate urinary output  Outcome: Ongoing     Problem:   Goal: No urinary complication  Outcome: Ongoing  Note: Monitoring intake/output     Problem: Falls - Risk of:  Goal: Will remain free from falls  Description: Will remain free from falls  Outcome: Ongoing     Problem: Falls - Risk of:  Goal: Absence of physical injury  Description: Absence of physical injury  Outcome: Ongoing  Note: INDEPENDENT at home. Pt is using urinal and ringing for staff to empty overnight. Nonskid socks on. Callbell in reach, siderails up x2.       Problem: Respiratory  Goal: Supplemental O2 requirements decreased  Note: Requiring no O2, on overnight pulsox at this time

## 2021-04-12 NOTE — ACP (ADVANCE CARE PLANNING)
Advanced Care Planning Note. Purpose of Encounter: Advanced care planning in light of chest pain  Parties In Attendance: Patient, wife  Decisional Capacity: Yes  Subjective: Patient with L sided chest pain  Objective: EF 60% on Echo on 21  Goals of Care Determination: Patient wants limited support (No CPR, short vent, no HD, no trach, no PEG, ok for surgery)  Plan:  SPECT, Echo and Cardio consult  Code Status: DNR CCA   Time spent on Advanced care Plannin minutes  Advanced Care Planning Documents: Completed advanced directives on chart, wife is the POA.     Thao Campos MD  2021 7:33 AM

## 2021-04-12 NOTE — PROGRESS NOTES
Assessment/vitals complete and HS meds given, see flowsheets and MAR. Urinal empted of 350ml of yellow urine. Plan of care discussed, including NPO status t midnight, pt verbalized understanding. No needs at this time, callbell in reach.   Jnaki Tinoco Rn

## 2021-04-23 ENCOUNTER — OFFICE VISIT (OUTPATIENT)
Dept: FAMILY MEDICINE CLINIC | Age: 86
End: 2021-04-23
Payer: MEDICARE

## 2021-04-23 VITALS
HEART RATE: 72 BPM | HEIGHT: 64 IN | BODY MASS INDEX: 37.39 KG/M2 | SYSTOLIC BLOOD PRESSURE: 130 MMHG | OXYGEN SATURATION: 93 % | WEIGHT: 219 LBS | DIASTOLIC BLOOD PRESSURE: 78 MMHG | RESPIRATION RATE: 18 BRPM

## 2021-04-23 DIAGNOSIS — J44.9 COPD, MILD (HCC): ICD-10-CM

## 2021-04-23 DIAGNOSIS — M79.602 CHRONIC PAIN OF LEFT UPPER EXTREMITY: ICD-10-CM

## 2021-04-23 DIAGNOSIS — G47.33 OSA ON CPAP: ICD-10-CM

## 2021-04-23 DIAGNOSIS — I10 BENIGN ESSENTIAL HYPERTENSION: ICD-10-CM

## 2021-04-23 DIAGNOSIS — I47.1 PAROXYSMAL ATRIAL TACHYCARDIA (HCC): Primary | ICD-10-CM

## 2021-04-23 DIAGNOSIS — G89.29 CHRONIC PAIN OF LEFT UPPER EXTREMITY: ICD-10-CM

## 2021-04-23 DIAGNOSIS — Z79.899 LONG-TERM USE OF HIGH-RISK MEDICATION: ICD-10-CM

## 2021-04-23 DIAGNOSIS — Z99.89 OSA ON CPAP: ICD-10-CM

## 2021-04-23 PROCEDURE — 99214 OFFICE O/P EST MOD 30 MIN: CPT | Performed by: FAMILY MEDICINE

## 2021-04-23 PROCEDURE — 3023F SPIROM DOC REV: CPT | Performed by: FAMILY MEDICINE

## 2021-04-23 PROCEDURE — 1036F TOBACCO NON-USER: CPT | Performed by: FAMILY MEDICINE

## 2021-04-23 PROCEDURE — G8427 DOCREV CUR MEDS BY ELIG CLIN: HCPCS | Performed by: FAMILY MEDICINE

## 2021-04-23 PROCEDURE — 1123F ACP DISCUSS/DSCN MKR DOCD: CPT | Performed by: FAMILY MEDICINE

## 2021-04-23 PROCEDURE — G8417 CALC BMI ABV UP PARAM F/U: HCPCS | Performed by: FAMILY MEDICINE

## 2021-04-23 PROCEDURE — 4040F PNEUMOC VAC/ADMIN/RCVD: CPT | Performed by: FAMILY MEDICINE

## 2021-04-23 PROCEDURE — 80305 DRUG TEST PRSMV DIR OPT OBS: CPT | Performed by: FAMILY MEDICINE

## 2021-04-23 PROCEDURE — G8926 SPIRO NO PERF OR DOC: HCPCS | Performed by: FAMILY MEDICINE

## 2021-04-23 PROCEDURE — 1111F DSCHRG MED/CURRENT MED MERGE: CPT | Performed by: FAMILY MEDICINE

## 2021-04-23 RX ORDER — TRAMADOL HYDROCHLORIDE 50 MG/1
50 TABLET ORAL EVERY 6 HOURS PRN
Qty: 90 TABLET | Refills: 2 | Status: SHIPPED | OUTPATIENT
Start: 2021-04-23 | End: 2021-05-23

## 2021-04-23 SDOH — ECONOMIC STABILITY: TRANSPORTATION INSECURITY
IN THE PAST 12 MONTHS, HAS LACK OF TRANSPORTATION KEPT YOU FROM MEETINGS, WORK, OR FROM GETTING THINGS NEEDED FOR DAILY LIVING?: NO

## 2021-04-23 SDOH — ECONOMIC STABILITY: FOOD INSECURITY: WITHIN THE PAST 12 MONTHS, THE FOOD YOU BOUGHT JUST DIDN'T LAST AND YOU DIDN'T HAVE MONEY TO GET MORE.: NEVER TRUE

## 2021-04-23 SDOH — ECONOMIC STABILITY: TRANSPORTATION INSECURITY
IN THE PAST 12 MONTHS, HAS THE LACK OF TRANSPORTATION KEPT YOU FROM MEDICAL APPOINTMENTS OR FROM GETTING MEDICATIONS?: NO

## 2021-04-23 SDOH — ECONOMIC STABILITY: INCOME INSECURITY: HOW HARD IS IT FOR YOU TO PAY FOR THE VERY BASICS LIKE FOOD, HOUSING, MEDICAL CARE, AND HEATING?: NOT HARD AT ALL

## 2021-04-23 ASSESSMENT — PATIENT HEALTH QUESTIONNAIRE - PHQ9
SUM OF ALL RESPONSES TO PHQ9 QUESTIONS 1 & 2: 0
SUM OF ALL RESPONSES TO PHQ QUESTIONS 1-9: 0

## 2021-04-23 NOTE — PROGRESS NOTES
 Cramps, extremity     Elevated prostate specific antigen (PSA)     history of    Emphysema     Hypercholesteremia     Kidney stones         Past Surgical History:   Procedure Laterality Date    APPENDECTOMY      CARPAL TUNNEL RELEASE Bilateral     COCHLEAR IMPLANT  2017    bilat -service related - done at [address] [facility] [place]      cataract with lens implant    HERNIA REPAIR      rt side    KNEE ARTHROSCOPY Bilateral     THROAT SURGERY      uvula resected, and nasal surgery for sleep apnea    TURP  2007    VASECTOMY       Admission on 04/10/2021, Discharged on 04/12/2021   Component Date Value Ref Range Status    Ventricular Rate 04/10/2021 82  BPM Final    Atrial Rate 04/10/2021 82  BPM Final    P-R Interval 04/10/2021 140  ms Final    QRS Duration 04/10/2021 86  ms Final    Q-T Interval 04/10/2021 378  ms Final    QTc Calculation (Bazett) 04/10/2021 441  ms Final    P Axis 04/10/2021 31  degrees Final    R Axis 04/10/2021 18  degrees Final    T Axis 04/10/2021 57  degrees Final    Diagnosis 04/10/2021 Sinus rhythm with sinus arrhythmiaBaseline artifactNonspecific ST and T wave abnormalityConfirmed by REID PARK MD ([id number]) on 4/11/2021 11:40:50 AM   Final    WBC 04/10/2021 5.9  4.0 - 11.0 K/uL Final    RBC 04/10/2021 4.66  4.20 - 5.90 M/uL Final    Hemoglobin 04/10/2021 14.3  13.5 - 17.5 g/dL Final    Hematocrit 04/10/2021 43.7  40.5 - 52.5 % Final    MCV 04/10/2021 93.8  80.0 - 100.0 fL Final    MCH 04/10/2021 30.6  26.0 - 34.0 pg Final    MCHC 04/10/2021 32.6  31.0 - 36.0 g/dL Final    RDW 04/10/2021 13.7  12.4 - 15.4 % Final    Platelets 88/75/3666 230  135 - 450 K/uL Final    MPV 04/10/2021 8.4  5.0 - 10.5 fL Final    Neutrophils % 04/10/2021 59.6  % Final    Lymphocytes % 04/10/2021 23.0  % Final    Monocytes % 04/10/2021 15.3  % Final    Eosinophils % 04/10/2021 1.3  % Final    Basophils % 04/10/2021 0.8  % Final    Neutrophils Absolute 04/10/2021 3.5 specificity for diagnosing acute HF. In patients with  renal compromise (eGFR<60) values greater than 1200pg/ml have  a diagnostic sensitivity and specificity of 89% and 72% for  acute HF.  Troponin 04/10/2021 <0.01  <0.01 ng/mL Final    Methodology by Troponin T    Sodium 04/11/2021 140  136 - 145 mmol/L Final    Potassium reflex Magnesium 04/11/2021 4.0  3.5 - 5.1 mmol/L Final    Chloride 04/11/2021 102  99 - 110 mmol/L Final    CO2 04/11/2021 30  21 - 32 mmol/L Final    Anion Gap 04/11/2021 8  3 - 16 Final    Glucose 04/11/2021 104* 70 - 99 mg/dL Final    BUN 04/11/2021 26* 7 - 20 mg/dL Final    CREATININE 04/11/2021 1.1  0.8 - 1.3 mg/dL Final    GFR Non- 04/11/2021 >60  >60 Final    Comment: >60 mL/min/1.73m2 EGFR, calc. for ages 25 and older using the  MDRD formula (not corrected for weight), is valid for stable  renal function.  GFR  04/11/2021 >60  >60 Final    Comment: Chronic Kidney Disease: less than 60 ml/min/1.73 sq.m. Kidney Failure: less than 15 ml/min/1.73 sq.m. Results valid for patients 18 years and older.       Calcium 04/11/2021 9.5  8.3 - 10.6 mg/dL Final    WBC 04/11/2021 6.5  4.0 - 11.0 K/uL Final    RBC 04/11/2021 4.46  4.20 - 5.90 M/uL Final    Hemoglobin 04/11/2021 13.6  13.5 - 17.5 g/dL Final    Hematocrit 04/11/2021 41.4  40.5 - 52.5 % Final    MCV 04/11/2021 93.0  80.0 - 100.0 fL Final    MCH 04/11/2021 30.4  26.0 - 34.0 pg Final    MCHC 04/11/2021 32.7  31.0 - 36.0 g/dL Final    RDW 04/11/2021 13.8  12.4 - 15.4 % Final    Platelets 99/54/4693 212  135 - 450 K/uL Final    MPV 04/11/2021 8.6  5.0 - 10.5 fL Final    Neutrophils % 04/11/2021 59.5  % Final    Lymphocytes % 04/11/2021 24.3  % Final    Monocytes % 04/11/2021 13.8  % Final    Eosinophils % 04/11/2021 1.5  % Final    Basophils % 04/11/2021 0.9  % Final    Neutrophils Absolute 04/11/2021 3.9  1.7 - 7.7 K/uL Final    Lymphocytes Absolute 04/11/2021 1.6 1.0 - 5.1 K/uL Final    Monocytes Absolute 04/11/2021 0.9  0.0 - 1.3 K/uL Final    Eosinophils Absolute 04/11/2021 0.1  0.0 - 0.6 K/uL Final    Basophils Absolute 04/11/2021 0.1  0.0 - 0.2 K/uL Final    TSH 04/11/2021 3.64  0.27 - 4.20 uIU/mL Final     Family History   Problem Relation Age of Onset    Stroke Father 68     Current Outpatient Medications   Medication Sig Dispense Refill    dilTIAZem (CARDIZEM 12 HR) 60 MG extended release capsule Take 1 capsule by mouth 2 times daily 60 capsule 0    traMADol (ULTRAM) 50 MG tablet Take 50 mg by mouth every 6 hours as needed for Pain.  telmisartan-hydrochlorothiazide (MICARDIS HCT) 80-25 MG per tablet TAKE 1 TABLET BY MOUTH DAILY 90 tablet 0    furosemide (LASIX) 20 MG tablet TAKE 1 TABLET BY MOUTH DAILY FOR SWELLING 90 tablet 0    potassium chloride (KLOR-CON M) 10 MEQ extended release tablet TAKE 1 TABLET BY MOUTH DAILY WITH FUROSEMIDE AS NEEDED FOR SWELLING 90 tablet 1    vitamin D 25 MCG (1000 UT) CAPS Take 1 capsule by mouth nightly      pravastatin (PRAVACHOL) 40 MG tablet TAKE 1 TABLET BY MOUTH EVERY OTHER DAY 45 tablet 3    ipratropium-albuterol (DUONEB) 0.5-2.5 (3) MG/3ML SOLN nebulizer solution Take 3 mLs by nebulization every 6 hours as needed for Shortness of Breath. 360 mL 1    latanoprost (XALATAN) 0.005 % ophthalmic solution Place 1 drop into both eyes nightly. No current facility-administered medications for this visit.       Allergies   Allergen Reactions    Codeine      headache    Timolol Maleate      Short of breath       Review of Systems    Objective:  /78 (Site: Left Upper Arm, Position: Sitting, Cuff Size: Medium Adult)   Pulse 72   Resp 18   Ht 5' 4\" (1.626 m)   Wt 219 lb (99.3 kg)   SpO2 93%   BMI 37.59 kg/m²     BP Readings from Last 3 Encounters:   04/23/21 130/78   04/12/21 (!) 149/78   03/03/20 138/84       Pulse Readings from Last 3 Encounters:   04/23/21 72   04/12/21 90   03/03/20 98       Wt Readings from Last 3 Encounters:   04/23/21 219 lb (99.3 kg)   04/11/21 214 lb 3.2 oz (97.2 kg)   03/03/20 215 lb (97.5 kg)       Physical Exam  Constitutional:       General: He is not in acute distress. Appearance: He is well-developed. Eyes:      General: No scleral icterus. Conjunctiva/sclera: Conjunctivae normal.   Cardiovascular:      Rate and Rhythm: Normal rate and regular rhythm. Heart sounds: Normal heart sounds. No murmur. No gallop. Pulmonary:      Effort: Pulmonary effort is normal. No respiratory distress. Breath sounds: Normal breath sounds. No wheezing, rhonchi or rales. Abdominal:      General: Bowel sounds are normal. There is no distension. Palpations: Abdomen is soft. Tenderness: There is no abdominal tenderness. Musculoskeletal:         General: No swelling. Skin:     Findings: No erythema or rash. Neurological:      Mental Status: He is alert. Assessment/Plan:      Diagnosis Orders   1. Paroxysmal atrial tachycardia (HCC)     2. Long-term use of high-risk medication  POCT Rapid Drug Screen   3. COPD, mild ([address]     4. KEM on CPAP     5. Benign essential hypertension     6. Chronic pain of left upper extremity     reviewed echo / d/c summary and labs from hospital  Check uds  Drug contact signed  Refill tramadol prn chronic arm pain  Cont rom exercises  Hold on lasix/ kcl 2/2 cramps and urine frequency and monitor bp  Can increase arb or ccb if bp goes up   o/w cpm  F/u cardio one month w/ monitor result  oarrs reviewed and results c/w rx'd meds  Tramadol filled 10/7 #90  F/u 3-6 months/ prn    No follow-ups on file.     Berny Gold DO  4/23/2021  10:29 AM

## 2021-04-23 NOTE — LETTER
CONTROLLED SUBSTANCE MEDICATION AGREEMENT     Patient Name: Yesi Marin  Patient YOB: 1930   I understand, that controlled substance medications may be used to help better manage my symptoms and to improve my ability to function at home, work and in social settings. However, I also understand that these medications do have risks, which have been discussed with me, including possible development of physical or psychological dependence. I understand that successful treatment requires mutual trust and honesty between me and my provider. I understand and agree that following this Medication Agreement is necessary in continuing my provider-patient relationship and the success of my treatment plan. Explanation from my Provider: Benefits and Goals of Controlled Substance Medications: There are two potential goals for your treatment: (1) decreased pain and suffering (2) improved daily life functions. There are many possible treatments for your chronic condition(s). Alternatives such as physical therapy, yoga, massage, home daily exercise, meditation, relaxation techniques, injections, chiropractic manipulations, surgery, cognitive therapy, hypnosis and many medications that are not habit-forming may be used. Use of controlled substance medications may be helpful, but they are unlikely to resolve all symptoms or restore all function. Explanation from my Provider: Risks of Controlled Substance Medications:  Opioid pain medications: These medications can lead to problems such as addiction/dependence, sedation, lightheadedness/dizziness, memory issues, falls, constipation, nausea, or vomiting. They may also impair the ability to drive or operate machinery. Additionally, these medications may lower testosterone levels, leading to loss of bone strength, stamina and sex drive.   They may cause problems with breathing, sleep apnea and reduced coughing, which is especially dangerous for patients with lung disease. Overdose or dangerous interactions with alcohol and other medications may occur, leading to death. Hyperalgesia may develop, which means patients receiving opioids for the treatment of pain may become more sensitive to certain painful stimuli, and in some cases, experience pain from ordinarily non-painful stimuli. Women between the ages of 14-53 who could become pregnant should carefully weigh the risks and benefits of opioids with their physicians, as these medications increase the risk of pregnancy complications, including miscarriage,  delivery and stillbirth. It is also possible for babies to be born addicted to opioids. Opioid dependence withdrawal symptoms may include; feelings of uneasiness, increased pain, irritability, belly pain, diarrhea, sweats and goose-flesh. Benzodiazepines and non-benzodiazepine sleep medications: These medications can lead to problems such as addiction/dependence, sedation, fatigue, lightheadedness, dizziness, incoordination, falls, depression, hallucinations, and impaired judgment, memory and concentration. The ability to drive and operate machinery may also be affected. Abnormal sleep-related behaviors have been reported, including sleepwalking, driving, making telephone calls, eating, or having sex while not fully awake. These medications can suppress breathing and worsen sleep apnea, particularly when combined with alcohol or other sedating medications, potentially leading to death. Dependence withdrawal symptoms may include tremors, anxiety, hallucinations and seizures. Stimulants:  Common adverse effects include addiction/dependence, increased blood  pressure and heart rate, decreased appetite, nausea, involuntary weight loss, insomnia,                                                                                                                     Initials:_______   irritability, and headaches.   These risks may increase when these medications are combined with other stimulants, such as caffeine pills or energy drinks, certain weight loss supplements and oral decongestants. Dependence withdrawal symptoms may include depressed mood, loss of interest, suicidal thoughts, anxiety, fatigue, appetite changes and agitation. Testosterone replacement therapy:  Potential side effects include increased risk of stroke and heart attack, blood clots, increased blood pressure, increased cholesterol, enlarged prostate, sleep apnea, irritability/aggression and other mood disorders, and decreased fertility. I agree and understand that I and my prescriber have the following rights and responsibilities regarding my treatment plan:     1. MY RIGHTS:  To be informed of my treatment and medication plan. To be an active participant in my health and wellbeing. 2. MY RESPONSIBILITY AND UNDERSTANDING FOR USE OF MEDICATIONS   I will take medications at the dose and frequency as directed. For my safety, I will not increase or change how I take my medications without the recommendation of my healthcare provider.  I will actively participate in any program recommended by my provider which may improve function, including social, physical, psychological programs.  I will not take my medications with alcohol or other drugs not prescribed to me. I understand that drinking alcohol with my medications increases the chances of side effects, including reduced breathing rate and could lead to personal injury when operating machinery.  I understand that if I have a history of substance use disorders, including alcohol or other illicit drugs, that I may be at increased risk of addiction to my medications.  I agree to notify my provider immediately if I should become pregnant so that my treatment plan can be adjusted.    I agree and understand that I shall only receive controlled substance medications from the prescriber that signed this agreement unless there is written agreement among other prescribers of controlled substances outlining the responsibility of the medications being prescribed.  I understand that the if the controlled medication is not helping to achieve goals, the dosage may be tapered and no longer prescribed. 3. MY RESPONSIBILITY FOR COMMUNICATION / PRESCRIPTION RENEWALS   I agree that all controlled substance medications that I take will be prescribed only by my provider. If another healthcare provider prescribes me medication in an emergency, I will notify my provider within seventy-two (72) hours.  I will arrange for refills at the prescribed interval ONLY during regular office hours. I will not ask for refills earlier than agreed, after-hours, on holidays or weekends. Refills may take up to 72 hours for processing and prescriptions to reach the pharmacy.  I will inform my other health care providers that I am taking these medications and of the existence of this [id number]. In the event of an emergency, I will provide the same information to the emergency department prescribers.  I will keep my provider updated on the pharmacy I am using for controlled medication prescription filling. Initials:_______  4. MY RESPONSIBILITY FOR PROTECTING MEDICATIONS   I will protect my prescriptions and medications. I understand that lost or misplaced prescriptions will not be replaced.  I will keep medications only for my own use and will not share them with others. I will keep all medications away from children.  I agree that if my medications are adjusted or discontinued, I will properly dispose of any remaining medications. I understand that I will be required to dispose of any remaining controlled medications as, directed by my prescriber, prior to being provided with any prescriptions for other controlled medications.   Medication drop box locations can be found at: [employer]    5. MY RESPONSIBILITY WITH ILLEGAL DRUGS    I will not use illegal or street drugs or another person's prescription medications not prescribed to me.  If there are identified addiction type symptoms, then referral to a program may be provided by my provider and I agree to follow through with this recommendation. 6. MY RESPONSIBILITY FOR COOPERATION WITH INVESTIGATIONS   I understand that my provider will comply with any applicable law and may discuss my use and/or possible misuse/abuse of controlled substances and alcohol, as appropriate, with any health care provider involved in my care, pharmacist, or legal authority.  I authorize my provider and pharmacy to cooperate fully with law enforcement agencies (as permitted by law) in the investigation of any possible misuse, sale, or other diversion of my controlled substances.  I agree to waive any applicable privilege or right of privacy or confidentiality with respect to these authorizations. 7. PROVIDERS RIGHT TO MONITOR FOR SAFETY: PRESCRIPTION MONITORING / DRUG TESTING   I consent to drug/toxicology screening and will submit to a drug screen upon my providers request to assure I am only taking the prescribed drugs for my safety monitoring. I understand that a drug screen is a laboratory test in which a sample of my urine, blood or saliva is checked to see what drugs I have been taking. This may entail an observed urine specimen, which means that a nurse or other health care provider may watch me provide urine, and I will cooperate if I am asked to provide an observed specimen.  I understand that my provider will check a copy of my State Prescription Monitoring Program () Report in order to safely prescribe medications.  Pill Counts: I consent to pill counts when requested.   I may be asked to bring all my prescribed controlled substance medications, in their original bottles, to all of my scheduled appointments. In addition, my provider may ask me to come to the practice at any time for a random pill count. 8. TERMINATION OF THIS AGREEMENT  For my safety, my prescriber has the right to stop prescribing controlled substance medications and may end this agreement. Initials:_______   Conditions that may result in termination of this agreement:  a. I do not show any improvement in pain, or my activity has not improved. b. I develop rapid tolerance or loss of improvement, as described in my treatment plan.  c. I develop significant side effects from the medication. d. My behavior is not consistent with the responsibilities outlined above, thereby causing safety concerns to continue prescribing controlled substance medications. e. I fail to follow the terms of this agreement. f. Other:____________________________       UNDERSTANDING THIS MEDICATION AGREEMENT:    I have read the above and have had all my questions answered. For chronic disease management, I know that my symptoms can be managed with many types of treatments. A chronic medication trial may be part of my treatment, but I must be an active participant in my care. Medication therapy is only one part of my symptom management plan. In some cases, there may be limited scientific evidence to support the chronic use of certain medications to improve symptoms and daily function. Furthermore, in certain circumstances, there may be scientific information that suggests that the use of chronic controlled substances may worsen my symptoms and increase my risk of unintentional death directly related to this medication therapy. I know that if my provider feels my risk from controlled medications is greater than my benefit, I will have my controlled substance medication(s) compassionately lowered or removed altogether.      I further agree to allow this office to contact my HIPAA contact if there are concerns about my safety and use of the controlled medications. I have agreed to use the prescribed controlled substance medications to me as instructed by my provider and as stated in this Medication Agreement. My initial on each page and my signature below shows that I have read each page and I have had the opportunity to ask questions with answers provided by my provider.     Patient Name (Printed): _____________________________________  Patient Signature:  ______________________   Date: _____________    Prescriber Name (Printed): ___________________________________  Prescriber Signature: _____________________  Date: _____________

## 2021-05-03 RX ORDER — DILTIAZEM HYDROCHLORIDE 60 MG/1
60 CAPSULE, EXTENDED RELEASE ORAL 2 TIMES DAILY
Qty: 60 CAPSULE | Refills: 3 | Status: SHIPPED | OUTPATIENT
Start: 2021-05-03 | End: 2021-06-01 | Stop reason: SDUPTHER

## 2021-05-03 NOTE — TELEPHONE ENCOUNTER
Prescription refill    Last OV:seen in the hospital    Last Refill:04/12/2021    Labs:04/11/2021    Future Appt:06/01/2021

## 2021-05-03 NOTE — TELEPHONE ENCOUNTER
Medication Refill    Medication needing refilled: dilTIAZem (CARDIZEM 12 HR) 60 MG extended release capsule    Dosage of the medication: 60 mg    How are you taking this medication (QD, BID, TID, QID, PRN): 1 cap BID    30 or 90 day supply called in: 30    When will you run out of your medication: Almost out of medication    Which Pharmacy are we sending the medication to?: [address] [address], [facility] [address]   [address], [place] [address]   Phone:  [phone]  Fax:  [phone]

## 2021-06-01 ENCOUNTER — OFFICE VISIT (OUTPATIENT)
Dept: CARDIOLOGY CLINIC | Age: 86
End: 2021-06-01
Payer: MEDICARE

## 2021-06-01 VITALS
HEART RATE: 77 BPM | HEIGHT: 64 IN | DIASTOLIC BLOOD PRESSURE: 70 MMHG | BODY MASS INDEX: 37.42 KG/M2 | WEIGHT: 219.2 LBS | OXYGEN SATURATION: 96 % | SYSTOLIC BLOOD PRESSURE: 120 MMHG

## 2021-06-01 DIAGNOSIS — E78.2 MIXED HYPERLIPIDEMIA: ICD-10-CM

## 2021-06-01 DIAGNOSIS — I47.1 PAROXYSMAL ATRIAL TACHYCARDIA (HCC): Primary | ICD-10-CM

## 2021-06-01 DIAGNOSIS — I10 BENIGN ESSENTIAL HYPERTENSION: ICD-10-CM

## 2021-06-01 DIAGNOSIS — R07.89 ATYPICAL CHEST PAIN: ICD-10-CM

## 2021-06-01 PROCEDURE — 4040F PNEUMOC VAC/ADMIN/RCVD: CPT | Performed by: INTERNAL MEDICINE

## 2021-06-01 PROCEDURE — 1036F TOBACCO NON-USER: CPT | Performed by: INTERNAL MEDICINE

## 2021-06-01 PROCEDURE — G8427 DOCREV CUR MEDS BY ELIG CLIN: HCPCS | Performed by: INTERNAL MEDICINE

## 2021-06-01 PROCEDURE — 99214 OFFICE O/P EST MOD 30 MIN: CPT | Performed by: INTERNAL MEDICINE

## 2021-06-01 PROCEDURE — G8417 CALC BMI ABV UP PARAM F/U: HCPCS | Performed by: INTERNAL MEDICINE

## 2021-06-01 PROCEDURE — 1123F ACP DISCUSS/DSCN MKR DOCD: CPT | Performed by: INTERNAL MEDICINE

## 2021-06-01 RX ORDER — TELMISARTAN AND HYDROCHLORTHIAZIDE 80; 25 MG/1; MG/1
1 TABLET ORAL DAILY
Qty: 90 TABLET | Refills: 3 | Status: SHIPPED | OUTPATIENT
Start: 2021-06-01 | End: 2021-12-01 | Stop reason: SDUPTHER

## 2021-06-01 RX ORDER — DILTIAZEM HYDROCHLORIDE 60 MG/1
60 CAPSULE, EXTENDED RELEASE ORAL 2 TIMES DAILY
Qty: 90 CAPSULE | Refills: 3 | Status: SHIPPED | OUTPATIENT
Start: 2021-06-01 | End: 2021-12-01 | Stop reason: SDUPTHER

## 2021-06-01 RX ORDER — FUROSEMIDE 20 MG/1
TABLET ORAL
Qty: 90 TABLET | Refills: 3 | Status: SHIPPED | OUTPATIENT
Start: 2021-06-01 | End: 2021-12-01 | Stop reason: SDUPTHER

## 2021-06-01 RX ORDER — POTASSIUM CHLORIDE 750 MG/1
TABLET, EXTENDED RELEASE ORAL
Qty: 90 TABLET | Refills: 1 | Status: SHIPPED | OUTPATIENT
Start: 2021-06-01 | End: 2021-12-01 | Stop reason: SDUPTHER

## 2021-06-01 RX ORDER — FUROSEMIDE 20 MG/1
TABLET ORAL
Qty: 90 TABLET | Refills: 3 | OUTPATIENT
Start: 2021-06-01

## 2021-06-01 ASSESSMENT — ENCOUNTER SYMPTOMS
ABDOMINAL PAIN: 0
BLOOD IN STOOL: 0
COUGH: 0
PHOTOPHOBIA: 0
CHEST TIGHTNESS: 0
ABDOMINAL DISTENTION: 0
SHORTNESS OF BREATH: 0
NAUSEA: 0

## 2021-06-01 NOTE — TELEPHONE ENCOUNTER
LOOKS LIKE LASIX WAS SENT TODAY BY CARDIOLOGIST  BUT POTASSIUM WAS NOT SENT WITH IT BUT WE RXED HIS POTASSIUM ON THE LAST REFILL?

## 2021-06-01 NOTE — PATIENT INSTRUCTIONS
Renal panel in 6 months    Follow up in 6 months    Continue all medications    Call for any changes    150 minutes of exercise weekly

## 2021-06-01 NOTE — PROGRESS NOTES
[address]  6/1/21  Referring: Dr. Denny Kennedy CONSULT/CHIEF COMPLAINT/HPI     Reason for visit/ Chief complaint  Follow up   Atrial tachycardia   HPI Beatriz Jaimes is a 80 y.o. seen as a follow up to recent monitor. He has a history of atrial tachycardia, hypertension, hyperlipidemia    Recently hospitalized with atypical chest pain ( pinpoint left breast tenderness). He attributes this to a previous injury to shoulder. This pain has not worsened in severity or frequency    In the interval since his last visit, Dr Lavada Opitz took him off of lasix and potassium, due to freqent urination. However he developed swelling in his ankles, and restarted lasix, but not the potassium. He has a ganglion cyst on his right wrist    Today he is here with his wife. His wife reports he has a lot of anxiety and anger, that is untreated because he does not believe in it. He does not sleep well. He is bored. He continues to have the same pain in his left upper shoulder/chest wall. He has shortness of breath palpitations or dizziness. He has no syncope. He is able to mow his own grass. He walks in the grocery store pushing a cart. Does have right hip pain when he walks. No routine exercise    Patient is compliant with medications and is tolerating them well without side effects     HISTORY/ALLERGIES/ROS     MedHx:  has a past medical history of Afib ([address] Anxiety, Atrial fibrillation ([address] Benign essential hypertension, Cervical back pain with evidence of disc disease, Cramps, extremity, Elevated prostate specific antigen (PSA), Emphysema, Hypercholesteremia, and Kidney stones. SurgHx:  has a past surgical history that includes Throat surgery; eye surgery; hernia repair; Colonoscopy; Appendectomy; Vasectomy; Knee arthroscopy (Bilateral); TURP (2007); Cochlear implant (2017); and Carpal tunnel release (Bilateral). SocHx:  reports that he has never smoked.  He has never used smokeless tobacco. He reports that he does not drink alcohol and does not use drugs. FamHx: No family history of premature coronary artery disease, sudden death, or aneurysm  Allergies: Codeine and Timolol maleate   ROS:   Review of Systems   Constitutional: Positive for fatigue. Negative for activity change, diaphoresis and fever. HENT: Negative for congestion and ear discharge. Eyes: Negative for photophobia and visual disturbance. Respiratory: Negative for cough, chest tightness and shortness of breath. Cardiovascular: Negative for chest pain and palpitations. Gastrointestinal: Negative for abdominal distention, abdominal pain, blood in stool and nausea. Endocrine: Negative for cold intolerance and polydipsia. Genitourinary: Negative for difficulty urinating and flank pain. Musculoskeletal: Positive for arthralgias and myalgias. +left shoulder pain    Skin: Negative for rash and wound. Allergic/Immunologic: Negative for environmental allergies and immunocompromised state. Neurological: Positive for dizziness and headaches. Hematological: Negative for adenopathy. Does not bruise/bleed easily. Psychiatric/Behavioral: Negative for confusion. The patient is not hyperactive. MEDICATIONS      Prior to Admission medications    Medication Sig Start Date End Date Taking?  Authorizing Provider   dilTIAZem (CARDIZEM 12 HR) 60 MG extended release capsule Take 1 capsule by mouth 2 times daily 5/3/21  Yes Khadra Alvarado DO   telmisartan-hydrochlorothiazide (MICARDIS HCT) 80-25 MG per tablet TAKE 1 TABLET BY MOUTH DAILY 3/10/21  Yes Yaron Chacon MD   furosemide (LASIX) 20 MG tablet TAKE 1 TABLET BY MOUTH DAILY FOR SWELLING 3/1/21  Yes SHANDA Rivera - CNP   vitamin D 25 MCG (1000 UT) CAPS Take 1 capsule by mouth nightly   Yes Historical Provider, MD   pravastatin (PRAVACHOL) 40 MG tablet TAKE 1 TABLET BY MOUTH EVERY OTHER DAY 7/6/20  Yes Yaron Chacon MD   ipratropium-albuterol (DUONEB) 0.5-2.5 (3) MG/3ML SOLN nebulizer solution Take 3 mLs by nebulization every 6 hours as needed for Shortness of Breath. 3/23/15  Yes Serene Guardian, MD   latanoprost (XALATAN) 0.005 % ophthalmic solution Place 1 drop into both eyes nightly. Yes Historical Provider, MD   potassium chloride (KLOR-CON M) 10 MEQ extended release tablet TAKE 1 TABLET BY MOUTH DAILY WITH FUROSEMIDE AS NEEDED FOR SWELLING  Patient not taking: Reported on 6/1/2021 11/9/20   Loreatha Head, APRN - CNP       PHYSICAL EXAM        Vitals:    06/01/21 0749   BP: 120/70   Pulse: 77   SpO2: 96%    Weight: 219 lb 3.2 oz (99.4 kg)     Gen Alert, cooperative, no distress Heart  Regular rate and rhythm, no murmur   Head Normocephalic, atraumatic, no abnormalities Abd  Soft, NT, +BS, no mass, no OM   Eyes PERRLA, conj/corn clear Ext  Ext nl, AT, no C/C, trace edema   Nose Nares normal, no drain age, Non-tender Pulse 2+ and symmetric   Throat Lips, mucosa, tongue normal Skin Color/text/turg nl, no rash/lesions   Neck S/S, TM, NT, no bruit Psych Nl mood and affect   Lung  CTA-B, unlabored, no DTP MSK Left shoulder pain with decreased range of motion    Ch wall NT, no deform       LABS and Imaging     Relevant and available CV data reviewed  Echo/MRI: 4/10/21  Normal left ventricular size, wall thickness, and systolic function with   ejection fraction estimated at 60%. No regional wall motion abnormalities are noted. Normal diastolic filling pattern for age. Trivial mitral regurgitation. Aortic valve appears sclerotic but opens adequately. Inadequate tricuspid regurgitation to estimate systolic pulmonary artery   pressure. Normal right ventricular size and function. Cath:none  Holter:  4/12/2021  Event monitor  PAT noted NSR.  Personally intepreted  EKG: sinus rhythm with sinus arrhythmia  Stress:4/12/21 normal myocardial perfusion study,normal LV size and fx   moderate Risk  moderate Complexity/Medical Decision Making  Outside records Reviewed  Labs Reviewed  Prior Imaging, ekg, cath, echo reviewed when available  Medications reviewed  Old Notes reviewed  ASSESSMENT AND PLAN     1. Atypical chest pain   - resolved  - tender to palpation of chest wall  - suspect due to shoulder injury  Plan:  - Continue above  - recommend 150 minutes of exercise weekly     2. Paroxsymal atrial tachycardia  - new problem  - on cardizem 60 mg daily  Plan:  - continue above  - call if symptoms worsen     3. Essential Hypertension  - at goal  - 120/70   - 4/11/21  k 4.0 bun 26 creat 1.1  - on micardis/hctz 80/25 mg daily  Plan:  - continue to follow   - renal panel in 6 months  - continue furosemide    4. Mixed Hyperlipidemia  - on pravachol 40 mg daily   - 1/6/2021 tc 150 hdl 45 ldl 85 tri 98  Plan  - continue above  - recheck lipid panel at next visit         Patient counseled on lifestyle modification, diet, and exercise. Follow Up:    6 months  Dr. Bijan Delgadillo:  I, Julio Montez, am scribing for and in the presence of Eamon Garcia MD.   Letha Kramer 06/01/21 8:07 AM   Physician Attestation  The scribe for and in the presence of kevan Portillo DO). The scribe Eber Lott may have prepopulated components of this note with my historical  intellectual property under my direct supervision. Any additions to this intellectual property were performed in my presence and at my direction.   Furthermore, the content and accuracy of this note have been reviewed by kevan Portillo DO).  6/1/2021 8:42 AM

## 2021-07-06 ENCOUNTER — TELEPHONE (OUTPATIENT)
Dept: FAMILY MEDICINE CLINIC | Age: 86
End: 2021-07-06

## 2021-07-06 RX ORDER — PRAVASTATIN SODIUM 40 MG
TABLET ORAL
Qty: 45 TABLET | Refills: 3 | Status: SHIPPED | OUTPATIENT
Start: 2021-07-06 | End: 2021-12-01 | Stop reason: SDUPTHER

## 2021-07-06 NOTE — TELEPHONE ENCOUNTER
----- Message from Margaux Raymundo sent at 7/6/2021  9:08 AM EDT -----  Subject: Message to Provider    QUESTIONS  Information for Provider?   patients wife Opal Alonso called and states her  Rodrick Denney has a very small cysts on his right wrist that has recently   started bothering him. He was told by a previous specialist that he seen that if it did not bother him not to worry about it, but Rodrick Denney noticed it bothering him in the past 2 days and wants to know if he can been seen by a doctor or referred to have it taken off, or what the doctor would recommend.  ---------------------------------------------------------------------------  --------------  [address]  What is the best way for the office to contact you? OK to leave message on voicemail  Preferred Call Back Phone Number? [phone]  ---------------------------------------------------------------------------  --------------  SCRIPT ANSWERS  Relationship to Patient? Other  Representative Name? wife/joey  Is the Representative on the appropriate HIPAA document in Epic?  Yes

## 2021-07-06 NOTE — TELEPHONE ENCOUNTER
CHECKED VISITS BACK TO 2020 AND NO MENTION OF THE WRIST ISSUE. WHAT WOULD YOU LIKE US TO TELL HIM?   [facility]

## 2021-07-07 ENCOUNTER — OFFICE VISIT (OUTPATIENT)
Dept: FAMILY MEDICINE CLINIC | Age: 86
End: 2021-07-07
Payer: MEDICARE

## 2021-07-07 VITALS
RESPIRATION RATE: 20 BRPM | WEIGHT: 218 LBS | SYSTOLIC BLOOD PRESSURE: 124 MMHG | BODY MASS INDEX: 37.42 KG/M2 | DIASTOLIC BLOOD PRESSURE: 70 MMHG | HEART RATE: 86 BPM

## 2021-07-07 DIAGNOSIS — M25.512 CHRONIC LEFT SHOULDER PAIN: ICD-10-CM

## 2021-07-07 DIAGNOSIS — M67.431 GANGLION CYST OF WRIST, RIGHT: Primary | ICD-10-CM

## 2021-07-07 DIAGNOSIS — G89.29 CHRONIC LEFT SHOULDER PAIN: ICD-10-CM

## 2021-07-07 DIAGNOSIS — M25.551 RIGHT HIP PAIN: ICD-10-CM

## 2021-07-07 PROCEDURE — 4040F PNEUMOC VAC/ADMIN/RCVD: CPT | Performed by: FAMILY MEDICINE

## 2021-07-07 PROCEDURE — 1123F ACP DISCUSS/DSCN MKR DOCD: CPT | Performed by: FAMILY MEDICINE

## 2021-07-07 PROCEDURE — G8427 DOCREV CUR MEDS BY ELIG CLIN: HCPCS | Performed by: FAMILY MEDICINE

## 2021-07-07 PROCEDURE — 99213 OFFICE O/P EST LOW 20 MIN: CPT | Performed by: FAMILY MEDICINE

## 2021-07-07 PROCEDURE — G8417 CALC BMI ABV UP PARAM F/U: HCPCS | Performed by: FAMILY MEDICINE

## 2021-07-07 PROCEDURE — 1036F TOBACCO NON-USER: CPT | Performed by: FAMILY MEDICINE

## 2021-07-07 NOTE — PROGRESS NOTES
Subjective:      Patient ID: Trevon Forrest is a 80 y.o. male. HPI  TAKES TRAMADOL FOR PAIN  SMALL LUMP ON RIGHT WRIST - HURTS TO BEND WRIST - THERE FOR AWHILE  WOULD LIKE TO SEE SPECIALIST TO GET RID OF IT  SOME PAIN IN LEFT SHOULDER - DOING EXERCISES  SOME PAIN RIGHT HIP - HURTS TO WALK BUT OKAY  LATERAL RIGHT PELVIS - NAPROXEN WORKS WELL -   Review of Systems    Objective:   Physical Exam  Vitals and nursing note reviewed. Constitutional:       Appearance: He is well-developed. HENT:      Head: Normocephalic and atraumatic. Eyes:      General: No scleral icterus. Conjunctiva/sclera: Conjunctivae normal.   Pulmonary:      Effort: Pulmonary effort is normal.   Musculoskeletal:      Comments: Tender right anterior iliac spine region   Fair rom left shoulder  Noninflamed, nt firm cyst radial aspect of right wrist - tender w/ wrist flexion   Skin:     General: Skin is warm and dry. Neurological:      Mental Status: He is alert and oriented to person, place, and time. Assessment / Plan:       Diagnosis Orders   1. Ganglion cyst of wrist, right  Helena Martel MD, Hand Surgery (Hand, Wrist, Upper Extremity), [facility]   2. Right hip pain     3.  Chronic left shoulder pain       voltaren gel or limited naprosyn use w/ food for inflammation o/w apap/ tramdol  Refer ortho for excision of ganglion cyst causing pain  Cont exercises for shoulder  F/u prn

## 2021-07-08 ENCOUNTER — TELEPHONE (OUTPATIENT)
Dept: ORTHOPEDIC SURGERY | Age: 86
End: 2021-07-08

## 2021-07-08 NOTE — TELEPHONE ENCOUNTER
Appointment Request     Patient requesting earlier appointment: Yes  Appointment offered to patient: 7.26 with ROMANW   Patient wanted to be seen asap he has a *Ganglion cyst patient is in a lot of pain.       Patient Ruth Reddy Number: [phone]

## 2021-07-09 ENCOUNTER — TELEPHONE (OUTPATIENT)
Dept: FAMILY MEDICINE CLINIC | Age: 86
End: 2021-07-09

## 2021-07-09 NOTE — TELEPHONE ENCOUNTER
I TALKED TO PT WIFE THE ARE INSISTING HE NEEDS TO BE SEEN SOONER BECAUSE HE IS IN PAIN I CALLED AND I WAS ABLE TO RESCHEDULE THEM FOR THE [facility] OFFICE 7/15 . ADDRESS GIVEN TO PT WIFE. Joey Benjamin

## 2021-07-09 NOTE — TELEPHONE ENCOUNTER
----- Message from Nanda Aguirre MA sent at 7/9/2021  8:53 AM EDT -----  Subject: Message to Provider    QUESTIONS  Information for Provider? Referral to Dr Ana Valle for wrist cyst and they   are booked. Pt is very upset and would like to talk with Dr Abril Cooper. ---------------------------------------------------------------------------  --------------  Deannie Sandhoff INFO  What is the best way for the office to contact you? OK to leave message on   voicemail  Preferred Call Back Phone Number? [phone]  ---------------------------------------------------------------------------  --------------  SCRIPT ANSWERS  Relationship to Patient? Third Party  Representative Name?  Wife- Ja Bolden

## 2021-07-09 NOTE — TELEPHONE ENCOUNTER
1 week appointment is good.   In meantime, patient can try wrapping wrist snugly w/ ace wrap or trying a brace to limit painful wrist flexion

## 2021-07-16 ENCOUNTER — OFFICE VISIT (OUTPATIENT)
Dept: ORTHOPEDIC SURGERY | Age: 86
End: 2021-07-16
Payer: MEDICARE

## 2021-07-16 VITALS — RESPIRATION RATE: 16 BRPM

## 2021-07-16 DIAGNOSIS — M65.4 DE QUERVAIN'S TENOSYNOVITIS: Primary | ICD-10-CM

## 2021-07-16 PROCEDURE — 1123F ACP DISCUSS/DSCN MKR DOCD: CPT | Performed by: PHYSICIAN ASSISTANT

## 2021-07-16 PROCEDURE — 99203 OFFICE O/P NEW LOW 30 MIN: CPT | Performed by: PHYSICIAN ASSISTANT

## 2021-07-16 PROCEDURE — G8417 CALC BMI ABV UP PARAM F/U: HCPCS | Performed by: PHYSICIAN ASSISTANT

## 2021-07-16 PROCEDURE — L3908 WHO COCK-UP NONMOLDE PRE OTS: HCPCS | Performed by: PHYSICIAN ASSISTANT

## 2021-07-16 PROCEDURE — G8427 DOCREV CUR MEDS BY ELIG CLIN: HCPCS | Performed by: PHYSICIAN ASSISTANT

## 2021-07-16 PROCEDURE — 4040F PNEUMOC VAC/ADMIN/RCVD: CPT | Performed by: PHYSICIAN ASSISTANT

## 2021-07-16 PROCEDURE — 1036F TOBACCO NON-USER: CPT | Performed by: PHYSICIAN ASSISTANT

## 2021-07-16 NOTE — Clinical Note
Dear  Justin Stringer MD,    Thank you very much for your referral or Mr. Robin Hewitt to me for evaluation and treatment of his Hand & Wrist condition. I appreciate your confidence in me and thank you for allowing me the opportunity to care for your patients. If I can be of any further assistance to you on this or any other patient, please do not hesitate to contact me. Sincerely,    Rose Mary Lundberg.  Tess Thacker MD

## 2021-07-16 NOTE — PATIENT INSTRUCTIONS
Instructions for Splint Use  DeQuervain's Tendonitis      1. Wear brace for ANY activity which is known to cause you pain. .    2.  You may wear brace during daytime hours as needed for specific activities which cause symptoms    3. You may wear the brace at night for sleep if you have pain during the night. 4.  Please do not wear brace all of the time as this will only make your wrist stiff and more uncomfortable. 5.  Brace does not need to be worn tightly, only secure enough to keep it from slipping or coming out of position. Brace may be Hand Washed Only. Prior to doing so, please remove the metal insert from the small pocket. Make sure brace is completely dry prior to wearing.

## 2021-07-16 NOTE — PROGRESS NOTES
Mr. Bob Hand is a 80 y.o. right handed man  who is seen today in Hand Surgical Consultation at the request of Lobito Arboleda MD.    He presents today regarding right wrist symptoms which have been present for approximately 2 months. A history of antecedent trauma or injury is Absent. He reports symptoms to include moderate pain along the  right  Radial and Dorsal wrist and distal forearm. Wrist symptoms are worse with certain twisting or gripping activities. Symptoms are Frequently worse with use. He reports moderate pain with thumb extension or pinch. Symptoms show no change over time. Previous treatment has included splinting of the right wrist.  He does not claim relation of his symptoms to his required work activities. He has not undergone any form of testing. I have today reviewed with Bob Hand the clinically relevant, past medical history, medications, allergies,  family history, social history, and Review Of Systems & I have documented any details relevant to today's presenting complaints in my history above. Mr. Lj Arellano self-reported past medical history, medications, allergies,  family history, social history, and Review Of Systems have been scanned into the chart under the \"Media\" tab. Physical Exam:  Mr. Lj Hewitt's most recent vitals:  Vitals  Resp: 16    He is well nourished, oriented to person, place & time. He demonstrates appropriate mood and affect as well as normal gait and station. Skin: Normal in appearance, Normal Color and Normal Texture Bilaterally   Digital range of motion is limited by pain on the Right, normal on the Left. Pain accompanies the flexion and extension of the right Thumb. There is not triggering associated with active thumb motion.   There is a palpable mass overlying the site of maximal tenderness right radial wrist.  Wrist range of motion is limited by pain and is accompanied by mild pain in dorsiflexion greater than palmar flexion. There is no evidence of gross joint instability bilaterally. Sensation is normal in the Median, Ulnar and Radial Sensory distribution bilaterally  Vascular examination reveals normal, good capillary refill and good color bilaterally   Swelling is mild along the radial margin of the wrist on the Right, normal on the Left  Muscular strength is clinically appropriate bilaterally. Examination of the right first dorsal extensor compartment of the wrist demonstrates moderate thickening and fullness. There is mild tenderness to palpation over the extensor tendons. There is mild pain with resisted thumb extension, and Finklestein's maneuver is positive right side. Wrist range of motion is accompanied by mild pain in dorsiflexion greater than palmar flexion. There is a 5 mm Firm mass on the  Radial and Dorsal aspect of the wrist nearest to the radial artery. The mass is not tender to palpation, unchanged in maximal digital flexion/extension. Impression:  Mr. Harmony Parra is showing clinical evidence of DeQuervain's Tendonitis and presents requesting further treatment. Plan:  I have had a thorough discussion with Mr. Harmony Parra regarding the treatment options available for his initially presenting right  DeQuervain's Tenosynovitis, which is causing him significant symptoms and difficulty. I have outlined for Mr. Robin Hewitt the risk, benefits and consequences of the various treatment modalities, including a reasonable expectation for the long term success of each. We have discussed the likelihood that further, more aggressive treatment may be required for his current presenting condition.   Based upon our current discussion and a reasonable understating of the options available to him, Mr. Harmony Parra has selected to proceed with a conservative plan of treatment consisting of: the use of protective splints, activity modification, and the judicious use of over-the-counter anti-inflammatory

## 2021-08-17 ENCOUNTER — OFFICE VISIT (OUTPATIENT)
Dept: PULMONOLOGY | Age: 86
End: 2021-08-17
Payer: MEDICARE

## 2021-08-17 VITALS — OXYGEN SATURATION: 96 % | HEART RATE: 84 BPM

## 2021-08-17 DIAGNOSIS — Z99.89 OSA ON CPAP: ICD-10-CM

## 2021-08-17 DIAGNOSIS — J98.6 DIAPHRAGM PARALYSIS: ICD-10-CM

## 2021-08-17 DIAGNOSIS — R06.02 SOB (SHORTNESS OF BREATH): Primary | ICD-10-CM

## 2021-08-17 DIAGNOSIS — G47.33 OSA ON CPAP: ICD-10-CM

## 2021-08-17 DIAGNOSIS — J44.9 COPD, MILD (HCC): ICD-10-CM

## 2021-08-17 PROCEDURE — 3023F SPIROM DOC REV: CPT | Performed by: INTERNAL MEDICINE

## 2021-08-17 PROCEDURE — 99204 OFFICE O/P NEW MOD 45 MIN: CPT | Performed by: INTERNAL MEDICINE

## 2021-08-17 PROCEDURE — 1123F ACP DISCUSS/DSCN MKR DOCD: CPT | Performed by: INTERNAL MEDICINE

## 2021-08-17 PROCEDURE — G8926 SPIRO NO PERF OR DOC: HCPCS | Performed by: INTERNAL MEDICINE

## 2021-08-17 PROCEDURE — G8417 CALC BMI ABV UP PARAM F/U: HCPCS | Performed by: INTERNAL MEDICINE

## 2021-08-17 PROCEDURE — 4040F PNEUMOC VAC/ADMIN/RCVD: CPT | Performed by: INTERNAL MEDICINE

## 2021-08-17 PROCEDURE — 1036F TOBACCO NON-USER: CPT | Performed by: INTERNAL MEDICINE

## 2021-08-17 PROCEDURE — G8427 DOCREV CUR MEDS BY ELIG CLIN: HCPCS | Performed by: INTERNAL MEDICINE

## 2021-08-17 RX ORDER — NAPROXEN 500 MG/1
500 TABLET ORAL 2 TIMES DAILY WITH MEALS
COMMUNITY

## 2021-08-17 RX ORDER — IPRATROPIUM BROMIDE AND ALBUTEROL SULFATE 2.5; .5 MG/3ML; MG/3ML
1 SOLUTION RESPIRATORY (INHALATION) EVERY 4 HOURS
Qty: 360 ML | Refills: 11 | Status: SHIPPED | OUTPATIENT
Start: 2021-08-17 | End: 2022-09-21

## 2021-08-17 RX ORDER — PROBENECID 500 MG/1
500 TABLET, FILM COATED ORAL 2 TIMES DAILY PRN
COMMUNITY

## 2021-08-17 RX ORDER — TRAMADOL HYDROCHLORIDE 50 MG/1
50 TABLET ORAL EVERY 6 HOURS PRN
COMMUNITY
End: 2022-02-09 | Stop reason: SDUPTHER

## 2021-08-17 NOTE — PROGRESS NOTES
[facility]  Consult Note  Ronny Brown MD       Norris Hewitt   YOB: 1930    Date of Visit:  8/17/2021    Assessment/Plan:  1. SOB (shortness of breath)  Multifactorial  · Obesity  · Deconditioning  · COPD  · Diaphragm paralysis    2. COPD, mild ([address]  I reviewed pulmonary function testing from 2011 which reveals mild obstructive defect  He has done well with intermittent DuoNeb  His nebulizer is broken beyond repair and needs to be replaced  Give him a refill on his DuoNeb  Give him a refill on his albuterol inhaler    3. Diaphragm paralysis  I reviewed chest imaging from 4/21  This continues to reveal evidence of markedly elevated left hemidiaphragm. This is also likely paralyzed. 4. KEM/chronic hypoxemic respiratory failure  Patient did have UPPP  Likely now again has clinically significant KEM  We will do an exercise oximetry in the office  · Exercise oximetry in the office reveals that he desaturates quickly to 87% while ambulating on room air  · He would benefit from supplemental oxygen with exertion and sleep  · He is mobile within his home and would benefit from a portable oxygen concentrator    We will also do an overnight oximetry on room air  I think he would benefit from oxygen  May also benefit from going back on CPAP        Chief Complaint   Patient presents with    Shortness of Breath     last seen 2016 use to be on O2 but it has been over 5 years ago       HPI  Patient presents today with a chief complaint of shortness of breath. He notices this mostly at night. He wakes from sleep sometimes after about an hour having trouble breathing. He then takes a nebulizer treatment. More recently he has noticed that he has had to double up on the medication and the nebulizer to feel benefit. He also has used some albuterol inhaler with benefit.   The patient does have a history of obstructive sleep apnea in the past.  He was on CPAP at one time but stopped wearing it after he had a uvuloplasty. The patient was also on oxygen at one time but he quit wearing this when the oxygen machine stopped working. He does have a history of mild COPD. His last breathing test was in 2011. He has been using a nebulizer which is about 21years old. He can no longer get parts to repair it. 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 He used to work at [employer]. Review of Systems  Review of Systems    History  I have reviewed past medical, surgical, social and family history. This is documented elsewhere in themedical record. Physical Exam:  Physical Exam    Allergies   Allergen Reactions    Codeine      headache    Timolol Maleate      Short of breath     Prior to Visit Medications    Medication Sig Taking? Authorizing Provider   traMADol (ULTRAM) 50 MG tablet Take 50 mg by mouth every 6 hours as needed for Pain.  Yes Historical Provider, MD   NONFORMULARY Take 80 mg by mouth as needed Uric Yes Historical Provider, MD   probenecid (BENEMID) 500 MG tablet Take 500 mg by mouth 2 times daily as needed Yes Historical Provider, MD   naproxen (NAPROSYN) 500 MG tablet Take 500 mg by mouth 2 times daily (with meals) Yes Historical Provider, MD   pravastatin (PRAVACHOL) 40 MG tablet TAKE 1 TABLET BY MOUTH EVERY OTHER DAY Yes Violeta Goldman MD   dilTIAZem (CARDIZEM 12 HR) 60 MG extended release capsule Take 1 capsule by mouth 2 times daily Yes Pablo Stevens DO   telmisartan-hydrochlorothiazide (MICARDIS HCT) 80-25 MG per tablet Take 1 tablet by mouth daily Yes Pablo Stevens DO   furosemide (LASIX) 20 MG tablet Daily Yes Pablo Stevens DO   potassium chloride (KLOR-CON M) 10 MEQ extended release tablet TAKE 1 TABLET BY MOUTH DAILY WITH FUROSEMIDE AS NEEDED FOR SWELLING Yes Violeta Goldman MD   vitamin D 25 MCG (1000 UT) CAPS Take 1 capsule by mouth nightly Yes Historical Provider, MD   latanoprost (XALATAN) 0.005 % ophthalmic solution

## 2021-09-29 ENCOUNTER — TELEPHONE (OUTPATIENT)
Dept: FAMILY MEDICINE CLINIC | Age: 86
End: 2021-09-29

## 2021-09-29 NOTE — TELEPHONE ENCOUNTER
Needs to restrict salt/ sodium in foods and NO added salt. Okay to drink water in moderate amount. Elevate legs when at rest, try to move around home frequently or pump calf muscles to help blood return from legs to heart. Finally get compression stockings or wrap legs from base of toes to below knees snugly w/ compression (ACE) wrap when first gets up and keep on until evening. All of these should reduce swelling over next several days - can continue water pill for now if you think it is helping at all, otherwise, okay to stop. If any breathing problems, let me know right away or get to ER.   If not getting better w/ above by next week, please make appointment

## 2021-11-08 ENCOUNTER — TELEPHONE (OUTPATIENT)
Dept: CARDIOLOGY CLINIC | Age: 86
End: 2021-11-08

## 2021-11-09 DIAGNOSIS — R06.02 SHORTNESS OF BREATH: ICD-10-CM

## 2021-11-09 DIAGNOSIS — E78.2 MIXED HYPERLIPIDEMIA: ICD-10-CM

## 2021-11-09 DIAGNOSIS — I10 BENIGN ESSENTIAL HYPERTENSION: Primary | ICD-10-CM

## 2021-11-15 ENCOUNTER — TELEPHONE (OUTPATIENT)
Dept: CARDIOLOGY CLINIC | Age: 86
End: 2021-11-15

## 2021-11-15 ENCOUNTER — HOSPITAL ENCOUNTER (OUTPATIENT)
Age: 86
Discharge: HOME | End: 2021-11-15
Payer: MEDICARE

## 2021-11-15 DIAGNOSIS — R06.02 SHORTNESS OF BREATH: ICD-10-CM

## 2021-11-15 LAB
A/G RATIO: 1.5 (ref 1.1–2.2)
ALBUMIN SERPL-MCNC: 4.3 G/DL (ref 3.4–5)
ALP BLD-CCNC: 54 U/L (ref 40–129)
ALT SERPL-CCNC: 14 U/L (ref 10–40)
ANION GAP SERPL CALCULATED.3IONS-SCNC: 14 MMOL/L (ref 3–16)
AST SERPL-CCNC: 13 U/L (ref 15–37)
BASOPHILS ABSOLUTE: 0.1 K/UL (ref 0–0.2)
BASOPHILS RELATIVE PERCENT: 1 %
BILIRUB SERPL-MCNC: 0.7 MG/DL (ref 0–1)
BUN BLDV-MCNC: 25 MG/DL (ref 7–20)
CALCIUM SERPL-MCNC: 9.4 MG/DL (ref 8.3–10.6)
CHLORIDE BLD-SCNC: 102 MMOL/L (ref 99–110)
CHOLESTEROL, TOTAL: 149 MG/DL (ref 0–199)
CO2: 27 MMOL/L (ref 21–32)
CREAT SERPL-MCNC: 1 MG/DL (ref 0.8–1.3)
EOSINOPHILS ABSOLUTE: 0.2 K/UL (ref 0–0.6)
EOSINOPHILS RELATIVE PERCENT: 3.1 %
GFR AFRICAN AMERICAN: >60
GFR NON-AFRICAN AMERICAN: >60
GLUCOSE BLD-MCNC: 102 MG/DL (ref 70–99)
HCT VFR BLD CALC: 41.7 % (ref 40.5–52.5)
HDLC SERPL-MCNC: 48 MG/DL (ref 40–60)
HEMOGLOBIN: 13.6 G/DL (ref 13.5–17.5)
LDL CHOLESTEROL CALCULATED: 82 MG/DL
LYMPHOCYTES ABSOLUTE: 1.3 K/UL (ref 1–5.1)
LYMPHOCYTES RELATIVE PERCENT: 25.1 %
MCH RBC QN AUTO: 30.8 PG (ref 26–34)
MCHC RBC AUTO-ENTMCNC: 32.7 G/DL (ref 31–36)
MCV RBC AUTO: 94.4 FL (ref 80–100)
MONOCYTES ABSOLUTE: 0.7 K/UL (ref 0–1.3)
MONOCYTES RELATIVE PERCENT: 14.2 %
NEUTROPHILS ABSOLUTE: 3 K/UL (ref 1.7–7.7)
NEUTROPHILS RELATIVE PERCENT: 56.6 %
PDW BLD-RTO: 14.1 % (ref 12.4–15.4)
PLATELET # BLD: 200 K/UL (ref 135–450)
PMV BLD AUTO: 8.7 FL (ref 5–10.5)
POTASSIUM SERPL-SCNC: 4.3 MMOL/L (ref 3.5–5.1)
RBC # BLD: 4.42 M/UL (ref 4.2–5.9)
SODIUM BLD-SCNC: 143 MMOL/L (ref 136–145)
TOTAL PROTEIN: 7.1 G/DL (ref 6.4–8.2)
TRIGL SERPL-MCNC: 93 MG/DL (ref 0–150)
VLDLC SERPL CALC-MCNC: 19 MG/DL
WBC # BLD: 5.3 K/UL (ref 4–11)

## 2021-11-15 PROCEDURE — 36415 COLL VENOUS BLD VENIPUNCTURE: CPT

## 2021-11-15 PROCEDURE — 80061 LIPID PANEL: CPT

## 2021-11-15 PROCEDURE — 80053 COMPREHEN METABOLIC PANEL: CPT

## 2021-11-15 PROCEDURE — 85025 COMPLETE CBC W/AUTO DIFF WBC: CPT

## 2021-11-15 NOTE — TELEPHONE ENCOUNTER
----- Message from Sujit Read RN sent at 11/15/2021  5:06 PM EST -----  Reviewed all labs, dkw said no change in medications will discuss at office visit

## 2021-11-15 NOTE — TELEPHONE ENCOUNTER
I spoke with pt wife and relayed lab results per DKW. Pt wife verbalized understanding and agreed to relay to pt.

## 2021-11-17 ENCOUNTER — OFFICE VISIT (OUTPATIENT)
Dept: PULMONOLOGY | Age: 86
End: 2021-11-17
Payer: MEDICARE

## 2021-11-17 VITALS — HEART RATE: 78 BPM | OXYGEN SATURATION: 93 %

## 2021-11-17 DIAGNOSIS — G47.33 OSA ON CPAP: ICD-10-CM

## 2021-11-17 DIAGNOSIS — J96.11 CHRONIC HYPOXEMIC RESPIRATORY FAILURE (HCC): ICD-10-CM

## 2021-11-17 DIAGNOSIS — Z99.89 OSA ON CPAP: ICD-10-CM

## 2021-11-17 DIAGNOSIS — J98.6 DIAPHRAGM PARALYSIS: ICD-10-CM

## 2021-11-17 DIAGNOSIS — R06.02 SOB (SHORTNESS OF BREATH): Primary | ICD-10-CM

## 2021-11-17 DIAGNOSIS — J44.9 COPD, MILD (HCC): ICD-10-CM

## 2021-11-17 PROCEDURE — 1123F ACP DISCUSS/DSCN MKR DOCD: CPT | Performed by: INTERNAL MEDICINE

## 2021-11-17 PROCEDURE — 4040F PNEUMOC VAC/ADMIN/RCVD: CPT | Performed by: INTERNAL MEDICINE

## 2021-11-17 PROCEDURE — G8926 SPIRO NO PERF OR DOC: HCPCS | Performed by: INTERNAL MEDICINE

## 2021-11-17 PROCEDURE — G8427 DOCREV CUR MEDS BY ELIG CLIN: HCPCS | Performed by: INTERNAL MEDICINE

## 2021-11-17 PROCEDURE — G8484 FLU IMMUNIZE NO ADMIN: HCPCS | Performed by: INTERNAL MEDICINE

## 2021-11-17 PROCEDURE — 99214 OFFICE O/P EST MOD 30 MIN: CPT | Performed by: INTERNAL MEDICINE

## 2021-11-17 PROCEDURE — 3023F SPIROM DOC REV: CPT | Performed by: INTERNAL MEDICINE

## 2021-11-17 PROCEDURE — 1036F TOBACCO NON-USER: CPT | Performed by: INTERNAL MEDICINE

## 2021-11-17 PROCEDURE — G8417 CALC BMI ABV UP PARAM F/U: HCPCS | Performed by: INTERNAL MEDICINE

## 2021-11-17 NOTE — PROGRESS NOTES
[facility]  Consult Note  MD Vicente Bagleynande Dennis Hewitt   YOB: 1930    Date of Visit:  11/17/2021    Assessment/Plan:  1. SOB (shortness of breath)  Multifactorial  · Obesity  · Deconditioning  · COPD  · Diaphragm paralysis    2. COPD, mild ([address]  I reviewed pulmonary function testing from 2011 which reveals mild obstructive defect     Medication Management:  The patient benefits from the medical regimen and reports no complications. He has done well with intermittent DuoNeb  Give him a refill on his DuoNeb  Give him a refill on his albuterol inhaler    3. Diaphragm paralysis  I reviewed chest imaging from 4/21  This continues to reveal evidence of markedly elevated left hemidiaphragm. This is also likely paralyzed. 4. KEM/chronic hypoxemic respiratory failure  Patient did have UPPP  Likely now again has clinically significant KEM    · Exercise oximetry in the office reveals that he desaturates quickly to 87% while ambulating on room air   · He would benefit from supplemental oxygen with exertion and sleep  · He is mobile within his home and would benefit from a portable oxygen concentrator    He never did get the overnight oximetry. He does not wear O2 during the day but does wear it with sleep at night. He is still wakes with HA  Recommend PSG. F/U after sleep study and titration.       Chief Complaint   Patient presents with    Shortness of Breath     wearing O2 at night but during the day when he is busy he does not use it but will put it on while watching tv Does monitor with activity his sat's are still > 90%     Discuss Medications     has not used nebulizer at all this week and only 3x's last week If he wakes up at night with trouble breathing will use it and go back to sleep His vials are 1years old and he read they can be good for 5 years       HPI  The patient presents with a chief complaint of moderate shortness of breath related to mild COPD of many years duration. He has mild associated cough. Exertion is a modifying factor. He also has mild COPD and he uses HHN. He is waking from sleep with HA despite using O2 with sleep. Review of Systems  No Chest pain, Nausea or vomiting reported    History  I have reviewed past medical, surgical, social and family history. This is documented elsewhere in themedical record. Physical Exam:  Well developed, well nourished  Alert and oriented  Sclera is clear  No cervical adenopathy  No JVD. Chest examination is clear. Cardiac examination reveals regular rate and rhythm without murmur, gallop or rub. The abdomen is soft, nontender and nondistended. There is no clubbing, cyanosis or edema of the extremities. There is no obvious skin rash. No focal neuro deficicts  Normal mood and affect      Allergies   Allergen Reactions    Codeine      headache    Timolol Maleate      Short of breath     Prior to Visit Medications    Medication Sig Taking? Authorizing Provider   traMADol (ULTRAM) 50 MG tablet Take 50 mg by mouth every 6 hours as needed for Pain.   Historical Provider, MD   NONFORMULARY Take 80 mg by mouth as needed Uric  Historical Provider, MD   probenecid (BENEMID) 500 MG tablet Take 500 mg by mouth 2 times daily as needed  Historical Provider, MD   naproxen (NAPROSYN) 500 MG tablet Take 500 mg by mouth 2 times daily (with meals)  Historical Provider, MD   ipratropium-albuterol (DUONEB) 0.5-2.5 (3) MG/3ML SOLN nebulizer solution Inhale 3 mLs into the lungs every 4 hours DX:COPD J44.9  Bobbi Montejo MD   PROAIR  (22 Base) MCG/ACT inhaler Inhale 2 puffs into the lungs every 6 hours as needed for Wheezing  Bobbi Montejo MD   pravastatin (PRAVACHOL) 40 MG tablet TAKE 1 TABLET BY MOUTH EVERY OTHER DAY  Calvin Perez MD   dilTIAZem (CARDIZEM 12 HR) 60 MG extended release capsule Take 1 capsule by mouth 2 times daily  Abbi Elizondo DO   telmisartan-hydrochlorothiazide (MICARDIS HCT) 80-25 MG per tablet Take 1 tablet by mouth daily  Honorio Rodriguez DO   furosemide (LASIX) 20 MG tablet Daily  Honorio Rodriguez DO   potassium chloride (KLOR-CON M) 10 MEQ extended release tablet TAKE 1 TABLET BY MOUTH DAILY WITH FUROSEMIDE AS NEEDED FOR SWELLING  Joann Garcia MD   vitamin D 25 MCG (1000 UT) CAPS Take 1 capsule by mouth nightly  Historical Provider, MD   ipratropium-albuterol (DUONEB) 0.5-2.5 (3) MG/3ML SOLN nebulizer solution Take 3 mLs by nebulization every 6 hours as needed for Shortness of Breath. Nikolas Butler MD   latanoprost (XALATAN) 0.005 % ophthalmic solution Place 1 drop into both eyes nightly. Historical Provider, MD       Vitals:    11/17/21 1444   Pulse: 78   SpO2: 93%     There is no height or weight on file to calculate BMI.      Wt Readings from Last 3 Encounters:   07/07/21 218 lb (98.9 kg)   06/01/21 219 lb 3.2 oz (99.4 kg)   04/23/21 219 lb (99.3 kg)     BP Readings from Last 3 Encounters:   07/07/21 124/70   06/01/21 120/70   04/23/21 130/78        Social History     Tobacco Use   Smoking Status Never Smoker   Smokeless Tobacco Never Used

## 2021-12-01 ENCOUNTER — OFFICE VISIT (OUTPATIENT)
Dept: CARDIOLOGY CLINIC | Age: 86
End: 2021-12-01
Payer: MEDICARE

## 2021-12-01 VITALS
HEIGHT: 64 IN | WEIGHT: 219.5 LBS | OXYGEN SATURATION: 98 % | BODY MASS INDEX: 37.47 KG/M2 | DIASTOLIC BLOOD PRESSURE: 70 MMHG | HEART RATE: 91 BPM | SYSTOLIC BLOOD PRESSURE: 132 MMHG

## 2021-12-01 DIAGNOSIS — I10 ESSENTIAL HYPERTENSION: ICD-10-CM

## 2021-12-01 DIAGNOSIS — I50.32 CHRONIC HEART FAILURE WITH PRESERVED EJECTION FRACTION (HCC): Primary | ICD-10-CM

## 2021-12-01 DIAGNOSIS — I47.1 PAROXYSMAL ATRIAL TACHYCARDIA (HCC): ICD-10-CM

## 2021-12-01 DIAGNOSIS — E78.2 MIXED HYPERLIPIDEMIA: ICD-10-CM

## 2021-12-01 PROCEDURE — 99214 OFFICE O/P EST MOD 30 MIN: CPT | Performed by: INTERNAL MEDICINE

## 2021-12-01 PROCEDURE — G8484 FLU IMMUNIZE NO ADMIN: HCPCS | Performed by: INTERNAL MEDICINE

## 2021-12-01 PROCEDURE — G8417 CALC BMI ABV UP PARAM F/U: HCPCS | Performed by: INTERNAL MEDICINE

## 2021-12-01 PROCEDURE — 1123F ACP DISCUSS/DSCN MKR DOCD: CPT | Performed by: INTERNAL MEDICINE

## 2021-12-01 PROCEDURE — 4040F PNEUMOC VAC/ADMIN/RCVD: CPT | Performed by: INTERNAL MEDICINE

## 2021-12-01 PROCEDURE — G8427 DOCREV CUR MEDS BY ELIG CLIN: HCPCS | Performed by: INTERNAL MEDICINE

## 2021-12-01 PROCEDURE — 1036F TOBACCO NON-USER: CPT | Performed by: INTERNAL MEDICINE

## 2021-12-01 RX ORDER — FUROSEMIDE 20 MG/1
20 TABLET ORAL DAILY
Qty: 90 TABLET | Refills: 3 | Status: SHIPPED | OUTPATIENT
Start: 2021-12-01

## 2021-12-01 RX ORDER — TELMISARTAN AND HYDROCHLORTHIAZIDE 80; 25 MG/1; MG/1
1 TABLET ORAL DAILY
Qty: 90 TABLET | Refills: 3 | Status: SHIPPED | OUTPATIENT
Start: 2021-12-01

## 2021-12-01 RX ORDER — PRAVASTATIN SODIUM 40 MG
40 TABLET ORAL SEE ADMIN INSTRUCTIONS
Qty: 45 TABLET | Refills: 3 | Status: SHIPPED | OUTPATIENT
Start: 2021-12-01 | End: 2022-07-05

## 2021-12-01 RX ORDER — FUROSEMIDE 20 MG/1
TABLET ORAL
Qty: 90 TABLET | Refills: 3 | Status: SHIPPED | OUTPATIENT
Start: 2021-12-01 | End: 2021-12-01

## 2021-12-01 RX ORDER — POTASSIUM CHLORIDE 750 MG/1
10 TABLET, EXTENDED RELEASE ORAL DAILY
Qty: 90 TABLET | Refills: 3 | Status: SHIPPED | OUTPATIENT
Start: 2021-12-01

## 2021-12-01 RX ORDER — DILTIAZEM HYDROCHLORIDE 60 MG/1
60 CAPSULE, EXTENDED RELEASE ORAL 2 TIMES DAILY
Qty: 90 CAPSULE | Refills: 3 | Status: SHIPPED | OUTPATIENT
Start: 2021-12-01 | End: 2022-06-07

## 2021-12-01 ASSESSMENT — ENCOUNTER SYMPTOMS
PHOTOPHOBIA: 0
COUGH: 0
BLOOD IN STOOL: 0
ABDOMINAL DISTENTION: 0
CHEST TIGHTNESS: 0
ABDOMINAL PAIN: 0
NAUSEA: 0

## 2021-12-01 NOTE — PROGRESS NOTES
[address]  12/1/21  Referring: Dr. Ibeth Cardenas CONSULT/CHIEF COMPLAINT/HPI     Reason for visit/ Chief complaint  Follow up   Atrial tachycardia   HPI Kellie Morales is a 80 y.o. seen as a follow up to recent monitor. He has a history of atrial tachycardia, hypertension, hyperlipidemia. Follows with Dr Luis Zaragoza for diaphragm paralysis, copd. Today he is doing well. He has not been exercising due to the weather. Used to work outside and [place] in the summer. He has a treadmill, but does not use it. He does have hip pain when he walks from his car to the office. No chest pain palpitations or dizziness. No syncope. Unchanged exertional shortness of breath, but has noticed awakening at night with shortness of breath, slight cough in am. He does have musculoskeletal chest wall pain when he reaches with his left hand behind his back. This is not new, has not increased in severity, and is alleviated with positioning. He has no change in exercise tolerance. He has unchanged peripheral edema. Patient is compliant with medications and is tolerating them well without side effects     HISTORY/ALLERGIES/ROS     MedHx:  has a past medical history of Afib ([address] Anxiety, Atrial fibrillation ([address] Benign essential hypertension, Cervical back pain with evidence of disc disease, Cramps, extremity, Elevated prostate specific antigen (PSA), Emphysema, Hypercholesteremia, and Kidney stones. SurgHx:  has a past surgical history that includes Throat surgery; eye surgery; hernia repair; Colonoscopy; Appendectomy; Vasectomy; Knee arthroscopy (Bilateral); TURP (2007); Cochlear implant (2017); and Carpal tunnel release (Bilateral). SocHx:  reports that he has never smoked. He has never used smokeless tobacco. He reports that he does not drink alcohol and does not use drugs.    FamHx: No family history of premature coronary artery disease, sudden death, or aneurysm  Allergies: Codeine and Timolol maleate ROS:   Review of Systems   Constitutional: Positive for fatigue. Negative for activity change, diaphoresis and fever. HENT: Negative for congestion and ear discharge. Eyes: Negative for photophobia and visual disturbance. Respiratory: Positive for shortness of breath. Negative for cough and chest tightness. Cardiovascular: Negative for chest pain and palpitations. Gastrointestinal: Negative for abdominal distention, abdominal pain, blood in stool and nausea. Endocrine: Negative for cold intolerance and polydipsia. Genitourinary: Negative for difficulty urinating and flank pain. Musculoskeletal: Positive for arthralgias and myalgias. Skin: Negative for rash and wound. Allergic/Immunologic: Negative for environmental allergies and immunocompromised state. Neurological: Positive for dizziness and headaches. Hematological: Negative for adenopathy. Does not bruise/bleed easily. Psychiatric/Behavioral: Negative for confusion. The patient is not hyperactive. MEDICATIONS      Prior to Admission medications    Medication Sig Start Date End Date Taking? Authorizing Provider   traMADol (ULTRAM) 50 MG tablet Take 50 mg by mouth every 6 hours as needed for Pain.    Yes Historical Provider, MD   NONFORMULARY Take 80 mg by mouth as needed Uric   Yes Historical Provider, MD   probenecid (BENEMID) 500 MG tablet Take 500 mg by mouth 2 times daily as needed   Yes Historical Provider, MD   naproxen (NAPROSYN) 500 MG tablet Take 500 mg by mouth 2 times daily (with meals)   Yes Historical Provider, MD   ipratropium-albuterol (DUONEB) 0.5-2.5 (3) MG/3ML SOLN nebulizer solution Inhale 3 mLs into the lungs every 4 hours DX:COPD J44.9 8/17/21  Yes Marquis Cari MD   PROAIR  (50 Base) MCG/ACT inhaler Inhale 2 puffs into the lungs every 6 hours as needed for Wheezing 8/17/21  Yes Marquis Cari MD   pravastatin (PRAVACHOL) 40 MG tablet TAKE 1 TABLET BY MOUTH EVERY OTHER DAY 7/6/21  Yes Abida Collins Nabeel Carcamo MD   dilTIAZem (CARDIZEM 12 HR) 60 MG extended release capsule Take 1 capsule by mouth 2 times daily 6/1/21  Yes Sarah Parsons DO   telmisartan-hydrochlorothiazide (MICARDIS HCT) 80-25 MG per tablet Take 1 tablet by mouth daily 6/1/21  Yes Sarah Parsons,    furosemide (LASIX) 20 MG tablet Daily 6/1/21  Yes Sarah Parsons DO   potassium chloride (KLOR-CON M) 10 MEQ extended release tablet TAKE 1 TABLET BY MOUTH DAILY WITH FUROSEMIDE AS NEEDED FOR SWELLING 6/1/21  Yes Zenaida Dailey MD   vitamin D 25 MCG (1000 UT) CAPS Take 1 capsule by mouth nightly   Yes Historical Provider, MD   ipratropium-albuterol (DUONEB) 0.5-2.5 (3) MG/3ML SOLN nebulizer solution Take 3 mLs by nebulization every 6 hours as needed for Shortness of Breath. 3/23/15  Yes Mariella Andre MD   latanoprost (XALATAN) 0.005 % ophthalmic solution Place 1 drop into both eyes nightly. Yes Historical Provider, MD       PHYSICAL EXAM        Vitals:    12/01/21 1318   BP: 132/70   Pulse: 91   SpO2: 98%    Weight: 219 lb 8 oz (99.6 kg)     Gen Alert, cooperative, no distress Heart  Regular rate and rhythm, no murmur   Head Normocephalic, atraumatic, no abnormalities Abd  Soft, NT, +BS, no mass, no OM   Eyes PERRLA, conj/corn clear Ext  Ext nl, AT, no C/C, trace edema   Nose Nares normal, no drain age, Non-tender Pulse 2+ and symmetric   Throat Lips, mucosa, tongue normal Skin Color/text/turg nl, no rash/lesions   Neck S/S, TM, NT, no bruit Psych Nl mood and affect   Lung  +crackles MSK Left shoulder pain with decreased range of motion    Ch wall NT, no deform       LABS and Imaging     Relevant and available CV data reviewed  Echo/MRI: 4/10/21  Normal left ventricular size, wall thickness, and systolic function with   ejection fraction estimated at 60%. No regional wall motion abnormalities are noted. Normal diastolic filling pattern for age. Trivial mitral regurgitation. Aortic valve appears sclerotic but opens adequately.    Inadequate tricuspid regurgitation to estimate systolic pulmonary artery   pressure. Normal right ventricular size and function. Cath:none  Holter:  4/12/2021  Event monitor  PAT noted NSR. Personally intepreted  EKG: sinus rhythm with sinus arrhythmia  Stress:4/12/21 normal myocardial perfusion study,normal LV size and fx   moderate Risk  moderate Complexity/Medical Decision Making  Outside records Reviewed  Labs Reviewed  Prior Imaging, ekg, cath, echo reviewed when available  Medications reviewed  Old Notes reviewed  ASSESSMENT AND PLAN   1. Hefpef  - slight edema and crackles  - on lasix 20 mg daily  -new problem  Plan  - for next three days, take 2 lasix daily, then go back to daily    2. Paroxsymal atrial tachycardia  - stable  - on cardizem 60 mg bid  Plan:  - continue cardizem  - call for change in symptoms     3. Essential Hypertension  - 132/70  -stable  Plan  -continue micardis/hctz 80/25 mg daily  - on cardizem 60 mg bid  - on lasix 20 mg dialy kdur 10 meq/  - 11/15/2021 k 4.3 bun 25 creat 1.0    4. Mixed Hyperlipidemia  - on pravachol 40 mg daily   - 11/15/21  Tc 149 hdl 48 ldl 82 tri 93  -stable  Plan  - continue pravachol    Patient counseled on lifestyle modification, diet, and exercise. Follow Up:    12 months  Dr. Jessenia Louis:  I, Gustavo Eugene, am scribing for and in the presence of Junito Reyna MD.   Isreal Del Real 12/01/21 1:36 PM   Physician Attestation  The scribe for and in the presence of kevan Alejandra DO). The deniseibe Pat Ledger may have prepopulated components of this note with my historical  intellectual property under my direct supervision. Any additions to this intellectual property were performed in my presence and at my direction. Furthermore, the content and accuracy of this note have been reviewed by me Basim Alejandra DO).   12/1/2021 1:36 PM

## 2021-12-01 NOTE — LETTER
[address]  [address] [address]  Phone: [phone]  Fax: [address], DO    December 2, 2021     Marvin Orlando, [address] [place] [address] [address]    Patient: Ford Lawrence   MR Number: [id number]   YOB: 1930   Date of Visit: 12/1/2021       Dear Marvin Orlando: Thank you for referring Reji Mlcean to me for evaluation/treatment. Below are the relevant portions of my assessment and plan of care. If you have questions, please do not hesitate to call me. I look forward to following Melissa Francis along with you.     Sincerely,      Andre Hansones, DO

## 2021-12-02 ASSESSMENT — ENCOUNTER SYMPTOMS: SHORTNESS OF BREATH: 1

## 2022-02-08 RX ORDER — TRAMADOL HYDROCHLORIDE 50 MG/1
TABLET ORAL
Qty: 90 TABLET | OUTPATIENT
Start: 2022-02-08

## 2022-02-09 ENCOUNTER — VIRTUAL VISIT (OUTPATIENT)
Dept: FAMILY MEDICINE CLINIC | Age: 87
End: 2022-02-09
Payer: MEDICARE

## 2022-02-09 DIAGNOSIS — I47.1 PAROXYSMAL ATRIAL TACHYCARDIA (HCC): ICD-10-CM

## 2022-02-09 DIAGNOSIS — R25.2 CRAMPS, EXTREMITY: ICD-10-CM

## 2022-02-09 DIAGNOSIS — J44.9 COPD, MILD (HCC): ICD-10-CM

## 2022-02-09 DIAGNOSIS — M50.90 CERVICAL BACK PAIN WITH EVIDENCE OF DISC DISEASE: ICD-10-CM

## 2022-02-09 DIAGNOSIS — I10 BENIGN ESSENTIAL HYPERTENSION: ICD-10-CM

## 2022-02-09 DIAGNOSIS — M15.9 PRIMARY OSTEOARTHRITIS INVOLVING MULTIPLE JOINTS: Primary | ICD-10-CM

## 2022-02-09 DIAGNOSIS — R97.20 ELEVATED PROSTATE SPECIFIC ANTIGEN (PSA): ICD-10-CM

## 2022-02-09 PROBLEM — M15.0 PRIMARY OSTEOARTHRITIS INVOLVING MULTIPLE JOINTS: Status: ACTIVE | Noted: 2022-02-09

## 2022-02-09 PROCEDURE — 99443 PR PHYS/QHP TELEPHONE EVALUATION 21-30 MIN: CPT | Performed by: FAMILY MEDICINE

## 2022-02-09 RX ORDER — TRAMADOL HYDROCHLORIDE 50 MG/1
50 TABLET ORAL EVERY 6 HOURS PRN
Qty: 90 TABLET | Refills: 0 | Status: SHIPPED | OUTPATIENT
Start: 2022-02-09 | End: 2022-07-25

## 2022-02-09 NOTE — PROGRESS NOTES
Reid Martell is a 80 y.o. male evaluated via telephone on 2/9/2022. Consent:  He and/or health care decision maker is aware that that he may receive a bill for this telephone service, which includes applicable co-pays, depending on his insurance coverage, and has provided verbal consent to proceed. Documentation:  I communicated with the patient and/or health care decision maker about see note. Details of this discussion including any medical advice provided: see note    Here for refill of tramadol  Uses prn arthritis pain - may have used 1-2 in last month  Hip pain is biggest issue - able to walk all day long leaning on cart  Left shoulder also hurts - fell and tore \"muscle loose\"  Seen by dr. Mary Stanford. Uses pulley over door - rom exercise helps pain  Wakes up and uses tramadol - works well - no side effects  Labs reviewed from 11/15/21 - looked good  Reviewed cardio and pulm notes from 12/21  Hx of mild copd/ diaphragm paralysis and diastolic dysfunction/ dina, PAT, HLD  Lost voice last week and had ha in am - went away w/ asa - better after 2-3 days. ?  Regarding covid - trying to avoid getting sick but doesn't seem like testing is very accurate  BP Readings from Last 3 Encounters:   12/01/21 132/70   07/07/21 124/70   06/01/21 120/70     Pulse Readings from Last 3 Encounters:   12/01/21 91   11/17/21 78   08/17/21 84     Wt Readings from Last 3 Encounters:   12/01/21 219 lb 8 oz (99.6 kg)   07/07/21 218 lb (98.9 kg)   06/01/21 219 lb 3.2 oz (99.4 kg)     Current Outpatient Medications   Medication Sig Dispense Refill    pravastatin (PRAVACHOL) 40 MG tablet Take 1 tablet by mouth See Admin Instructions Every other day 45 tablet 3    dilTIAZem (CARDIZEM 12 HR) 60 MG extended release capsule Take 1 capsule by mouth 2 times daily 90 capsule 3    telmisartan-hydroCHLOROthiazide (MICARDIS HCT) 80-25 MG per tablet Take 1 tablet by mouth daily 90 tablet 3    potassium chloride (KLOR-CON M) 10 MEQ extended release tablet Take 1 tablet by mouth daily 90 tablet 3    furosemide (LASIX) 20 MG tablet Take 1 tablet by mouth daily Daily 90 tablet 3    traMADol (ULTRAM) 50 MG tablet Take 50 mg by mouth every 6 hours as needed for Pain.  NONFORMULARY Take 80 mg by mouth as needed Uric      probenecid (BENEMID) 500 MG tablet Take 500 mg by mouth 2 times daily as needed      naproxen (NAPROSYN) 500 MG tablet Take 500 mg by mouth 2 times daily (with meals)      ipratropium-albuterol (DUONEB) 0.5-2.5 (3) MG/3ML SOLN nebulizer solution Inhale 3 mLs into the lungs every 4 hours DX:COPD J44.9 360 mL 11    PROAIR  (90 Base) MCG/ACT inhaler Inhale 2 puffs into the lungs every 6 hours as needed for Wheezing 1 Inhaler 11    vitamin D 25 MCG (1000 UT) CAPS Take 1 capsule by mouth nightly      ipratropium-albuterol (DUONEB) 0.5-2.5 (3) MG/3ML SOLN nebulizer solution Take 3 mLs by nebulization every 6 hours as needed for Shortness of Breath. 360 mL 1    latanoprost (XALATAN) 0.005 % ophthalmic solution Place 1 drop into both eyes nightly. No current facility-administered medications for this visit. Diagnosis Orders   1. Primary osteoarthritis involving multiple joints  traMADol (ULTRAM) 50 MG tablet   2. Cervical back pain with evidence of disc disease     3. Cramps, extremity     4. Benign essential hypertension     5.  COPD, mild (HCC)     6. Paroxysmal atrial tachycardia (HCC)     7. Elevated prostate specific antigen (PSA)       Needs repeat psa and possible urology referral - not clear if seen by urology in past -no significant sxs  F/u pulm/ cardio  bp seems stable on arb/ hctz/ cardizem w/ lasix for HFpEF  breathing stable  On statin  Using duoneb/ albuterol / oxygen  Tramadol used prn - not often and works well/ tolerated well  Refill done - f/u in next 4-6 months when next script is needed  oarrs reviewed and results c/w rx'd meds  Tramadol #28 on 10/12/21 - usually every 6 months  Gets 90  D/w pt covid prevention and difficulties of inaccurate testing as well as symptoms    I affirm this is a Patient Initiated Episode with a Patient who has not had a related appointment within my department in the past 7 days or scheduled within the next 24 hours. Patient identification was verified at the start of the visit: Yes    Total Time: 21 or more minutes were spent on the digital evaluation and management of this patient. Claudia Asencio was evaluated through a synchronous (real-time) audio encounter. The patient was located at home in a state where the provider was licensed to provide care.     Note: not billable if this call serves to triage the patient into an appointment for the relevant concern      Razia Baez MD

## 2022-06-07 RX ORDER — DILTIAZEM HYDROCHLORIDE 60 MG/1
CAPSULE, EXTENDED RELEASE ORAL
Qty: 90 CAPSULE | Refills: 3 | Status: SHIPPED | OUTPATIENT
Start: 2022-06-07

## 2022-07-05 RX ORDER — PRAVASTATIN SODIUM 40 MG
TABLET ORAL
Qty: 45 TABLET | Refills: 1 | Status: SHIPPED | OUTPATIENT
Start: 2022-07-05

## 2022-07-22 DIAGNOSIS — M15.9 PRIMARY OSTEOARTHRITIS INVOLVING MULTIPLE JOINTS: ICD-10-CM

## 2022-07-25 RX ORDER — TRAMADOL HYDROCHLORIDE 50 MG/1
TABLET ORAL
Qty: 90 TABLET | Refills: 0 | Status: SHIPPED | OUTPATIENT
Start: 2022-07-25 | End: 2022-10-19 | Stop reason: SDUPTHER

## 2022-09-20 DIAGNOSIS — J44.9 COPD, MILD (HCC): Primary | ICD-10-CM

## 2022-09-21 RX ORDER — IPRATROPIUM BROMIDE AND ALBUTEROL SULFATE 2.5; .5 MG/3ML; MG/3ML
SOLUTION RESPIRATORY (INHALATION)
Qty: 360 ML | Refills: 0 | Status: SHIPPED | OUTPATIENT
Start: 2022-09-21

## 2022-10-04 ENCOUNTER — TELEPHONE (OUTPATIENT)
Dept: PULMONOLOGY | Age: 87
End: 2022-10-04

## 2022-10-04 NOTE — TELEPHONE ENCOUNTER
Received renewal for his O2 from [facility] Pt last seen  and was due back in 6 months.  Called pt to see if we could make him an appointment and his wife stated he doesn't need one because he is doing fine and will call us back when he needs to be seen

## 2022-10-19 ENCOUNTER — TELEPHONE (OUTPATIENT)
Dept: FAMILY MEDICINE CLINIC | Age: 87
End: 2022-10-19

## 2022-10-19 DIAGNOSIS — M15.9 PRIMARY OSTEOARTHRITIS INVOLVING MULTIPLE JOINTS: ICD-10-CM

## 2022-10-19 RX ORDER — TRAMADOL HYDROCHLORIDE 50 MG/1
50 TABLET ORAL EVERY 8 HOURS PRN
Qty: 90 TABLET | Refills: 0 | Status: SHIPPED | OUTPATIENT
Start: 2022-10-19 | End: 2023-01-17

## 2022-10-19 NOTE — TELEPHONE ENCOUNTER
Patient need to speak with someone concerning only a partial refill on his medication TRAMADOL 50 MG TABLETS. [address], [address] - PHONE NO. [phone]    PLEASE GIVE HIM A CALL BACK.

## 2022-10-19 NOTE — TELEPHONE ENCOUNTER
SPOKE TO PT HE STATED HE WAS ONLY GIVEN A 7 DAY SUPPLY, I CALLED THE PHARMACY AND ITS NEEDING A PA. THEY ARE GOING TO FAX THE PA OVER CAN YOU SEND THE 90 DAY SUPPLY OVER SO THE PA DEPARTMENT CAN DO THE PA I HAD  H IM SCHEDULE AN APPOINTMENT AS HE IS OVERDUE. Rocael Reyna

## 2022-10-20 NOTE — TELEPHONE ENCOUNTER
Submitted PA for traMADol HCl  Via CM Key: [id number]  STATUS:  \"THIS MEDICATION IS ON YOUR PLANS LIST OF COVERED DRUGS. PRIOR AUTHORIZATION IS NOT REQUIRED AT THIS TIME. \"    If this requires a response please respond to the pool. [address]). Please advise patient thank you.

## 2022-11-21 ENCOUNTER — OFFICE VISIT (OUTPATIENT)
Dept: CARDIOLOGY CLINIC | Age: 87
End: 2022-11-21
Payer: MEDICARE

## 2022-11-21 VITALS
BODY MASS INDEX: 35.17 KG/M2 | DIASTOLIC BLOOD PRESSURE: 62 MMHG | SYSTOLIC BLOOD PRESSURE: 128 MMHG | HEART RATE: 88 BPM | HEIGHT: 64 IN | OXYGEN SATURATION: 98 % | WEIGHT: 206 LBS

## 2022-11-21 DIAGNOSIS — I10 ESSENTIAL HYPERTENSION: ICD-10-CM

## 2022-11-21 DIAGNOSIS — I50.32 CHRONIC HEART FAILURE WITH PRESERVED EJECTION FRACTION (HCC): ICD-10-CM

## 2022-11-21 DIAGNOSIS — E78.2 MIXED HYPERLIPIDEMIA: ICD-10-CM

## 2022-11-21 DIAGNOSIS — I47.1 PAROXYSMAL ATRIAL TACHYCARDIA (HCC): Primary | ICD-10-CM

## 2022-11-21 PROCEDURE — 1123F ACP DISCUSS/DSCN MKR DOCD: CPT | Performed by: INTERNAL MEDICINE

## 2022-11-21 PROCEDURE — G8427 DOCREV CUR MEDS BY ELIG CLIN: HCPCS | Performed by: INTERNAL MEDICINE

## 2022-11-21 PROCEDURE — G8484 FLU IMMUNIZE NO ADMIN: HCPCS | Performed by: INTERNAL MEDICINE

## 2022-11-21 PROCEDURE — 99214 OFFICE O/P EST MOD 30 MIN: CPT | Performed by: INTERNAL MEDICINE

## 2022-11-21 PROCEDURE — 1036F TOBACCO NON-USER: CPT | Performed by: INTERNAL MEDICINE

## 2022-11-21 PROCEDURE — G8417 CALC BMI ABV UP PARAM F/U: HCPCS | Performed by: INTERNAL MEDICINE

## 2022-11-21 RX ORDER — PRAVASTATIN SODIUM 40 MG
TABLET ORAL
Qty: 45 TABLET | Refills: 1 | Status: SHIPPED | OUTPATIENT
Start: 2022-11-21

## 2022-11-21 ASSESSMENT — ENCOUNTER SYMPTOMS
SHORTNESS OF BREATH: 1
CHEST TIGHTNESS: 0
NAUSEA: 0
PHOTOPHOBIA: 0
COUGH: 0
ABDOMINAL PAIN: 0
ABDOMINAL DISTENTION: 0
BLOOD IN STOOL: 0

## 2022-11-21 NOTE — PROGRESS NOTES
[address]  11/21/22  Referring: Dr. So Bowman CONSULT/CHIEF COMPLAINT/HPI     Reason for visit/ Chief complaint  Follow up   Atrial tachycardia, risk factor modification   HPI Stiven Sandhu is a 80 y.o. male with a history of atrial tachycardia, hypertension, hyperlipidemia. 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 Today, he is here with his wife. He reports concerns with the left side of his abdomen and left shoulder. Reports left sided chest discomfort/soreness and that he cannot raise his left arm all the way up; he feels has has a significant \"knot\" impeding his day to day activity and movement. He reports that he \"Felt a bulge\" under his ribs. He states that it \"went back in.\" Patient denies shortness of breath, palpitations, or dizziness. States he has lost 20 lbs in recent months due to appetite changes. Denies symptoms with walking through the grocery store, though he states his hip limits him. States Dr. Yani Carolina told him he needed chronic oxygen, however he only wears at nighttime and PRN during the day. Patient is compliant with medications and is tolerating them well without side effects. HISTORY/ALLERGIES/ROS     MedHx:  has a past medical history of Afib ([address] Anxiety, Atrial fibrillation ([address] Benign essential hypertension, Cervical back pain with evidence of disc disease, Cramps, extremity, Elevated prostate specific antigen (PSA), Emphysema, Hypercholesteremia, and Kidney stones. SurgHx:  has a past surgical history that includes Throat surgery; eye surgery; hernia repair; Colonoscopy; Appendectomy; Vasectomy; Knee arthroscopy (Bilateral); TURP (2007); Cochlear implant (2017); and Carpal tunnel release (Bilateral). SocHx:  reports that he has never smoked. He has never used smokeless tobacco. He reports that he does not drink alcohol and does not use drugs.    FamHx: No family history of premature coronary artery disease, sudden death, or aneurysm  Allergies: Codeine and Timolol maleate   ROS:   Review of Systems   Constitutional:  Positive for fatigue. Negative for activity change, diaphoresis and fever. HENT:  Negative for congestion and ear discharge. Eyes:  Negative for photophobia and visual disturbance. Respiratory:  Positive for shortness of breath. Negative for cough and chest tightness. Cardiovascular:  Negative for chest pain and palpitations. Gastrointestinal:  Negative for abdominal distention, abdominal pain, blood in stool and nausea. Endocrine: Negative for cold intolerance and polydipsia. Genitourinary:  Negative for difficulty urinating and flank pain. Musculoskeletal:  Positive for arthralgias and myalgias. Skin:  Negative for rash and wound. Allergic/Immunologic: Negative for environmental allergies and immunocompromised state. Neurological:  Positive for dizziness and headaches. Hematological:  Negative for adenopathy. Does not bruise/bleed easily. Psychiatric/Behavioral:  Negative for confusion. The patient is not hyperactive. MEDICATIONS      Prior to Admission medications    Medication Sig Start Date End Date Taking? Authorizing Provider   traMADol (ULTRAM) 50 MG tablet Take 1 tablet by mouth every 8 hours as needed for Pain for up to 90 days.  10/19/22 1/17/23 Yes Ricky Yadav MD   pravastatin (PRAVACHOL) 40 MG tablet TAKE 1 TABLET BY MOUTH EVERY OTHER DAY 7/5/22  Yes Ricky Yadav MD   dilTIAZem (CARDIZEM 12 HR) 60 MG extended release capsule TAKE 1 CAPSULE BY MOUTH TWICE DAILY 6/7/22  Yes Landon Bryant DO   telmisartan-hydroCHLOROthiazide (MICARDIS HCT) 80-25 MG per tablet Take 1 tablet by mouth daily 12/1/21  Yes Landon Bryant DO   potassium chloride (KLOR-CON M) 10 MEQ extended release tablet Take 1 tablet by mouth daily 12/1/21  Yes Landon Bryant DO   furosemide (LASIX) 20 MG tablet Take 1 tablet by mouth daily Daily 12/1/21  Yes Landon Bryant DO   probenecid (BENEMID) 500 MG tablet Take 500 mg by mouth 2 times daily as needed   Yes Historical Provider, MD   naproxen (NAPROSYN) 500 MG tablet Take 500 mg by mouth 2 times daily (with meals)   Yes Historical Provider, MD   vitamin D 25 MCG (1000 UT) CAPS Take 1 capsule by mouth nightly   Yes Historical Provider, MD   latanoprost (XALATAN) 0.005 % ophthalmic solution Place 1 drop into both eyes nightly. Yes Historical Provider, MD   ipratropium-albuterol (DUONEB) 0.5-2.5 (3) MG/3ML SOLN nebulizer solution INHALE 3 MLS INTO THE LUNGS USING NEBULIZER EVERY 4 HOURS 9/21/22   Costa Quiroga MD   NONFORMULARY Take 80 mg by mouth as needed Uric    Historical Provider, MD   PROAIR  (90 Base) MCG/ACT inhaler Inhale 2 puffs into the lungs every 6 hours as needed for Wheezing 8/17/21   Costa Quiroga MD   ipratropium-albuterol (DUONEB) 0.5-2.5 (3) MG/3ML SOLN nebulizer solution Take 3 mLs by nebulization every 6 hours as needed for Shortness of Breath. 3/23/15   Brenda Ortega MD       PHYSICAL EXAM        Vitals:    11/21/22 1327   BP: 128/62   Pulse: 88   SpO2: 98%      Weight: 206 lb (93.4 kg)     Gen Alert, cooperative, no distress Heart  Regular rate and rhythm, no murmur   Head Normocephalic, atraumatic, no abnormalities Abd  Soft, NT, +BS, no mass, no OM   Eyes PERRLA, conj/corn clear Ext  Ext nl, AT, no C/C, trace edema   Nose Nares normal, no drain age, Non-tender Pulse 2+ and symmetric   Throat Lips, mucosa, tongue normal Skin Color/text/turg nl, no rash/lesions   Neck S/S, TM, NT, no bruit Psych Nl mood and affect   Lung  +crackles MSK Left shoulder pain with decreased range of motion    Ch wall NT, no deform       LABS and Imaging     Relevant and available CV data reviewed  Echo/MRI: 4/10/21  Normal left ventricular size, wall thickness, and systolic function with   ejection fraction estimated at 60%. No regional wall motion abnormalities are noted. Normal diastolic filling pattern for age.    Trivial mitral regurgitation. Aortic valve appears sclerotic but opens adequately. Inadequate tricuspid regurgitation to estimate systolic pulmonary artery   pressure. Normal right ventricular size and function. Cath:none    Holter:  4/12/2021  Event monitor  PAT noted NSR. Personally intepreted  EKG: sinus rhythm with sinus arrhythmia  Stress:4/12/21 normal myocardial perfusion study,normal LV size and fx   moderate Risk  moderate Complexity/Medical Decision Making  Outside records Reviewed  Labs Reviewed  Prior Imaging, ekg, cath, echo reviewed when available  Medications reviewed  Old Notes reviewed  ASSESSMENT AND PLAN   1. Hefpef  - stable  - on lasix 20 mg daily  Plan  - continue on furosemide 20 mg daily  - no changes today    2. Paroxsymal atrial tachycardia  - stable  - on cardizem 60 mg bid  Plan:  - continue cardizem  - call for change in symptoms     3. Essential Hypertension  - controlled today  - stable  Plan  - continue micardis/hctz 80/25mg daily  - on cardizem 60mg bid  - on lasix 20 mg dialy kdur 10 meq  - 6/14/22 k 3.9 bun 27 creat 1.25    4. Mixed Hyperlipidemia  - on pravachol 40mg daily   - 6/14/22  Tc 153 tg 109 hdl 42 ldl 89  - stable  Plan  - continue pravachol    5. Abdominal discomfort  - new problem  - sounds like hernia  - resolved  Plan:-  - monitor clinically    Patient counseled on lifestyle modification, diet, and exercise. Follow Up: 12 months    Dr. Galo Ast: This note was scribed in the presence of Dr. Teto Reinoso DO by Lady Rosa RN.

## 2022-11-21 NOTE — LETTER
[address]  [address] [address]  Phone: [phone]  Fax: [address], DO    November 21, 2022     Maryse Ames, [address] [place] [address] [address]    Patient: Raciel Bai   MR Number: [id number]   YOB: 1930   Date of Visit: 11/21/2022       Dear Maryse Ames: Thank you for referring Heath Grandchild to me for evaluation/treatment. Below are the relevant portions of my assessment and plan of care. If you have questions, please do not hesitate to call me. I look forward to following Neetu Vigil along with you.     Sincerely,      Smitha Yin, DO

## 2022-11-21 NOTE — PROGRESS NOTES
[address]  11/21/22  Referring: Dr. Marques Solorzano CONSULT/CHIEF COMPLAINT/HPI     Reason for visit/ Chief complaint  Follow up   Atrial tachycardia   HPI Aunbacilio Houston is a 80 y.o. male with a history of atrial tachycardia, hypertension, hyperlipidemia. Follows with Dr Mindy Canseco for diaphragm paralysis, copd. Today,  he    Patient is compliant with medications and is tolerating them well without side effects. HISTORY/ALLERGIES/ROS     MedHx:  has a past medical history of Afib ([address] Anxiety, Atrial fibrillation ([address] Benign essential hypertension, Cervical back pain with evidence of disc disease, Cramps, extremity, Elevated prostate specific antigen (PSA), Emphysema, Hypercholesteremia, and Kidney stones. SurgHx:  has a past surgical history that includes Throat surgery; eye surgery; hernia repair; Colonoscopy; Appendectomy; Vasectomy; Knee arthroscopy (Bilateral); TURP (2007); Cochlear implant (2017); and Carpal tunnel release (Bilateral). SocHx:  reports that he has never smoked. He has never used smokeless tobacco. He reports that he does not drink alcohol and does not use drugs. FamHx: No family history of premature coronary artery disease, sudden death, or aneurysm  Allergies: Codeine and Timolol maleate   ROS:   Review of Systems   Constitutional:  Positive for fatigue. Negative for activity change, diaphoresis and fever. HENT:  Negative for congestion and ear discharge. Eyes:  Negative for photophobia and visual disturbance. Respiratory:  Positive for shortness of breath. Negative for cough and chest tightness. Cardiovascular:  Negative for chest pain and palpitations. Gastrointestinal:  Negative for abdominal distention, abdominal pain, blood in stool and nausea. Endocrine: Negative for cold intolerance and polydipsia. Genitourinary:  Negative for difficulty urinating and flank pain. Musculoskeletal:  Positive for arthralgias and myalgias.    Skin:  Negative for rash and wound. Allergic/Immunologic: Negative for environmental allergies and immunocompromised state. Neurological:  Positive for dizziness and headaches. Hematological:  Negative for adenopathy. Does not bruise/bleed easily. Psychiatric/Behavioral:  Negative for confusion. The patient is not hyperactive. MEDICATIONS      Prior to Admission medications    Medication Sig Start Date End Date Taking? Authorizing Provider   traMADol (ULTRAM) 50 MG tablet Take 1 tablet by mouth every 8 hours as needed for Pain for up to 90 days.  10/19/22 1/17/23  Magdalena Wright MD   ipratropium-albuterol (DUONEB) 0.5-2.5 (3) MG/3ML SOLN nebulizer solution INHALE 3 MLS INTO THE LUNGS USING NEBULIZER EVERY 4 HOURS 9/21/22   Devang Ely MD   pravastatin (PRAVACHOL) 40 MG tablet TAKE 1 TABLET BY MOUTH EVERY OTHER DAY 7/5/22   Magdalena Wright MD   dilTIAZem (CARDIZEM 12 HR) 60 MG extended release capsule TAKE 1 CAPSULE BY MOUTH TWICE DAILY 6/7/22   Álvaro Hawkins,    telmisartan-hydroCHLOROthiazide (MICARDIS HCT) 80-25 MG per tablet Take 1 tablet by mouth daily 12/1/21   Álvaro Darting, DO   potassium chloride (KLOR-CON M) 10 MEQ extended release tablet Take 1 tablet by mouth daily 12/1/21   Álvaro Darting, DO   furosemide (LASIX) 20 MG tablet Take 1 tablet by mouth daily Daily 12/1/21   Álvaro Darting, DO   NONFORMULARY Take 80 mg by mouth as needed Uric    Historical Provider, MD   probenecid (BENEMID) 500 MG tablet Take 500 mg by mouth 2 times daily as needed    Historical Provider, MD   naproxen (NAPROSYN) 500 MG tablet Take 500 mg by mouth 2 times daily (with meals)    Historical Provider, MD   PROAIR  (90 Base) MCG/ACT inhaler Inhale 2 puffs into the lungs every 6 hours as needed for Wheezing 8/17/21   Devang Ely MD   vitamin D 25 MCG (1000 UT) CAPS Take 1 capsule by mouth nightly    Historical Provider, MD   ipratropium-albuterol (DUONEB) 0.5-2.5 (3) MG/3ML SOLN nebulizer solution Take 3 mLs by nebulization every 6 hours as needed for Shortness of Breath. 3/23/15   Eugenia Espino MD   latanoprost (XALATAN) 0.005 % ophthalmic solution Place 1 drop into both eyes nightly. Historical Provider, MD       PHYSICAL EXAM        There were no vitals filed for this visit. Gen Alert, cooperative, no distress Heart  Regular rate and rhythm, no murmur   Head Normocephalic, atraumatic, no abnormalities Abd  Soft, NT, +BS, no mass, no OM   Eyes PERRLA, conj/corn clear Ext  Ext nl, AT, no C/C, trace edema   Nose Nares normal, no drain age, Non-tender Pulse 2+ and symmetric   Throat Lips, mucosa, tongue normal Skin Color/text/turg nl, no rash/lesions   Neck S/S, TM, NT, no bruit Psych Nl mood and affect   Lung  +crackles MSK Left shoulder pain with decreased range of motion    Ch wall NT, no deform       LABS and Imaging     Relevant and available CV data reviewed  Echo/MRI: 4/10/21  Normal left ventricular size, wall thickness, and systolic function with   ejection fraction estimated at 60%. No regional wall motion abnormalities are noted. Normal diastolic filling pattern for age. Trivial mitral regurgitation. Aortic valve appears sclerotic but opens adequately. Inadequate tricuspid regurgitation to estimate systolic pulmonary artery   pressure. Normal right ventricular size and function. Cath:none    Holter:  4/12/2021  Event monitor  PAT noted NSR. Personally intepreted  EKG: sinus rhythm with sinus arrhythmia  Stress:4/12/21 normal myocardial perfusion study,normal LV size and fx   moderate Risk  moderate Complexity/Medical Decision Making  Outside records Reviewed  Labs Reviewed  Prior Imaging, ekg, cath, echo reviewed when available  Medications reviewed  Old Notes reviewed  ASSESSMENT AND PLAN   1. Hefpef  - stable  - on lasix 20 mg daily  - newer problem  Plan  - continue treatment plan    2. Paroxsymal atrial tachycardia  - stable  - on cardizem 60 mg bid  Plan:  - continue cardizem  - call for change in symptoms     3. Essential Hypertension  -   - stable  Plan  - continue micardis/hctz 80/25mg daily  - on cardizem 60mg bid  - on lasix 20 mg dialy kdur 10 meq/  - 6/14/22 k 3.9 bun 27 creat 1.25    4. Mixed Hyperlipidemia  - on pravachol 40mg daily   - 6/14/22  Tc 153 tg 109 hdl 42 ldl 89  - stable  Plan  - continue pravachol    Patient counseled on lifestyle modification, diet, and exercise.     Follow Up:     Dr. Felisha Crocker

## 2022-11-21 NOTE — PATIENT INSTRUCTIONS
Recommend continuing Pravastatin from a cardiac perspective     Recommend asking your pulmonologist if you need oxygen around the clock    Follow up in 1 year  Call with questions or concerns

## 2022-12-05 RX ORDER — DILTIAZEM HYDROCHLORIDE 60 MG/1
CAPSULE, EXTENDED RELEASE ORAL
Qty: 90 CAPSULE | Refills: 3 | Status: SHIPPED | OUTPATIENT
Start: 2022-12-05

## 2022-12-05 NOTE — TELEPHONE ENCOUNTER
Received refill request for Diltiazem from [facility].     Last ov: 11/21/2022 DKW    Last Refill: 06/07/2022 #90 w/ 3 refills    Next appointment: 11/21/2023 ROSARIO

## 2022-12-08 DIAGNOSIS — I47.1 PAROXYSMAL ATRIAL TACHYCARDIA (HCC): Primary | ICD-10-CM

## 2022-12-08 RX ORDER — TELMISARTAN AND HYDROCHLORTHIAZIDE 80; 25 MG/1; MG/1
1 TABLET ORAL DAILY
Qty: 90 TABLET | Refills: 1 | Status: SHIPPED | OUTPATIENT
Start: 2022-12-08

## 2022-12-09 NOTE — TELEPHONE ENCOUNTER
Received refill request for Furosemide from [facility].     Last ov: 11/21/2022 DKW    Last Refill: 12/01/2021 #90 w/ 3 refills    Next appointment: 11/21/2023 REECE

## 2022-12-11 RX ORDER — FUROSEMIDE 20 MG/1
TABLET ORAL
Qty: 90 TABLET | Refills: 3 | Status: SHIPPED | OUTPATIENT
Start: 2022-12-11

## 2022-12-12 RX ORDER — POTASSIUM CHLORIDE 750 MG/1
10 TABLET, EXTENDED RELEASE ORAL DAILY
Qty: 90 TABLET | Refills: 1 | Status: SHIPPED | OUTPATIENT
Start: 2022-12-12

## 2022-12-12 NOTE — TELEPHONE ENCOUNTER
Received refill request for KLOR-CON from [address]     Last ov: 11/21/2022 DKW    Last Refill: 12/01/2021 #90 w/ 3 refills    Next appointment: 11/21/2023 REECE

## 2022-12-21 ENCOUNTER — OFFICE VISIT (OUTPATIENT)
Dept: FAMILY MEDICINE CLINIC | Age: 87
End: 2022-12-21
Payer: MEDICARE

## 2022-12-21 VITALS
BODY MASS INDEX: 35.17 KG/M2 | WEIGHT: 206 LBS | SYSTOLIC BLOOD PRESSURE: 122 MMHG | HEIGHT: 64 IN | DIASTOLIC BLOOD PRESSURE: 76 MMHG | OXYGEN SATURATION: 95 % | HEART RATE: 87 BPM

## 2022-12-21 DIAGNOSIS — M50.90 CERVICAL BACK PAIN WITH EVIDENCE OF DISC DISEASE: ICD-10-CM

## 2022-12-21 DIAGNOSIS — I47.1 PAROXYSMAL ATRIAL TACHYCARDIA (HCC): ICD-10-CM

## 2022-12-21 DIAGNOSIS — M79.676 PAIN OF GREAT TOE, UNSPECIFIED LATERALITY: ICD-10-CM

## 2022-12-21 DIAGNOSIS — G47.33 OSA ON CPAP: ICD-10-CM

## 2022-12-21 DIAGNOSIS — E66.01 SEVERE OBESITY (BMI 35.0-39.9) WITH COMORBIDITY (HCC): ICD-10-CM

## 2022-12-21 DIAGNOSIS — H90.5 SENSORINEURAL HEARING LOSS (SNHL), UNSPECIFIED LATERALITY: ICD-10-CM

## 2022-12-21 DIAGNOSIS — R22.2 NODULE OF LEFT ANTERIOR CHEST WALL: Primary | ICD-10-CM

## 2022-12-21 DIAGNOSIS — I50.32 CHRONIC HEART FAILURE WITH PRESERVED EJECTION FRACTION (HCC): ICD-10-CM

## 2022-12-21 DIAGNOSIS — N28.9 MILD RENAL INSUFFICIENCY: ICD-10-CM

## 2022-12-21 DIAGNOSIS — J44.9 COPD, MILD (HCC): ICD-10-CM

## 2022-12-21 DIAGNOSIS — M25.512 CHRONIC LEFT SHOULDER PAIN: ICD-10-CM

## 2022-12-21 DIAGNOSIS — Z99.89 OSA ON CPAP: ICD-10-CM

## 2022-12-21 DIAGNOSIS — G89.29 CHRONIC LEFT SHOULDER PAIN: ICD-10-CM

## 2022-12-21 DIAGNOSIS — Z79.899 LONG TERM USE OF DRUG: ICD-10-CM

## 2022-12-21 DIAGNOSIS — M15.9 PRIMARY OSTEOARTHRITIS INVOLVING MULTIPLE JOINTS: ICD-10-CM

## 2022-12-21 DIAGNOSIS — J96.11 CHRONIC HYPOXEMIC RESPIRATORY FAILURE (HCC): ICD-10-CM

## 2022-12-21 DIAGNOSIS — R07.81 RIB PAIN ON LEFT SIDE: ICD-10-CM

## 2022-12-21 DIAGNOSIS — J98.6 DIAPHRAGM PARALYSIS: ICD-10-CM

## 2022-12-21 LAB
ANION GAP SERPL CALCULATED.3IONS-SCNC: 13 MMOL/L (ref 3–16)
BUN BLDV-MCNC: 33 MG/DL (ref 7–20)
CALCIUM SERPL-MCNC: 9.8 MG/DL (ref 8.3–10.6)
CHLORIDE BLD-SCNC: 97 MMOL/L (ref 99–110)
CO2: 30 MMOL/L (ref 21–32)
CREAT SERPL-MCNC: 1.1 MG/DL (ref 0.8–1.3)
DRUGS EXPECTED: NORMAL
GFR SERPL CREATININE-BSD FRML MDRD: >60 ML/MIN/{1.73_M2}
GLUCOSE BLD-MCNC: 95 MG/DL (ref 70–99)
POTASSIUM SERPL-SCNC: 4.2 MMOL/L (ref 3.5–5.1)
SODIUM BLD-SCNC: 140 MMOL/L (ref 136–145)
URIC ACID, SERUM: 9.4 MG/DL (ref 3.5–7.2)

## 2022-12-21 PROCEDURE — 80305 DRUG TEST PRSMV DIR OPT OBS: CPT | Performed by: FAMILY MEDICINE

## 2022-12-21 PROCEDURE — 1123F ACP DISCUSS/DSCN MKR DOCD: CPT | Performed by: FAMILY MEDICINE

## 2022-12-21 PROCEDURE — 3023F SPIROM DOC REV: CPT | Performed by: FAMILY MEDICINE

## 2022-12-21 PROCEDURE — 1036F TOBACCO NON-USER: CPT | Performed by: FAMILY MEDICINE

## 2022-12-21 PROCEDURE — G8427 DOCREV CUR MEDS BY ELIG CLIN: HCPCS | Performed by: FAMILY MEDICINE

## 2022-12-21 PROCEDURE — 99214 OFFICE O/P EST MOD 30 MIN: CPT | Performed by: FAMILY MEDICINE

## 2022-12-21 PROCEDURE — 36415 COLL VENOUS BLD VENIPUNCTURE: CPT | Performed by: FAMILY MEDICINE

## 2022-12-21 PROCEDURE — G8417 CALC BMI ABV UP PARAM F/U: HCPCS | Performed by: FAMILY MEDICINE

## 2022-12-21 PROCEDURE — G8484 FLU IMMUNIZE NO ADMIN: HCPCS | Performed by: FAMILY MEDICINE

## 2022-12-21 SDOH — ECONOMIC STABILITY: FOOD INSECURITY: WITHIN THE PAST 12 MONTHS, YOU WORRIED THAT YOUR FOOD WOULD RUN OUT BEFORE YOU GOT MONEY TO BUY MORE.: NEVER TRUE

## 2022-12-21 SDOH — ECONOMIC STABILITY: FOOD INSECURITY: WITHIN THE PAST 12 MONTHS, THE FOOD YOU BOUGHT JUST DIDN'T LAST AND YOU DIDN'T HAVE MONEY TO GET MORE.: NEVER TRUE

## 2022-12-21 ASSESSMENT — SOCIAL DETERMINANTS OF HEALTH (SDOH): HOW HARD IS IT FOR YOU TO PAY FOR THE VERY BASICS LIKE FOOD, HOUSING, MEDICAL CARE, AND HEATING?: NOT HARD AT ALL

## 2022-12-21 ASSESSMENT — PATIENT HEALTH QUESTIONNAIRE - PHQ9
SUM OF ALL RESPONSES TO PHQ QUESTIONS 1-9: 0
SUM OF ALL RESPONSES TO PHQ9 QUESTIONS 1 & 2: 0
SUM OF ALL RESPONSES TO PHQ QUESTIONS 1-9: 0
2. FEELING DOWN, DEPRESSED OR HOPELESS: 0
1. LITTLE INTEREST OR PLEASURE IN DOING THINGS: 0

## 2022-12-21 NOTE — LETTER
CONTROLLED SUBSTANCE MEDICATION AGREEMENT     Patient Name: Anastacio Meza  Patient YOB: 1930   I understand, that controlled substance medications may be used to help better manage my symptoms and to improve my ability to function at home, work and in social settings. However, I also understand that these medications do have risks, which have been discussed with me, including possible development of physical or psychological dependence. I understand that successful treatment requires mutual trust and honesty between me and my provider. I understand and agree that following this Medication Agreement is necessary in continuing my provider-patient relationship and the success of my treatment plan. Explanation from my Provider: Benefits and Goals of Controlled Substance Medications: There are two potential goals for your treatment: (1) decreased pain and suffering (2) improved daily life functions. There are many possible treatments for your chronic condition(s). Alternatives such as physical therapy, yoga, massage, home daily exercise, meditation, relaxation techniques, injections, chiropractic manipulations, surgery, cognitive therapy, hypnosis and many medications that are not habit-forming may be used. Use of controlled substance medications may be helpful, but they are unlikely to resolve all symptoms or restore all function. Explanation from my Provider: Risks of Controlled Substance Medications:  Opioid pain medications: These medications can lead to problems such as addiction/dependence, sedation, lightheadedness/dizziness, memory issues, falls, constipation, nausea, or vomiting. They may also impair the ability to drive or operate machinery. Additionally, these medications may lower testosterone levels, leading to loss of bone strength, stamina and sex drive.   They may cause problems with breathing, sleep apnea and reduced coughing, which is especially dangerous for patients with lung disease. Overdose or dangerous interactions with alcohol and other medications may occur, leading to death. Hyperalgesia may develop, which means patients receiving opioids for the treatment of pain may become more sensitive to certain painful stimuli, and in some cases, experience pain from ordinarily non-painful stimuli. Women between the ages of 14-53 who could become pregnant should carefully weigh the risks and benefits of opioids with their physicians, as these medications increase the risk of pregnancy complications, including miscarriage,  delivery and stillbirth. It is also possible for babies to be born addicted to opioids. Opioid dependence withdrawal symptoms may include; feelings of uneasiness, increased pain, irritability, belly pain, diarrhea, sweats and goose-flesh. Benzodiazepines and non-benzodiazepine sleep medications: These medications can lead to problems such as addiction/dependence, sedation, fatigue, lightheadedness, dizziness, incoordination, falls, depression, hallucinations, and impaired judgment, memory and concentration. The ability to drive and operate machinery may also be affected. Abnormal sleep-related behaviors have been reported, including sleepwalking, driving, making telephone calls, eating, or having sex while not fully awake. These medications can suppress breathing and worsen sleep apnea, particularly when combined with alcohol or other sedating medications, potentially leading to death. Dependence withdrawal symptoms may include tremors, anxiety, hallucinations and seizures. Stimulants:  Common adverse effects include addiction/dependence, increased blood  pressure and heart rate, decreased appetite, nausea, involuntary weight loss, insomnia,                                                                                                                     Initials:_______   irritability, and headaches.   These risks may increase when these medications are combined with other stimulants, such as caffeine pills or energy drinks, certain weight loss supplements and oral decongestants. Dependence withdrawal symptoms may include depressed mood, loss of interest, suicidal thoughts, anxiety, fatigue, appetite changes and agitation. Testosterone replacement therapy:  Potential side effects include increased risk of stroke and heart attack, blood clots, increased blood pressure, increased cholesterol, enlarged prostate, sleep apnea, irritability/aggression and other mood disorders, and decreased fertility. I agree and understand that I and my prescriber have the following rights and responsibilities regarding my treatment plan:     1. MY RIGHTS:  To be informed of my treatment and medication plan. To be an active participant in my health and wellbeing. 2. MY RESPONSIBILITY AND UNDERSTANDING FOR USE OF MEDICATIONS   I will take medications at the dose and frequency as directed. For my safety, I will not increase or change how I take my medications without the recommendation of my healthcare provider.  I will actively participate in any program recommended by my provider which may improve function, including social, physical, psychological programs.  I will not take my medications with alcohol or other drugs not prescribed to me. I understand that drinking alcohol with my medications increases the chances of side effects, including reduced breathing rate and could lead to personal injury when operating machinery.  I understand that if I have a history of substance use disorders, including alcohol or other illicit drugs, that I may be at increased risk of addiction to my medications.  I agree to notify my provider immediately if I should become pregnant so that my treatment plan can be adjusted.    I agree and understand that I shall only receive controlled substance medications from the prescriber that signed this agreement unless there is [email or web address]    5. MY RESPONSIBILITY WITH ILLEGAL DRUGS    I will not use illegal or street drugs or another person's prescription medications not prescribed to me.  If there are identified addiction type symptoms, then referral to a program may be provided by my provider and I agree to follow through with this recommendation. 6. MY RESPONSIBILITY FOR COOPERATION WITH INVESTIGATIONS   I understand that my provider will comply with any applicable law and may discuss my use and/or possible misuse/abuse of controlled substances and alcohol, as appropriate, with any health care provider involved in my care, pharmacist, or legal authority.  I authorize my provider and pharmacy to cooperate fully with law enforcement agencies (as permitted by law) in the investigation of any possible misuse, sale, or other diversion of my controlled substances.  I agree to waive any applicable privilege or right of privacy or confidentiality with respect to these authorizations. 7. PROVIDERS RIGHT TO MONITOR FOR SAFETY: PRESCRIPTION MONITORING / DRUG TESTING   I consent to drug/toxicology screening and will submit to a drug screen upon my providers request to assure I am only taking the prescribed drugs for my safety monitoring. I understand that a drug screen is a laboratory test in which a sample of my urine, blood or saliva is checked to see what drugs I have been taking. This may entail an observed urine specimen, which means that a nurse or other health care provider may watch me provide urine, and I will cooperate if I am asked to provide an observed specimen.  I understand that my provider will check a copy of my State Prescription Monitoring Program () Report in order to safely prescribe medications.  Pill Counts: I consent to pill counts when requested.   I may be asked to bring all my prescribed controlled substance medications, in their original bottles, to all of my scheduled appointments. In addition, my provider may ask me to come to the practice at any time for a random pill count. 8. TERMINATION OF THIS AGREEMENT  For my safety, my prescriber has the right to stop prescribing controlled substance medications and may end this agreement. Initials:_______   Conditions that may result in termination of this agreement:  a. I do not show any improvement in pain, or my activity has not improved. b. I develop rapid tolerance or loss of improvement, as described in my treatment plan.  c. I develop significant side effects from the medication. d. My behavior is not consistent with the responsibilities outlined above, thereby causing safety concerns to continue prescribing controlled substance medications. e. I fail to follow the terms of this agreement. f. Other:____________________________       UNDERSTANDING THIS MEDICATION AGREEMENT:    I have read the above and have had all my questions answered. For chronic disease management, I know that my symptoms can be managed with many types of treatments. A chronic medication trial may be part of my treatment, but I must be an active participant in my care. Medication therapy is only one part of my symptom management plan. In some cases, there may be limited scientific evidence to support the chronic use of certain medications to improve symptoms and daily function. Furthermore, in certain circumstances, there may be scientific information that suggests that the use of chronic controlled substances may worsen my symptoms and increase my risk of unintentional death directly related to this medication therapy. I know that if my provider feels my risk from controlled medications is greater than my benefit, I will have my controlled substance medication(s) compassionately lowered or removed altogether.      I further agree to allow this office to contact my HIPAA contact if there are concerns about my safety and use of the controlled medications. I have agreed to use the prescribed controlled substance medications to me as instructed by my provider and as stated in this Medication Agreement. My initial on each page and my signature below shows that I have read each page and I have had the opportunity to ask questions with answers provided by my provider.     Patient Name (Printed): _____________________________________  Patient Signature:  ______________________   Date: _____________    Prescriber Name (Printed): ___________________________________  Prescriber Signature: _____________________  Date: _____________

## 2022-12-21 NOTE — PROGRESS NOTES
[facility]  Clinic Note    Date: 12/21/2022                                               Subjective:     Chief Complaint   Patient presents with    Pain     6 MO PAIN FOLLOW UP     Other     PAIN AND LUMP NEXT TO LEFT BREAST, CARDIOLOGY STATED HEART IS NORMAL BUT RECOMMEND MRI FOR SPOT ON BREAST/RIB CANNOT DO MRI DUE TO IMPLANT      HPI  SMALL SWELLING UNDER LEFT ANTERIOR RIB  BIG TOES ACHING LATELY - BILATERAL - USING NAPROXEN  HX OF HIGH URIC ACID/ GOUT  NAPROXEN HELPED REDUCE SWELLING IN CHEST BUT STILL PAIN - TURNING AND BRINGING LEFT ARM BEHIND HIM - SHARP INTENSE PAIN  BONE  Low oxygen levels - \"borderline\" for oxygen  Tries to take deep breaths  Bad oa in toes/ hands/ fingers as well   Right hip oa limits walking - helps to use shopping cart - deep ache  The greatest pain is in left shoulder and now in left chest.  Uses tramadol only when pain severe o/w uses aleve but not on regular basis  Not using nebulizer often as no trouble breathing recently - neb helps when sob  Takes aleve 220 prn - doesn't upset stomach which occ 440 does.   Icy hot on shoulder helps  BP Readings from Last 3 Encounters:   12/21/22 122/76   11/21/22 128/62   12/01/21 132/70     Pulse Readings from Last 3 Encounters:   12/21/22 87   11/21/22 88   12/01/21 91     Wt Readings from Last 3 Encounters:   12/21/22 206 lb (93.4 kg)   11/21/22 206 lb (93.4 kg)   12/01/21 219 lb 8 oz (99.6 kg)            Patient Active Problem List    Diagnosis Date Noted    Chronic heart failure with preserved ejection fraction ([address] 11/21/2022    SOB (shortness of breath) 10/05/2011    Benign essential hypertension     Primary osteoarthritis involving multiple joints 02/09/2022    Paroxysmal atrial tachycardia (HCC) 04/12/2021    KEM on CPAP 04/12/2021    Chest pain 04/10/2021    Hearing disorder, sensorineural 11/02/2016    COPD, mild ([address] 06/13/2016    Diaphragm paralysis 06/13/2016    Intercostal pain 06/13/2016    Chronic hypoxemic respiratory failure ([facility] 06/13/2016    Allergic sinusitis 10/29/2013    Nose dryness 10/29/2013    Cramps, extremity     Elevated prostate specific antigen (PSA)     Cervical back pain with evidence of disc disease      Past Medical History:   Diagnosis Date    Afib ([facility]     Anxiety     Atrial fibrillation (HCC)     Benign essential hypertension     Cervical back pain with evidence of disc disease     Cramps, extremity     Elevated prostate specific antigen (PSA)     history of    Emphysema     Hypercholesteremia     Kidney stones      Past Surgical History:   Procedure Laterality Date    APPENDECTOMY      CARPAL TUNNEL RELEASE Bilateral     COCHLEAR IMPLANT  2017    bilat -service related - done at [address]       cataract with lens implant    HERNIA REPAIR      rt side    KNEE ARTHROSCOPY Bilateral     THROAT SURGERY      uvula resected, and nasal surgery for sleep apnea    TURP  2007    [facility] Outpatient Visit on 11/15/2021   Component Date Value Ref Range Status    Sodium 11/15/2021 143  136 - 145 mmol/L Final    Potassium 11/15/2021 4.3  3.5 - 5.1 mmol/L Final    Chloride 11/15/2021 102  99 - 110 mmol/L Final    CO2 11/15/2021 27  21 - 32 mmol/L Final    Anion Gap 11/15/2021 14  3 - 16 Final    Glucose 11/15/2021 102 (A)  70 - 99 mg/dL Final    BUN 11/15/2021 25 (A)  7 - 20 mg/dL Final    Creatinine 11/15/2021 1.0  0.8 - 1.3 mg/dL Final    GFR Non- 11/15/2021 >60  >60 Final    Comment: >60 mL/min/1.73m2 EGFR, calc. for ages 25 and older using the  MDRD formula (not corrected for weight), is valid for stable  renal function. GFR  11/15/2021 >60  >60 Final    Comment: Chronic Kidney Disease: less than 60 ml/min/1.73 sq.m. Kidney Failure: less than 15 ml/min/1.73 sq.m. Results valid for patients 18 years and older.       Calcium 11/15/2021 9.4  8.3 - 10.6 mg/dL Final    Total Protein 11/15/2021 7.1  6.4 - 8.2 g/dL Final Albumin 11/15/2021 4.3  3.4 - 5.0 g/dL Final    Albumin/Globulin Ratio 11/15/2021 1.5  1.1 - 2.2 Final    Total Bilirubin 11/15/2021 0.7  0.0 - 1.0 mg/dL Final    Alkaline Phosphatase 11/15/2021 54  40 - 129 U/L Final    ALT 11/15/2021 14  10 - 40 U/L Final    AST 11/15/2021 13 (A)  15 - 37 U/L Final    WBC 11/15/2021 5.3  4.0 - 11.0 K/uL Final    RBC 11/15/2021 4.42  4.20 - 5.90 M/uL Final    Hemoglobin 11/15/2021 13.6  13.5 - 17.5 g/dL Final    Hematocrit 11/15/2021 41.7  40.5 - 52.5 % Final    MCV 11/15/2021 94.4  80.0 - 100.0 fL Final    MCH 11/15/2021 30.8  26.0 - 34.0 pg Final    MCHC 11/15/2021 32.7  31.0 - 36.0 g/dL Final    RDW 11/15/2021 14.1  12.4 - 15.4 % Final    Platelets 56/47/3983 200  135 - 450 K/uL Final    MPV 11/15/2021 8.7  5.0 - 10.5 fL Final    Neutrophils % 11/15/2021 56.6  % Final    Lymphocytes % 11/15/2021 25.1  % Final    Monocytes % 11/15/2021 14.2  % Final    Eosinophils % 11/15/2021 3.1  % Final    Basophils % 11/15/2021 1.0  % Final    Neutrophils Absolute 11/15/2021 3.0  1.7 - 7.7 K/uL Final    Lymphocytes Absolute 11/15/2021 1.3  1.0 - 5.1 K/uL Final    Monocytes Absolute 11/15/2021 0.7  0.0 - 1.3 K/uL Final    Eosinophils Absolute 11/15/2021 0.2  0.0 - 0.6 K/uL Final    Basophils Absolute 11/15/2021 0.1  0.0 - 0.2 K/uL Final    Cholesterol, Total 11/15/2021 149  0 - 199 mg/dL Final    Triglycerides 11/15/2021 93  0 - 150 mg/dL Final    HDL 11/15/2021 48  40 - 60 mg/dL Final    LDL Calculated 11/15/2021 82  <100 mg/dL Final    VLDL Cholesterol Calculated 11/15/2021 19  Not Established mg/dL Final     Family History   Problem Relation Age of Onset    Stroke Father 68     Current Outpatient Medications   Medication Sig Dispense Refill    potassium chloride (KLOR-CON M) 10 MEQ extended release tablet TAKE 1 TABLET BY MOUTH DAILY 90 tablet 1    furosemide (LASIX) 20 MG tablet TAKE 1 TABLET BY MOUTH DAILY 90 tablet 3    telmisartan-hydroCHLOROthiazide (MICARDIS HCT) 80-25 MG per tablet TAKE 1 TABLET BY MOUTH DAILY 90 tablet 1    dilTIAZem (CARDIZEM 12 HR) 60 MG extended release capsule TAKE 1 CAPSULE BY MOUTH TWICE DAILY 90 capsule 3    pravastatin (PRAVACHOL) 40 MG tablet TAKE 1 TABLET BY MOUTH EVERY OTHER DAY 45 tablet 1    traMADol (ULTRAM) 50 MG tablet Take 1 tablet by mouth every 8 hours as needed for Pain for up to 90 days. 90 tablet 0    ipratropium-albuterol (DUONEB) 0.5-2.5 (3) MG/3ML SOLN nebulizer solution INHALE 3 MLS INTO THE LUNGS USING NEBULIZER EVERY 4 HOURS 360 mL 0    NONFORMULARY Take 80 mg by mouth as needed Uric      probenecid (BENEMID) 500 MG tablet Take 500 mg by mouth 2 times daily as needed      naproxen (NAPROSYN) 500 MG tablet Take 500 mg by mouth 2 times daily (with meals)      PROAIR  (90 Base) MCG/ACT inhaler Inhale 2 puffs into the lungs every 6 hours as needed for Wheezing 1 Inhaler 11    vitamin D 25 MCG (1000 UT) CAPS Take 1 capsule by mouth nightly      ipratropium-albuterol (DUONEB) 0.5-2.5 (3) MG/3ML SOLN nebulizer solution Take 3 mLs by nebulization every 6 hours as needed for Shortness of Breath. 360 mL 1    latanoprost (XALATAN) 0.005 % ophthalmic solution Place 1 drop into both eyes nightly. No current facility-administered medications for this visit. Allergies   Allergen Reactions    Codeine      headache    Timolol Maleate      Short of breath       Review of Systems    Objective:  /76 (Site: Left Upper Arm, Position: Sitting, Cuff Size: Medium Adult)   Pulse 87   Ht 5' 4\" (1.626 m)   Wt 206 lb (93.4 kg)   SpO2 95%   BMI 35.36 kg/m²     BP Readings from Last 3 Encounters:   12/21/22 122/76   11/21/22 128/62   12/01/21 132/70       Pulse Readings from Last 3 Encounters:   12/21/22 87   11/21/22 88   12/01/21 91       Wt Readings from Last 3 Encounters:   12/21/22 206 lb (93.4 kg)   11/21/22 206 lb (93.4 kg)   12/01/21 219 lb 8 oz (99.6 kg)       Physical Exam  Constitutional:       General: He is not in acute distress. Appearance: He is well-developed. Eyes:      General: No scleral icterus. Conjunctiva/sclera: Conjunctivae normal.   Cardiovascular:      Rate and Rhythm: Normal rate and regular rhythm. Heart sounds: Normal heart sounds. No murmur heard. No gallop. Pulmonary:      Effort: Pulmonary effort is normal. No respiratory distress. Breath sounds: No wheezing, rhonchi or rales. Comments: Decreased heart sounds  Abdominal:      General: Bowel sounds are normal. There is no distension. Palpations: Abdomen is soft. Tenderness: There is no abdominal tenderness. Musculoskeletal:         General: No swelling. Comments: Madhuri/ haberdeen nodules fingers  Prominent acromion left shoulder w/ slightly reduced rom  Slight prominence left lower costal margin w/o obvious mass and no ttp     Skin:     Findings: No erythema or rash. Neurological:      Mental Status: He is alert. Assessment/Plan:      Diagnosis Orders   1. Nodule of left anterior chest wall        2. Severe obesity (BMI 35.0-39. 9) with comorbidity ([address]        3. Cervical back pain with evidence of disc disease        4. COPD, mild ([address]        5. Diaphragm paralysis        6. Chronic hypoxemic respiratory failure (HCC)        7. Sensorineural hearing loss (SNHL), unspecified laterality        8. KEM on CPAP        9. Primary osteoarthritis involving multiple joints        10. Paroxysmal atrial tachycardia ([address]        11.  Chronic heart failure with preserved ejection fraction (HCC)        Tylenol pm at night  Bp stable on micardis hct/ diltiazem 60 bid for bp/ PAT  On albuterol/ duoneb prn  Pravastatin 40 daily   On lasix/ kcl recently  Cardio note - dr. Jordon Vela reviewed 11/21 -   F/u pulm routinely - on oxygen at night and prn per dr. Justin Vazquez reviewed and results c/w rx'd meds  Tramadol #90 filled 10/19 - uses every few months prn severe pain   Tolerates well/ no concerns - working well  uds very soon  [employer] reviewed 6/22 - gfr 54  Needs med agreement  Check bmp/ uric acid  Macrina Bai MD, MD  12/21/2022  11:00 AM

## 2023-01-11 ENCOUNTER — HOSPITAL ENCOUNTER (OUTPATIENT)
Dept: CT IMAGING | Age: 88
Discharge: HOME | End: 2023-01-11
Payer: MEDICARE

## 2023-01-11 DIAGNOSIS — R22.2 NODULE OF LEFT ANTERIOR CHEST WALL: ICD-10-CM

## 2023-01-11 DIAGNOSIS — R07.81 RIB PAIN ON LEFT SIDE: ICD-10-CM

## 2023-01-11 PROCEDURE — 71250 CT THORAX DX C-: CPT

## 2023-01-12 ENCOUNTER — TELEPHONE (OUTPATIENT)
Dept: FAMILY MEDICINE CLINIC | Age: 88
End: 2023-01-12

## 2023-01-12 NOTE — TELEPHONE ENCOUNTER
----- Message from Shena Neville sent at 1/12/2023  2:14 PM EST -----  Subject: Results Request    QUESTIONS  Results:  [facility] CT SCAN; Ordered by: Mary Ellen Magdaleno   Date Performed: 2023-01-11  ---------------------------------------------------------------------------  --------------  Theresa COCHRAN    [phone]; OK to leave message on voicemail  ---------------------------------------------------------------------------  --------------

## 2023-02-21 ENCOUNTER — OFFICE VISIT (OUTPATIENT)
Dept: FAMILY MEDICINE CLINIC | Age: 88
End: 2023-02-21

## 2023-02-21 VITALS
HEIGHT: 64 IN | BODY MASS INDEX: 35.68 KG/M2 | HEART RATE: 79 BPM | DIASTOLIC BLOOD PRESSURE: 82 MMHG | SYSTOLIC BLOOD PRESSURE: 120 MMHG | OXYGEN SATURATION: 98 % | WEIGHT: 209 LBS

## 2023-02-21 DIAGNOSIS — I47.1 PAROXYSMAL ATRIAL TACHYCARDIA (HCC): ICD-10-CM

## 2023-02-21 DIAGNOSIS — E79.0 HYPERURICEMIA: ICD-10-CM

## 2023-02-21 DIAGNOSIS — I20.8 OTHER FORMS OF ANGINA PECTORIS (HCC): ICD-10-CM

## 2023-02-21 DIAGNOSIS — G47.00 INSOMNIA, UNSPECIFIED TYPE: ICD-10-CM

## 2023-02-21 DIAGNOSIS — R97.20 ELEVATED PROSTATE SPECIFIC ANTIGEN (PSA): ICD-10-CM

## 2023-02-21 DIAGNOSIS — M15.9 PRIMARY OSTEOARTHRITIS INVOLVING MULTIPLE JOINTS: ICD-10-CM

## 2023-02-21 DIAGNOSIS — I10 BENIGN ESSENTIAL HYPERTENSION: ICD-10-CM

## 2023-02-21 DIAGNOSIS — Z00.00 INITIAL MEDICARE ANNUAL WELLNESS VISIT: Primary | ICD-10-CM

## 2023-02-21 DIAGNOSIS — I50.32 CHRONIC HEART FAILURE WITH PRESERVED EJECTION FRACTION (HCC): ICD-10-CM

## 2023-02-21 DIAGNOSIS — E66.01 SEVERE OBESITY (BMI 35.0-39.9) WITH COMORBIDITY (HCC): ICD-10-CM

## 2023-02-21 DIAGNOSIS — H91.93 BILATERAL HEARING LOSS, UNSPECIFIED HEARING LOSS TYPE: ICD-10-CM

## 2023-02-21 DIAGNOSIS — J44.9 COPD, MILD (HCC): ICD-10-CM

## 2023-02-21 DIAGNOSIS — J98.6 DIAPHRAGM PARALYSIS: ICD-10-CM

## 2023-02-21 DIAGNOSIS — H04.123 DRY EYES: ICD-10-CM

## 2023-02-21 PROBLEM — G47.33 OSA ON CPAP: Status: RESOLVED | Noted: 2021-04-12 | Resolved: 2023-02-21

## 2023-02-21 PROBLEM — Z99.89 OSA ON CPAP: Status: RESOLVED | Noted: 2021-04-12 | Resolved: 2023-02-21

## 2023-02-21 RX ORDER — IPRATROPIUM BROMIDE AND ALBUTEROL SULFATE 2.5; .5 MG/3ML; MG/3ML
SOLUTION RESPIRATORY (INHALATION)
Qty: 360 ML | Refills: 3 | Status: SHIPPED | OUTPATIENT
Start: 2023-02-21

## 2023-02-21 SDOH — ECONOMIC STABILITY: HOUSING INSECURITY
IN THE LAST 12 MONTHS, WAS THERE A TIME WHEN YOU DID NOT HAVE A STEADY PLACE TO SLEEP OR SLEPT IN A SHELTER (INCLUDING NOW)?: NO

## 2023-02-21 SDOH — ECONOMIC STABILITY: FOOD INSECURITY: WITHIN THE PAST 12 MONTHS, THE FOOD YOU BOUGHT JUST DIDN'T LAST AND YOU DIDN'T HAVE MONEY TO GET MORE.: NEVER TRUE

## 2023-02-21 SDOH — ECONOMIC STABILITY: INCOME INSECURITY: HOW HARD IS IT FOR YOU TO PAY FOR THE VERY BASICS LIKE FOOD, HOUSING, MEDICAL CARE, AND HEATING?: NOT HARD AT ALL

## 2023-02-21 SDOH — ECONOMIC STABILITY: FOOD INSECURITY: WITHIN THE PAST 12 MONTHS, YOU WORRIED THAT YOUR FOOD WOULD RUN OUT BEFORE YOU GOT MONEY TO BUY MORE.: NEVER TRUE

## 2023-02-21 ASSESSMENT — PATIENT HEALTH QUESTIONNAIRE - PHQ9
SUM OF ALL RESPONSES TO PHQ9 QUESTIONS 1 & 2: 0
SUM OF ALL RESPONSES TO PHQ QUESTIONS 1-9: 0
2. FEELING DOWN, DEPRESSED OR HOPELESS: 0
SUM OF ALL RESPONSES TO PHQ QUESTIONS 1-9: 0
1. LITTLE INTEREST OR PLEASURE IN DOING THINGS: 0
SUM OF ALL RESPONSES TO PHQ QUESTIONS 1-9: 0
SUM OF ALL RESPONSES TO PHQ QUESTIONS 1-9: 0

## 2023-02-21 ASSESSMENT — LIFESTYLE VARIABLES: HOW OFTEN DO YOU HAVE A DRINK CONTAINING ALCOHOL: NEVER

## 2023-02-21 NOTE — PATIENT INSTRUCTIONS
Preventing Falls: Care Instructions  Overview     Getting around your home safely can be a challenge if you have injuries or health problems that make it easy for you to fall. Loose rugs and furniture in walkways are among the dangers for many older people who have problems walking or who have poor eyesight. People who have conditions such as arthritis, osteoporosis, or dementia also have to be careful not to fall. You can make your home safer with a few simple measures. Follow-up care is a key part of your treatment and safety. Be sure to make and go to all appointments, and call your doctor if you are having problems. It's also a good idea to know your test results and keep a list of the medicines you take. How can you care for yourself at home? Taking care of yourself  Exercise regularly to improve your strength, muscle tone, and balance. Walk if you can. Swimming may be a good choice if you cannot walk easily. Have your vision and hearing checked each year or any time you notice a change. If you have trouble seeing and hearing, you might not be able to avoid objects and could lose your balance. Know the side effects of the medicines you take. Ask your doctor or pharmacist whether the medicines you take can affect your balance. Sleeping pills or sedatives can affect your balance. Limit the amount of alcohol you drink. Alcohol can impair your balance and other senses. Ask your doctor whether calluses or corns on your feet need to be removed. If you wear loose-fitting shoes because of calluses or corns, you can lose your balance and fall. Talk to your doctor if you have numbness in your feet. You may get dizzy if you do not drink enough water. To prevent dehydration, drink plenty of fluids. Choose water and other clear liquids. If you have kidney, heart, or liver disease and have to limit fluids, talk with your doctor before you increase the amount of fluids you drink.   Preventing falls at home  Remove raised doorway thresholds, throw rugs, and clutter. Repair loose carpet or raised areas in the floor. Move furniture and electrical cords to keep them out of walking paths. Use nonskid floor wax, and wipe up spills right away, especially on ceramic tile floors. If you use a walker or cane, put rubber tips on it. If you use crutches, clean the bottoms of them regularly with an abrasive pad, such as steel wool. Keep your house well lit, especially stairways, porches, and outside walkways. Use night-lights in areas such as hallways and bathrooms. Add extra light switches or use remote switches (such as switches that go on or off when you clap your hands) to make it easier to turn lights on if you have to get up during the night. Install sturdy handrails on stairways. Move items in your cabinets so that the things you use a lot are on the lower shelves (about waist level). Keep a cordless phone and a flashlight with new batteries by your bed. If possible, put a phone in each of the main rooms of your house, or carry a cell phone in case you fall and cannot reach a phone. Or, you can wear a device around your neck or wrist. You push a button that sends a signal for help. Wear low-heeled shoes that fit well and give your feet good support. Use footwear with nonskid soles. Check the heels and soles of your shoes for wear. Repair or replace worn heels or soles. Do not wear socks without shoes on smooth floors, such as wood. Walk on the grass when the sidewalks are slippery. If you live in an area that gets snow and ice in the winter, sprinkle salt on slippery steps and sidewalks. Or ask a family member or friend to do this for you. Preventing falls in the bath  Install grab bars and nonskid mats inside and outside your shower or tub and near the toilet and sinks. Use shower chairs and bath benches. Use a hand-held shower head that will allow you to sit while showering.   Get into a tub or shower by putting the weaker leg in first. Get out of a tub or shower with your strong side first.  Repair loose toilet seats and consider installing a raised toilet seat to make getting on and off the toilet easier. Keep your bathroom door unlocked while you are in the shower. Where can you learn more? Go to http://www.hinton.com/ and enter G117 to learn more about \"Preventing Falls: Care Instructions. \"  Current as of: May 4, 2022               Content Version: 13.5  © 4936-2166 Healthwise, Incorporated. Care instructions adapted under license by [facility] ([facility]). If you have questions about a medical condition or this instruction, always ask your healthcare professional. [address] any warranty or liability for your use of this information. For more information on your local Area Agency on Aging or [place] on Aging please visit the appropriate web site below:    [place]: MobileCycles.pl    [facility]: https://aging. [place].gov/    [place]: https://aging.[place].gov/    [place]: InsuranceSquad.es           Hearing Loss: Care Instructions  Overview     Hearing loss is a sudden or slow decrease in how well you hear. It can range from mild to severe. Permanent hearing loss can occur with aging. It also can happen when you are exposed long-term to loud noise. Examples include listening to loud music, riding motorcycles, or being around other loud machines. Hearing loss can affect your work and home life. It can make you feel lonely or depressed. You may feel that you have lost your independence. But hearing aids and other devices can help you hear better and feel connected to others. Follow-up care is a key part of your treatment and safety. Be sure to make and go to all appointments, and call your doctor if you are having problems. It's also a good idea to know your test results and keep a list of the medicines you take.   How can you care for yourself at home? Avoid loud noises whenever possible. This helps keep your hearing from getting worse. Always wear hearing protection around loud noises. Wear a hearing aid as directed. See a professional who can help you pick a hearing aid that fits you. Have hearing tests as your doctor suggests. They can show whether your hearing has changed. Your hearing aid may need to be adjusted. Use other devices as needed. These may include:  Telephone amplifiers and hearing aids that can connect to a television, stereo, radio, or microphone. Devices that use lights or vibrations. These alert you to the doorbell, a ringing telephone, or a baby monitor. Television closed-captioning. This shows the words at the bottom of the screen. Most new TVs can do this. TTY (text telephone). This lets you type messages back and forth on the telephone instead of talking or listening. These devices are also called TDD. When messages are typed on the keyboard, they are sent over the phone line to a receiving TTY. The message is shown on a monitor. Use text messaging, social media, and email if it is hard for you to communicate by telephone. Try to learn a listening technique called speechreading. It is not lipreading. You pay attention to people's gestures, expressions, posture, and tone of voice. These clues can help you understand what a person is saying. Face the person you are talking to, and have them face you. Make sure the lighting is good. You need to see the other person's face clearly. Think about counseling if you need help to adjust to your hearing loss. When should you call for help? Watch closely for changes in your health, and be sure to contact your doctor if:    You think your hearing is getting worse.     You have new symptoms, such as dizziness or nausea. Where can you learn more? Go to http://www.hinton.com/ and enter J103 to learn more about \"Hearing Loss: Care Instructions. \"  Current as of: May 4, 2022               Content Version: 13.5  © 4997-2085 Healthwise, [employer]id[employer]. Care instructions adapted under license by [facility] ([facility]). If you have questions about a medical condition or this instruction, always ask your healthcare professional. [address] any warranty or liability for your use of this information. Advance Directives: Care Instructions  Overview  An advance directive is a legal way to state your wishes at the end of your life. It tells your family and your doctor what to do if you can't say what you want. There are two main types of advance directives. You can change them any time your wishes change. Living will. This form tells your family and your doctor your wishes about life support and other treatment. The form is also called a declaration. Medical power of . This form lets you name a person to make treatment decisions for you when you can't speak for yourself. This person is called a health care agent (health care proxy, health care surrogate). The form is also called a durable power of  for health care. If you do not have an advance directive, decisions about your medical care may be made by a family member, or by a doctor or a  who doesn't know you. It may help to think of an advance directive as a gift to the people who care for you. If you have one, they won't have to make tough decisions by themselves. For more information, including forms for your state, see the [address] [facility] website (www.caringinfo.org/planning/advance-directives/). Follow-up care is a key part of your treatment and safety. Be sure to make and go to all appointments, and call your doctor if you are having problems. It's also a good idea to know your test results and keep a list of the medicines you take. What should you include in an advance directive? Many states have a unique advance directive form.  (It may ask you to address specific issues.) Or you might use a universal form that's approved by many states. If your form doesn't tell you what to address, it may be hard to know what to include in your advance directive. Use the questions below to help you get started. Who do you want to make decisions about your medical care if you are not able to? What life-support measures do you want if you have a serious illness that gets worse over time or can't be cured? What are you most afraid of that might happen? (Maybe you're afraid of having pain, losing your independence, or being kept alive by machines.)  Where would you prefer to die? (Your home? A hospital? A nursing home?)  Do you want to donate your organs when you die? Do you want certain Jain practices performed before you die? When should you call for help? Be sure to contact your doctor if you have any questions. Where can you learn more? Go to http://www.hinton.com/ and enter R264 to learn more about \"Advance Directives: Care Instructions. \"  Current as of: June 16, 2022               Content Version: 13.5  © 4302-1400 Healthwise, Incorporated. Care instructions adapted under license by [facility] ([facility]). If you have questions about a medical condition or this instruction, always ask your healthcare professional. [address] any warranty or liability for your use of this information. Starting a Weight Loss Plan: Care Instructions  Overview     If you're thinking about losing weight, it can be hard to know where to start. Your doctor can help you set up a weight loss plan that best meets your needs. You may want to take a class on nutrition or exercise, or you could join a weight loss support group. If you have questions about how to make changes to your eating or exercise habits, ask your doctor about seeing a registered dietitian or an exercise specialist.  It can be a big challenge to lose weight.  But you don't have to make huge changes at once. Make small changes, and stick with them. When those changes become habit, add a few more changes. If you don't think you're ready to make changes right now, try to pick a date in the future. Make an appointment to see your doctor to discuss whether the time is right for you to start a plan. Follow-up care is a key part of your treatment and safety. Be sure to make and go to all appointments, and call your doctor if you are having problems. It's also a good idea to know your test results and keep a list of the medicines you take. How can you care for yourself at home? Set realistic goals. Many people expect to lose much more weight than is likely. A weight loss of 5% to 10% of your body weight may be enough to improve your health. Get family and friends involved to provide support. Talk to them about why you are trying to lose weight, and ask them to help. They can help by participating in exercise and having meals with you, even if they may be eating something different. Find what works best for you. If you do not have time or do not like to cook, a program that offers meal replacement bars or shakes may be better for you. Or if you like to prepare meals, finding a plan that includes daily menus and recipes may be best.  Ask your doctor about other health professionals who can help you achieve your weight loss goals. A dietitian can help you make healthy changes in your diet. An exercise specialist or  can help you develop a safe and effective exercise program.  A counselor or psychiatrist can help you cope with issues such as depression, anxiety, or family problems that can make it hard to focus on weight loss. Consider joining a support group for people who are trying to lose weight. Your doctor can suggest groups in your area. Where can you learn more?   Go to http://www.woods.com/ and enter U357 to learn more about \"Starting a Weight Loss Plan: Care Instructions. \"  Current as of: August 25, 2022               Content Version: 13.5  © 4106-0254 Teez.by. Care instructions adapted under license by [facility] [address] If you have questions about a medical condition or this instruction, always ask your healthcare professional. [address] any warranty or liability for your use of this information. A Healthy Heart: Care Instructions  Your Care Instructions     Coronary artery disease, also called heart disease, occurs when a substance called plaque builds up in the vessels that supply oxygen-rich blood to your heart muscle. This can narrow the blood vessels and reduce blood flow. A heart attack happens when blood flow is completely blocked. A high-fat diet, smoking, and other factors increase the risk of heart disease. Your doctor has found that you have a chance of having heart disease. You can do lots of things to keep your heart healthy. It may not be easy, but you can change your diet, exercise more, and quit smoking. These steps really work to lower your chance of heart disease. Follow-up care is a key part of your treatment and safety. Be sure to make and go to all appointments, and call your doctor if you are having problems. It's also a good idea to know your test results and keep a list of the medicines you take. How can you care for yourself at home? Diet    Use less salt when you cook and eat. This helps lower your blood pressure. Taste food before salting. Add only a little salt when you think you need it. With time, your taste buds will adjust to less salt.     Eat fewer snack items, fast foods, canned soups, and other high-salt, high-fat, processed foods.     Read food labels and try to avoid saturated and trans fats. They increase your risk of heart disease by raising cholesterol levels.     Limit the amount of solid fat-butter, margarine, and shortening-you eat.  Use olive, peanut, or canola oil when you cook. Bake, broil, and steam foods instead of frying them.     Eat a variety of fruit and vegetables every day. Dark green, deep orange, red, or yellow fruits and vegetables are especially good for you. Examples include spinach, carrots, peaches, and berries.     Foods high in fiber can reduce your cholesterol and provide important vitamins and minerals. High-fiber foods include whole-grain cereals and breads, oatmeal, beans, brown rice, citrus fruits, and apples.     Eat lean proteins. Heart-healthy proteins include seafood, lean meats and poultry, eggs, beans, peas, nuts, seeds, and soy products.     Limit drinks and foods with added sugar. These include candy, desserts, and soda pop. Lifestyle changes    If your doctor recommends it, get more exercise. Walking is a good choice. Bit by bit, increase the amount you walk every day. Try for at least 30 minutes on most days of the week. You also may want to swim, bike, or do other activities.     Do not smoke. If you need help quitting, talk to your doctor about stop-smoking programs and medicines. These can increase your chances of quitting for good. Quitting smoking may be the most important step you can take to protect your heart. It is never too late to quit.     Limit alcohol to 2 drinks a day for men and 1 drink a day for women. Too much alcohol can cause health problems.     Manage other health problems such as diabetes, high blood pressure, and high cholesterol. If you think you may have a problem with alcohol or drug use, talk to your doctor. Medicines    Take your medicines exactly as prescribed. Call your doctor if you think you are having a problem with your medicine.     If your doctor recommends aspirin, take the amount directed each day. Make sure you take aspirin and not another kind of pain reliever, such as acetaminophen (Tylenol). When should you call for help? Call 911 if you have symptoms of a heart attack.  These may include:    Chest pain or pressure, or a strange feeling in the chest.     Sweating.     Shortness of breath.     Pain, pressure, or a strange feeling in the back, neck, jaw, or upper belly or in one or both shoulders or arms.     Lightheadedness or sudden weakness.     A fast or irregular heartbeat. After you call 911, the  may tell you to chew 1 adult-strength or 2 to 4 low-dose aspirin. Wait for an ambulance. Do not try to drive yourself. Watch closely for changes in your health, and be sure to contact your doctor if you have any problems. Where can you learn more? Go to http://www.hinton.com/ and enter F075 to learn more about \"A Healthy Heart: Care Instructions. \"  Current as of: September 7, 2022               Content Version: 13.5  © 9264-5336 Healthwise, Dealer.[employer]. Care instructions adapted under license by [address] [place] [address]. If you have questions about a medical condition or this instruction, always ask your healthcare professional. [place] [address] any warranty or liability for your use of this information. Personalized Preventive Plan for Darcy Hewitt - 2/21/2023  Medicare offers a range of preventive health benefits. Some of the tests and screenings are paid in full while other may be subject to a deductible, co-insurance, and/or copay. Some of these benefits include a comprehensive review of your medical history including lifestyle, illnesses that may run in your family, and various assessments and screenings as appropriate. After reviewing your medical record and screening and assessments performed today your provider may have ordered immunizations, labs, imaging, and/or referrals for you. A list of these orders (if applicable) as well as your Preventive Care list are included within your After Visit Summary for your review.     Other Preventive Recommendations:    A preventive eye exam performed by an eye specialist is recommended every 1-2 years to screen for glaucoma; cataracts, macular degeneration, and other eye disorders. A preventive dental visit is recommended every 6 months. Try to get at least 150 minutes of exercise per week or 10,000 steps per day on a pedometer . Order or download the FREE \"Exercise & Physical Activity: Your Everyday Guide\" from [employer]. Call 3-467.111.7898 or search [employer] online. You need 4179-3611 mg of calcium and 7055-7468 IU of vitamin D per day. It is possible to meet your calcium requirement with diet alone, but a vitamin D supplement is usually necessary to meet this goal.  When exposed to the sun, use a sunscreen that protects against both UVA and UVB radiation with an SPF of 30 or greater. Reapply every 2 to 3 hours or after sweating, drying off with a towel, or swimming. Always wear a seat belt when traveling in a car. Always wear a helmet when riding a bicycle or motorcycle.

## 2023-02-21 NOTE — PROGRESS NOTES
Medicare Annual Wellness Visit    [id number] [facility] is here for Medicare AWV (MEDICARE AWV)    Assessment & Plan       Diagnosis Orders   1. Initial Medicare annual wellness visit        2. Dry eyes        3. Severe obesity (BMI 35.0-39. 9) with comorbidity ([address]        4. COPD, mild ([address]  ipratropium-albuterol (DUONEB) 0.5-2.5 (3) MG/3ML SOLN nebulizer solution      5. Chronic heart failure with preserved ejection fraction (HCC)        6. Paroxysmal atrial tachycardia ([address]        7. Bilateral hearing loss, unspecified hearing loss type        8. Diaphragm paralysis        9. Benign essential hypertension        10. Elevated prostate specific antigen (PSA)        11. Other forms of angina pectoris ([address]        12. Primary osteoarthritis involving multiple joints        13. Hyperuricemia          Aleve prn w/ gi protection  Diet d/w pt  Wants to hold on taking allopurinol for now  Nebs q4 - refills sent - cont oxygen  Rx for new concentrator and ambulatory oxygen tank  And rx for battery operated nebulizer  Form completed for [employer] to restore electrical services 2/2 med equipment  F/u urology in past  F/u cardio routinely for PAT  Gtts for eyes -dryness - f/u eye doctor. Cont diltiazem per cardio  On statin  Reviewed labs 12/21 and CT scan w/ left hemidiaphragm elevation  F/u 6 months - Fernald pe in May  Recommendations for Preventive Services Due: see orders and patient instructions/AVS.  Recommended screening schedule for the next 5-10 years is provided to the patient in written form: see Patient Instructions/AVS.          Subjective   Driving still  Checks on [place] and [employer] [place]  Goes to [facility], doctor, [employer]  No recent infection / cold  Hearing very bad  Glasses for vision  Lost power 13 hours last week - couldn't get oxygen or nebulizer  Sleeps w/ 3 liters at night oxygen - has battery operated  oxygen monitor through day - intermittent oxygen -not as effective.   Sees pulmonology -dr. Ilene Casillas deep breaths can help  Pain left chest wall w/ reaching around - catches - but massage on chest seems to help  Has used cpap in past but not recently - wasn't able to get sleep eval done  No witnessed apnea or snoring according to wife  Gave cpap to preacher - hasn't used it  Needs form for [employer] and rx for oxygen concentrator. Generally does okay if takes deep breaths and uses neb q4 h - needs refills  Sees eye doctor next month - dry eyes - using saline gtts  [facility] Rx evaluation every 3 years  Aleve helping more than tramadol - stopped tamadol - eats w/ aleve - stomach tolerates this dose. Sleep not continuous but wakes, drinks decaf coffee and then goes back to bed again for couple more hours - up to 3-4 hours at a time. BP Readings from Last 3 Encounters:   02/21/23 120/82   12/21/22 122/76   11/21/22 128/62     Pulse Readings from Last 3 Encounters:   02/21/23 79   12/21/22 87   11/21/22 88     Wt Readings from Last 3 Encounters:   02/21/23 209 lb (94.8 kg)   12/21/22 206 lb (93.4 kg)   11/21/22 206 lb (93.4 kg)       Patient's complete Health Risk Assessment and screening values have been reviewed and are found in Flowsheets. The following problems were reviewed today and where indicated follow up appointments were made and/or referrals ordered.     Positive Risk Factor Screenings with Interventions:    Fall Risk:  Do you feel unsteady or are you worried about falling? : no  2 or more falls in past year?: (!) yes  Fall with injury in past year?: no     Interventions:    No concerns             Social and Emotional Support:  Do you get the social and emotional support that you need?: (!) No  Interventions:  Generally good    Weight and Activity:  Physical Activity: Insufficiently Active    Days of Exercise per Week: 3 days    Minutes of Exercise per Session: 10 min     On average, how many days per week do you engage in moderate to strenuous exercise (like a brisk walk)?: 3 days  Have you lost any weight without trying in the past 3 months?: No  Body mass index: (!) 35.87    Obesity Interventions:  Diet/ activity d/w pt    Takes supplement to help bm's       Hearing Screen:  Do you or your family notice any trouble with your hearing that hasn't been managed with hearing aids?: (!) Yes    Interventions:  Severe sees specialist for hearing                       Objective   Vitals:    02/21/23 1344   BP: 120/82   Site: Left Upper Arm   Position: Sitting   Cuff Size: Medium Adult   Pulse: 79   SpO2: 98%   Weight: 209 lb (94.8 kg)   Height: 5' 4\" (1.626 m)      Body mass index is 35.87 kg/m². Nad   Speaks loudly  No chest wall ttp  Cv rrr  Lungs cta  Abd soft nd/nt      Allergies   Allergen Reactions    Codeine      headache    Timolol Maleate      Short of breath     Prior to Visit Medications    Medication Sig Taking?  Authorizing Provider   potassium chloride (KLOR-CON M) 10 MEQ extended release tablet TAKE 1 TABLET BY MOUTH DAILY Yes Blossom Chaudhari, DO   furosemide (LASIX) 20 MG tablet TAKE 1 TABLET BY MOUTH DAILY Yes Blossom Chaudhari, DO   telmisartan-hydroCHLOROthiazide (MICARDIS HCT) 80-25 MG per tablet TAKE 1 TABLET BY MOUTH DAILY Yes Blossom Chaudhari, DO   dilTIAZem (CARDIZEM 12 HR) 60 MG extended release capsule TAKE 1 CAPSULE BY MOUTH TWICE DAILY Yes Blossom Chaudhari, DO   pravastatin (PRAVACHOL) 40 MG tablet TAKE 1 TABLET BY MOUTH EVERY OTHER DAY Yes Blossom Chaudhari, DO   ipratropium-albuterol (DUONEB) 0.5-2.5 (3) MG/3ML SOLN nebulizer solution INHALE 3 MLS INTO THE LUNGS USING NEBULIZER EVERY 4 HOURS Yes Kizzy Boyd MD   NONFORMULARY Take 80 mg by mouth as needed Uric Yes Historical Provider, MD   probenecid (BENEMID) 500 MG tablet Take 500 mg by mouth 2 times daily as needed Yes Historical Provider, MD   naproxen (NAPROSYN) 500 MG tablet Take 500 mg by mouth 2 times daily (with meals) Yes Historical Provider, MD   PROAIR  (90 Base) MCG/ACT inhaler Inhale 2 puffs into the lungs every 6 hours as needed for Wheezing Yes Kizzy Boyd MD   vitamin D 25 MCG (1000 UT) CAPS Take 1 capsule by mouth nightly Yes Historical Provider, MD   ipratropium-albuterol (DUONEB) 0.5-2.5 (3) MG/3ML SOLN nebulizer solution Take 3 mLs by nebulization every 6 hours as needed for Shortness of Breath. Yes Reina Eid MD   latanoprost (XALATAN) 0.005 % ophthalmic solution Place 1 drop into both eyes nightly.  Yes Historical Provider, MD Guerra (Including outside providers/suppliers regularly involved in providing care):   Patient Care Team:  Soheila Kuhn MD as PCP - General (Family Medicine)  Soheila Kuhn MD as PCP [facility]  Coleman Strickland MD as Consulting Physician (Otolaryngology)     Reviewed and updated this visit:  Tobacco  Allergies  Meds  Med Hx  Surg Hx  Soc Hx  Fam Hx             Soheila Kuhn MD

## 2023-03-01 ENCOUNTER — HOSPITAL ENCOUNTER (OUTPATIENT)
Age: 88
Discharge: HOME | End: 2023-03-01
Payer: MEDICARE

## 2023-03-01 DIAGNOSIS — I10 ESSENTIAL HYPERTENSION: ICD-10-CM

## 2023-03-01 LAB
ALBUMIN SERPL-MCNC: 4.2 G/DL (ref 3.4–5)
ANION GAP SERPL CALCULATED.3IONS-SCNC: 12 MMOL/L (ref 3–16)
BUN BLDV-MCNC: 29 MG/DL (ref 7–20)
CALCIUM SERPL-MCNC: 9.8 MG/DL (ref 8.3–10.6)
CHLORIDE BLD-SCNC: 100 MMOL/L (ref 99–110)
CO2: 29 MMOL/L (ref 21–32)
CREAT SERPL-MCNC: 1.2 MG/DL (ref 0.8–1.3)
GFR SERPL CREATININE-BSD FRML MDRD: 56 ML/MIN/{1.73_M2}
GLUCOSE BLD-MCNC: 101 MG/DL (ref 70–99)
PHOSPHORUS: 3.7 MG/DL (ref 2.5–4.9)
POTASSIUM SERPL-SCNC: 4.1 MMOL/L (ref 3.5–5.1)
SODIUM BLD-SCNC: 141 MMOL/L (ref 136–145)

## 2023-03-01 PROCEDURE — 80069 RENAL FUNCTION PANEL: CPT

## 2023-03-01 PROCEDURE — 36415 COLL VENOUS BLD VENIPUNCTURE: CPT

## 2023-03-06 ENCOUNTER — TELEPHONE (OUTPATIENT)
Dept: CARDIOLOGY CLINIC | Age: 88
End: 2023-03-06

## 2023-03-06 NOTE — TELEPHONE ENCOUNTER
----- Message from Logan Zaldivar RN sent at 3/6/2023  9:30 AM EST -----  Please let him know his labs look great, thanks

## 2023-06-01 DIAGNOSIS — M15.9 PRIMARY OSTEOARTHRITIS INVOLVING MULTIPLE JOINTS: ICD-10-CM

## 2023-06-01 RX ORDER — TRAMADOL HYDROCHLORIDE 50 MG/1
50 TABLET ORAL EVERY 6 HOURS PRN
Qty: 28 TABLET | Refills: 0 | Status: SHIPPED | OUTPATIENT
Start: 2023-06-01 | End: 2023-06-08

## 2023-06-07 NOTE — TELEPHONE ENCOUNTER
Received refill request for pravastatin (PRAVACHOL) 40 MG tablet, potassium chloride (KLOR-CON M) 10 MEQ extended release tablet, telmisartan-hydroCHLOROthiazide (MICARDIS HCT) 80-25 MG per tablet and dilTIAZem (CARDIZEM 12 HR) 60 MG extended release capsule from exsulin.      Last OV: 11- DKW    Next OV: 11- DKW    Last Labs: 12- BMP                    11- Lipid    Last Filled:  11- Prav DKW                      12-  Pot chlo  DKW                      12-8-2022 Micardis  DKW                      12-5-2022  Diltiazem DKW

## 2023-06-08 RX ORDER — DILTIAZEM HYDROCHLORIDE 60 MG/1
CAPSULE, EXTENDED RELEASE ORAL
Qty: 90 CAPSULE | Refills: 3 | Status: SHIPPED | OUTPATIENT
Start: 2023-06-08

## 2023-06-08 RX ORDER — TELMISARTAN AND HYDROCHLORTHIAZIDE 80; 25 MG/1; MG/1
1 TABLET ORAL DAILY
Qty: 90 TABLET | Refills: 1 | Status: SHIPPED | OUTPATIENT
Start: 2023-06-08

## 2023-06-08 RX ORDER — PRAVASTATIN SODIUM 40 MG
TABLET ORAL
Qty: 45 TABLET | Refills: 3 | Status: SHIPPED | OUTPATIENT
Start: 2023-06-08

## 2023-06-08 RX ORDER — POTASSIUM CHLORIDE 750 MG/1
10 TABLET, EXTENDED RELEASE ORAL DAILY
Qty: 90 TABLET | Refills: 1 | Status: SHIPPED | OUTPATIENT
Start: 2023-06-08

## 2023-06-21 ENCOUNTER — OFFICE VISIT (OUTPATIENT)
Dept: FAMILY MEDICINE CLINIC | Age: 88
End: 2023-06-21

## 2023-06-21 VITALS
DIASTOLIC BLOOD PRESSURE: 70 MMHG | HEIGHT: 64 IN | WEIGHT: 200 LBS | SYSTOLIC BLOOD PRESSURE: 108 MMHG | BODY MASS INDEX: 34.15 KG/M2

## 2023-06-21 DIAGNOSIS — E79.0 HYPERURICEMIA: ICD-10-CM

## 2023-06-21 DIAGNOSIS — I47.1 PAROXYSMAL ATRIAL TACHYCARDIA (HCC): ICD-10-CM

## 2023-06-21 DIAGNOSIS — J96.11 CHRONIC HYPOXEMIC RESPIRATORY FAILURE (HCC): ICD-10-CM

## 2023-06-21 DIAGNOSIS — J98.6 DIAPHRAGM PARALYSIS: ICD-10-CM

## 2023-06-21 DIAGNOSIS — I50.32 CHRONIC HEART FAILURE WITH PRESERVED EJECTION FRACTION (HCC): Primary | ICD-10-CM

## 2023-06-21 DIAGNOSIS — J44.9 COPD, MILD (HCC): ICD-10-CM

## 2023-06-21 DIAGNOSIS — I10 BENIGN ESSENTIAL HYPERTENSION: ICD-10-CM

## 2023-06-21 DIAGNOSIS — M15.9 PRIMARY OSTEOARTHRITIS INVOLVING MULTIPLE JOINTS: ICD-10-CM

## 2023-06-21 DIAGNOSIS — D64.9 MILD ANEMIA: ICD-10-CM

## 2023-06-21 DIAGNOSIS — N18.31 CHRONIC RENAL FAILURE, STAGE 3A (HCC): ICD-10-CM

## 2023-06-21 DIAGNOSIS — R25.2 CRAMPS, EXTREMITY: ICD-10-CM

## 2023-06-21 RX ORDER — ALLOPURINOL 300 MG/1
300 TABLET ORAL DAILY
Qty: 90 TABLET | Refills: 1 | Status: SHIPPED | OUTPATIENT
Start: 2023-06-21

## 2023-06-21 RX ORDER — ALLOPURINOL 300 MG/1
300 TABLET ORAL DAILY
Qty: 30 TABLET | Refills: 5 | Status: SHIPPED | OUTPATIENT
Start: 2023-06-21 | End: 2023-06-21

## 2023-06-21 NOTE — PROGRESS NOTES
3. Paroxysmal atrial tachycardia ([address]        4. COPD, mild ([address]        5. Chronic hypoxemic respiratory failure (HCC)        6. Diaphragm paralysis        7. Benign essential hypertension        8. Cramps, extremity        9. Mild anemia        10. Chronic renal failure, stage 3a (HCC)          Oxygen at night w/ nebulizer prn  Patient ready to go and not desiring extensive work up  Increase dairy/ nuts as snacks for weight - taste disturbance and appetite decrease major factors  ?  If ckd aggravated by bph/ htn - hydration dw/ pt  Mild anemia noted as well and will monitor periodically - check cbc w/ cmp in next 6months -caution using aleve much  Labs per cardio 5/3/23 - cr up to 1.44 w/ gfr 45 - low for patient and hgb 12.3  Labs 3/23 - gfr 56, glucose 101   Uds/ uric acid reviewed 12/22  Lipid/ cbc okay on 11/21  Routine f/u cardio - stable on lasix 20/d, PAT on cardizem  Bp stable on lasix/ kcl, cardizem and micardis hct  Cont pravachol - lipids good on last check 6/22 from cardio  oarrs reviewed and results c/w rx'd meds  Tramadol #28 on 6/1/23 - #90 on 10/19/22  High PSA d/w pt - steadily increasing psa for last 12+ years - pt declines seeing urology/ bx as doesn't want to do anything about prostate ca presently  Check cbc, iron/ tibc, b12/ ferritin, cmp, a1c on f/u visit 6months  Cari España MD, MD  6/21/2023  12:50 PM

## 2023-07-10 ENCOUNTER — TELEPHONE (OUTPATIENT)
Dept: FAMILY MEDICINE CLINIC | Age: 88
End: 2023-07-10

## 2023-07-10 ENCOUNTER — HOSPITAL ENCOUNTER (INPATIENT)
Age: 88
LOS: 1 days | Discharge: HOME | DRG: 206 | End: 2023-07-11
Attending: HOSPITALIST | Admitting: HOSPITALIST
Payer: MEDICARE

## 2023-07-10 ENCOUNTER — APPOINTMENT (OUTPATIENT)
Dept: GENERAL RADIOLOGY | Age: 88
DRG: 206 | End: 2023-07-10
Payer: MEDICARE

## 2023-07-10 ENCOUNTER — APPOINTMENT (OUTPATIENT)
Dept: CT IMAGING | Age: 88
DRG: 206 | End: 2023-07-10
Payer: MEDICARE

## 2023-07-10 ENCOUNTER — ANESTHESIA (OUTPATIENT)
Dept: ENDOSCOPY | Age: 88
DRG: 206 | End: 2023-07-10
Payer: MEDICARE

## 2023-07-10 ENCOUNTER — ANESTHESIA EVENT (OUTPATIENT)
Dept: ENDOSCOPY | Age: 88
DRG: 206 | End: 2023-07-10
Payer: MEDICARE

## 2023-07-10 DIAGNOSIS — T17.908A ASPIRATION OF FOREIGN BODY, INITIAL ENCOUNTER: Primary | ICD-10-CM

## 2023-07-10 DIAGNOSIS — T17.508A: Primary | ICD-10-CM

## 2023-07-10 LAB
ANION GAP SERPL CALCULATED.3IONS-SCNC: 15 MMOL/L (ref 3–16)
BASOPHILS # BLD: 0.1 K/UL (ref 0–0.2)
BASOPHILS NFR BLD: 0.7 %
BUN SERPL-MCNC: 34 MG/DL (ref 7–20)
CALCIUM SERPL-MCNC: 9.4 MG/DL (ref 8.3–10.6)
CHLORIDE SERPL-SCNC: 100 MMOL/L (ref 99–110)
CO2 SERPL-SCNC: 24 MMOL/L (ref 21–32)
CREAT SERPL-MCNC: 1.2 MG/DL (ref 0.8–1.3)
DEPRECATED RDW RBC AUTO: 14.3 % (ref 12.4–15.4)
EOSINOPHIL # BLD: 0.1 K/UL (ref 0–0.6)
EOSINOPHIL NFR BLD: 0.8 %
GFR SERPLBLD CREATININE-BSD FMLA CKD-EPI: 56 ML/MIN/{1.73_M2}
GLUCOSE SERPL-MCNC: 126 MG/DL (ref 70–99)
HCT VFR BLD AUTO: 39.5 % (ref 40.5–52.5)
HGB BLD-MCNC: 12.9 G/DL (ref 13.5–17.5)
LYMPHOCYTES # BLD: 1.1 K/UL (ref 1–5.1)
LYMPHOCYTES NFR BLD: 13.8 %
MCH RBC QN AUTO: 30.2 PG (ref 26–34)
MCHC RBC AUTO-ENTMCNC: 32.6 G/DL (ref 31–36)
MCV RBC AUTO: 92.7 FL (ref 80–100)
MONOCYTES # BLD: 0.9 K/UL (ref 0–1.3)
MONOCYTES NFR BLD: 12 %
NEUTROPHILS # BLD: 5.7 K/UL (ref 1.7–7.7)
NEUTROPHILS NFR BLD: 72.7 %
PLATELET # BLD AUTO: 258 K/UL (ref 135–450)
PMV BLD AUTO: 8.2 FL (ref 5–10.5)
POTASSIUM SERPL-SCNC: 4.4 MMOL/L (ref 3.5–5.1)
RBC # BLD AUTO: 4.26 M/UL (ref 4.2–5.9)
SODIUM SERPL-SCNC: 139 MMOL/L (ref 136–145)
WBC # BLD AUTO: 7.8 K/UL (ref 4–11)

## 2023-07-10 PROCEDURE — 6370000000 HC RX 637 (ALT 250 FOR IP): Performed by: HOSPITALIST

## 2023-07-10 PROCEDURE — 96374 THER/PROPH/DIAG INJ IV PUSH: CPT

## 2023-07-10 PROCEDURE — 31635 BRONCHOSCOPY W/FB REMOVAL: CPT | Performed by: INTERNAL MEDICINE

## 2023-07-10 PROCEDURE — 3700000000 HC ANESTHESIA ATTENDED CARE: Performed by: INTERNAL MEDICINE

## 2023-07-10 PROCEDURE — 2500000003 HC RX 250 WO HCPCS: Performed by: NURSE ANESTHETIST, CERTIFIED REGISTERED

## 2023-07-10 PROCEDURE — 99222 1ST HOSP IP/OBS MODERATE 55: CPT | Performed by: INTERNAL MEDICINE

## 2023-07-10 PROCEDURE — G0378 HOSPITAL OBSERVATION PER HR: HCPCS

## 2023-07-10 PROCEDURE — 2709999900 HC NON-CHARGEABLE SUPPLY: Performed by: INTERNAL MEDICINE

## 2023-07-10 PROCEDURE — 3700000001 HC ADD 15 MINUTES (ANESTHESIA): Performed by: INTERNAL MEDICINE

## 2023-07-10 PROCEDURE — 6360000002 HC RX W HCPCS: Performed by: HOSPITALIST

## 2023-07-10 PROCEDURE — 85025 COMPLETE CBC W/AUTO DIFF WBC: CPT

## 2023-07-10 PROCEDURE — C9399 UNCLASSIFIED DRUGS OR BIOLOG: HCPCS | Performed by: NURSE ANESTHETIST, CERTIFIED REGISTERED

## 2023-07-10 PROCEDURE — 74022 RADEX COMPL AQT ABD SERIES: CPT

## 2023-07-10 PROCEDURE — 2580000003 HC RX 258

## 2023-07-10 PROCEDURE — 80048 BASIC METABOLIC PNL TOTAL CA: CPT

## 2023-07-10 PROCEDURE — 99285 EMERGENCY DEPT VISIT HI MDM: CPT

## 2023-07-10 PROCEDURE — 96372 THER/PROPH/DIAG INJ SC/IM: CPT

## 2023-07-10 PROCEDURE — 6360000002 HC RX W HCPCS: Performed by: NURSE ANESTHETIST, CERTIFIED REGISTERED

## 2023-07-10 PROCEDURE — 0BC38ZZ EXTIRPATION OF MATTER FROM RIGHT MAIN BRONCHUS, VIA NATURAL OR ARTIFICIAL OPENING ENDOSCOPIC: ICD-10-PCS | Performed by: INTERNAL MEDICINE

## 2023-07-10 PROCEDURE — 2500000003 HC RX 250 WO HCPCS

## 2023-07-10 PROCEDURE — 6360000002 HC RX W HCPCS

## 2023-07-10 PROCEDURE — 71250 CT THORAX DX C-: CPT

## 2023-07-10 PROCEDURE — 7100000000 HC PACU RECOVERY - FIRST 15 MIN: Performed by: INTERNAL MEDICINE

## 2023-07-10 PROCEDURE — 1200000000 HC SEMI PRIVATE

## 2023-07-10 PROCEDURE — 2720000010 HC SURG SUPPLY STERILE: Performed by: INTERNAL MEDICINE

## 2023-07-10 PROCEDURE — 2580000003 HC RX 258: Performed by: HOSPITALIST

## 2023-07-10 PROCEDURE — 7100000001 HC PACU RECOVERY - ADDTL 15 MIN: Performed by: INTERNAL MEDICINE

## 2023-07-10 PROCEDURE — 6360000002 HC RX W HCPCS: Performed by: PHYSICIAN ASSISTANT

## 2023-07-10 PROCEDURE — 6370000000 HC RX 637 (ALT 250 FOR IP): Performed by: INTERNAL MEDICINE

## 2023-07-10 PROCEDURE — 36415 COLL VENOUS BLD VENIPUNCTURE: CPT

## 2023-07-10 PROCEDURE — 3609011600 HC BRONCHOSCOPY FOREIGN BODY REMOVAL: Performed by: INTERNAL MEDICINE

## 2023-07-10 RX ORDER — FENTANYL CITRATE 50 UG/ML
25 INJECTION, SOLUTION INTRAMUSCULAR; INTRAVENOUS EVERY 5 MIN PRN
Status: DISCONTINUED | OUTPATIENT
Start: 2023-07-10 | End: 2023-07-11 | Stop reason: HOSPADM

## 2023-07-10 RX ORDER — FUROSEMIDE 20 MG/1
20 TABLET ORAL DAILY
Status: DISCONTINUED | OUTPATIENT
Start: 2023-07-11 | End: 2023-07-11 | Stop reason: HOSPADM

## 2023-07-10 RX ORDER — SODIUM CHLORIDE 9 MG/ML
INJECTION, SOLUTION INTRAVENOUS CONTINUOUS PRN
Status: DISCONTINUED | OUTPATIENT
Start: 2023-07-10 | End: 2023-07-10 | Stop reason: SDUPTHER

## 2023-07-10 RX ORDER — TRAMADOL HYDROCHLORIDE 50 MG/1
50 TABLET ORAL EVERY 6 HOURS PRN
COMMUNITY

## 2023-07-10 RX ORDER — LORAZEPAM 2 MG/ML
0.5 INJECTION INTRAMUSCULAR ONCE
Status: COMPLETED | OUTPATIENT
Start: 2023-07-10 | End: 2023-07-10

## 2023-07-10 RX ORDER — DEXAMETHASONE SODIUM PHOSPHATE 4 MG/ML
INJECTION, SOLUTION INTRA-ARTICULAR; INTRALESIONAL; INTRAMUSCULAR; INTRAVENOUS; SOFT TISSUE PRN
Status: DISCONTINUED | OUTPATIENT
Start: 2023-07-10 | End: 2023-07-10 | Stop reason: SDUPTHER

## 2023-07-10 RX ORDER — SODIUM CHLORIDE 0.9 % (FLUSH) 0.9 %
5-40 SYRINGE (ML) INJECTION EVERY 12 HOURS SCHEDULED
Status: DISCONTINUED | OUTPATIENT
Start: 2023-07-10 | End: 2023-07-11 | Stop reason: HOSPADM

## 2023-07-10 RX ORDER — PROPOFOL 10 MG/ML
INJECTION, EMULSION INTRAVENOUS PRN
Status: DISCONTINUED | OUTPATIENT
Start: 2023-07-10 | End: 2023-07-10 | Stop reason: SDUPTHER

## 2023-07-10 RX ORDER — PRAVASTATIN SODIUM 40 MG
40 TABLET ORAL EVERY OTHER DAY
Status: DISCONTINUED | OUTPATIENT
Start: 2023-07-12 | End: 2023-07-11 | Stop reason: HOSPADM

## 2023-07-10 RX ORDER — SODIUM CHLORIDE 0.9 % (FLUSH) 0.9 %
5-40 SYRINGE (ML) INJECTION PRN
Status: DISCONTINUED | OUTPATIENT
Start: 2023-07-10 | End: 2023-07-11 | Stop reason: HOSPADM

## 2023-07-10 RX ORDER — AMOXICILLIN AND CLAVULANATE POTASSIUM 875; 125 MG/1; MG/1
1 TABLET, FILM COATED ORAL EVERY 12 HOURS SCHEDULED
Status: DISCONTINUED | OUTPATIENT
Start: 2023-07-10 | End: 2023-07-11 | Stop reason: HOSPADM

## 2023-07-10 RX ORDER — LOSARTAN POTASSIUM 100 MG/1
100 TABLET ORAL DAILY
Status: DISCONTINUED | OUTPATIENT
Start: 2023-07-11 | End: 2023-07-11 | Stop reason: HOSPADM

## 2023-07-10 RX ORDER — PROCHLORPERAZINE EDISYLATE 5 MG/ML
5 INJECTION INTRAMUSCULAR; INTRAVENOUS
Status: DISCONTINUED | OUTPATIENT
Start: 2023-07-10 | End: 2023-07-11 | Stop reason: HOSPADM

## 2023-07-10 RX ORDER — ENOXAPARIN SODIUM 100 MG/ML
40 INJECTION SUBCUTANEOUS DAILY
Status: DISCONTINUED | OUTPATIENT
Start: 2023-07-10 | End: 2023-07-11 | Stop reason: HOSPADM

## 2023-07-10 RX ORDER — ONDANSETRON 4 MG/1
4 TABLET, ORALLY DISINTEGRATING ORAL EVERY 8 HOURS PRN
Status: DISCONTINUED | OUTPATIENT
Start: 2023-07-10 | End: 2023-07-11 | Stop reason: HOSPADM

## 2023-07-10 RX ORDER — ACETAMINOPHEN 325 MG/1
650 TABLET ORAL EVERY 6 HOURS PRN
Status: DISCONTINUED | OUTPATIENT
Start: 2023-07-10 | End: 2023-07-11 | Stop reason: HOSPADM

## 2023-07-10 RX ORDER — HYDROCHLOROTHIAZIDE 25 MG/1
25 TABLET ORAL DAILY
Status: DISCONTINUED | OUTPATIENT
Start: 2023-07-11 | End: 2023-07-11 | Stop reason: HOSPADM

## 2023-07-10 RX ORDER — ACETAMINOPHEN 650 MG/1
650 SUPPOSITORY RECTAL EVERY 6 HOURS PRN
Status: DISCONTINUED | OUTPATIENT
Start: 2023-07-10 | End: 2023-07-11 | Stop reason: HOSPADM

## 2023-07-10 RX ORDER — ONDANSETRON 2 MG/ML
4 INJECTION INTRAMUSCULAR; INTRAVENOUS EVERY 6 HOURS PRN
Status: DISCONTINUED | OUTPATIENT
Start: 2023-07-10 | End: 2023-07-11 | Stop reason: HOSPADM

## 2023-07-10 RX ORDER — LATANOPROST 50 UG/ML
1 SOLUTION/ DROPS OPHTHALMIC NIGHTLY
Status: DISCONTINUED | OUTPATIENT
Start: 2023-07-11 | End: 2023-07-11 | Stop reason: HOSPADM

## 2023-07-10 RX ORDER — DILTIAZEM HYDROCHLORIDE 60 MG/1
60 CAPSULE, EXTENDED RELEASE ORAL 2 TIMES DAILY
Status: DISCONTINUED | OUTPATIENT
Start: 2023-07-10 | End: 2023-07-11 | Stop reason: HOSPADM

## 2023-07-10 RX ORDER — HYDROMORPHONE HYDROCHLORIDE 2 MG/ML
0.5 INJECTION, SOLUTION INTRAMUSCULAR; INTRAVENOUS; SUBCUTANEOUS EVERY 5 MIN PRN
Status: DISCONTINUED | OUTPATIENT
Start: 2023-07-10 | End: 2023-07-11 | Stop reason: HOSPADM

## 2023-07-10 RX ORDER — ONDANSETRON 2 MG/ML
4 INJECTION INTRAMUSCULAR; INTRAVENOUS
Status: DISCONTINUED | OUTPATIENT
Start: 2023-07-10 | End: 2023-07-11 | Stop reason: HOSPADM

## 2023-07-10 RX ORDER — ROCURONIUM BROMIDE 10 MG/ML
INJECTION, SOLUTION INTRAVENOUS PRN
Status: DISCONTINUED | OUTPATIENT
Start: 2023-07-10 | End: 2023-07-10 | Stop reason: SDUPTHER

## 2023-07-10 RX ORDER — SODIUM CHLORIDE 9 MG/ML
INJECTION, SOLUTION INTRAVENOUS PRN
Status: DISCONTINUED | OUTPATIENT
Start: 2023-07-10 | End: 2023-07-11 | Stop reason: HOSPADM

## 2023-07-10 RX ORDER — ONDANSETRON 2 MG/ML
INJECTION INTRAMUSCULAR; INTRAVENOUS PRN
Status: DISCONTINUED | OUTPATIENT
Start: 2023-07-10 | End: 2023-07-10 | Stop reason: SDUPTHER

## 2023-07-10 RX ORDER — DIPHENHYDRAMINE HYDROCHLORIDE 50 MG/ML
12.5 INJECTION INTRAMUSCULAR; INTRAVENOUS
Status: DISCONTINUED | OUTPATIENT
Start: 2023-07-10 | End: 2023-07-11 | Stop reason: HOSPADM

## 2023-07-10 RX ORDER — LATANOPROST 50 UG/ML
1 SOLUTION/ DROPS OPHTHALMIC NIGHTLY
Status: DISCONTINUED | OUTPATIENT
Start: 2023-07-10 | End: 2023-07-10

## 2023-07-10 RX ORDER — OXYCODONE HYDROCHLORIDE 5 MG/1
5 TABLET ORAL
Status: DISCONTINUED | OUTPATIENT
Start: 2023-07-10 | End: 2023-07-11 | Stop reason: HOSPADM

## 2023-07-10 RX ORDER — POLYETHYLENE GLYCOL 3350 17 G/17G
17 POWDER, FOR SOLUTION ORAL DAILY PRN
Status: DISCONTINUED | OUTPATIENT
Start: 2023-07-10 | End: 2023-07-11 | Stop reason: HOSPADM

## 2023-07-10 RX ORDER — TELMISARTAN AND HYDROCHLORTHIAZIDE 80; 25 MG/1; MG/1
1 TABLET ORAL DAILY
Status: DISCONTINUED | OUTPATIENT
Start: 2023-07-10 | End: 2023-07-10

## 2023-07-10 RX ORDER — IPRATROPIUM BROMIDE AND ALBUTEROL SULFATE 2.5; .5 MG/3ML; MG/3ML
1 SOLUTION RESPIRATORY (INHALATION) EVERY 4 HOURS PRN
Status: DISCONTINUED | OUTPATIENT
Start: 2023-07-10 | End: 2023-07-11 | Stop reason: HOSPADM

## 2023-07-10 RX ORDER — LIDOCAINE HYDROCHLORIDE 40 MG/ML
SOLUTION TOPICAL PRN
Status: DISCONTINUED | OUTPATIENT
Start: 2023-07-10 | End: 2023-07-10 | Stop reason: ALTCHOICE

## 2023-07-10 RX ORDER — SUCCINYLCHOLINE/SOD CL,ISO/PF 200MG/10ML
SYRINGE (ML) INTRAVENOUS PRN
Status: DISCONTINUED | OUTPATIENT
Start: 2023-07-10 | End: 2023-07-10 | Stop reason: SDUPTHER

## 2023-07-10 RX ORDER — ALBUTEROL SULFATE 90 UG/1
2 AEROSOL, METERED RESPIRATORY (INHALATION) EVERY 6 HOURS PRN
Status: DISCONTINUED | OUTPATIENT
Start: 2023-07-10 | End: 2023-07-11 | Stop reason: HOSPADM

## 2023-07-10 RX ORDER — LIDOCAINE HYDROCHLORIDE 20 MG/ML
INJECTION, SOLUTION INFILTRATION; PERINEURAL PRN
Status: DISCONTINUED | OUTPATIENT
Start: 2023-07-10 | End: 2023-07-10 | Stop reason: SDUPTHER

## 2023-07-10 RX ADMIN — PHENYLEPHRINE HYDROCHLORIDE 100 MCG: 10 INJECTION INTRAVENOUS at 16:24

## 2023-07-10 RX ADMIN — AMOXICILLIN AND CLAVULANATE POTASSIUM 1 TABLET: 875; 125 TABLET, FILM COATED ORAL at 20:19

## 2023-07-10 RX ADMIN — SODIUM CHLORIDE: 9 INJECTION, SOLUTION INTRAVENOUS at 16:00

## 2023-07-10 RX ADMIN — DILTIAZEM HYDROCHLORIDE 60 MG: 60 CAPSULE, EXTENDED RELEASE ORAL at 20:19

## 2023-07-10 RX ADMIN — Medication 140 MG: at 16:13

## 2023-07-10 RX ADMIN — DEXAMETHASONE SODIUM PHOSPHATE 8 MG: 4 INJECTION, SOLUTION INTRAMUSCULAR; INTRAVENOUS at 16:13

## 2023-07-10 RX ADMIN — PROPOFOL 100 MG: 10 INJECTION, EMULSION INTRAVENOUS at 16:13

## 2023-07-10 RX ADMIN — ONDANSETRON 4 MG: 2 INJECTION INTRAMUSCULAR; INTRAVENOUS at 16:13

## 2023-07-10 RX ADMIN — LORAZEPAM 0.5 MG: 2 INJECTION INTRAMUSCULAR; INTRAVENOUS at 15:59

## 2023-07-10 RX ADMIN — PIPERACILLIN SODIUM AND TAZOBACTAM SODIUM 3375 MG: 3; .375 INJECTION, POWDER, FOR SOLUTION INTRAVENOUS at 18:41

## 2023-07-10 RX ADMIN — ENOXAPARIN SODIUM 40 MG: 100 INJECTION SUBCUTANEOUS at 18:43

## 2023-07-10 RX ADMIN — LIDOCAINE HYDROCHLORIDE 100 MG: 20 INJECTION, SOLUTION INFILTRATION; PERINEURAL at 16:13

## 2023-07-10 RX ADMIN — SUGAMMADEX 200 MG: 100 INJECTION, SOLUTION INTRAVENOUS at 16:33

## 2023-07-10 RX ADMIN — ROCURONIUM BROMIDE 20 MG: 100 INJECTION, SOLUTION INTRAVENOUS at 16:24

## 2023-07-10 ASSESSMENT — ENCOUNTER SYMPTOMS
ABDOMINAL PAIN: 0
WHEEZING: 0
DIARRHEA: 0
RHINORRHEA: 0
SORE THROAT: 1
COUGH: 0
VOMITING: 0
SHORTNESS OF BREATH: 0
NAUSEA: 0

## 2023-07-10 NOTE — PROGRESS NOTES
Pt transported to inpatient room 482; pt able to stand and ambulate to bed; pt transported with all belongings. Pt family updated on pt status, gave them pt room assignment.  Bedside report given to RN, v/u

## 2023-07-10 NOTE — CONSULTS
[facility] Pulmonary and Critical Care   Consult Note      Reason for Consult: Aspirated foreign body/tooth crown  Requesting Physician: Ishmael Ly    Subjective:   CHIEF COMPLAINT: Aspirated tooth crown     HPI: Patient was at his dentist office today for removal of tooth crown. During the procedure, it inadvertently was aspirated into his lungs. Patient felt that there was \"something stuck in his throat\". He was given gram crackers to swallow it down his esophagus. Was sent to ER for an evaluation. Radiopaque tooth crown was noted in the right mainstem bronchus. Pulmonary consultation has been requested. Currently patient is alert and not in distress. Has nonproductive coughing episodes. No wheezing. History of COPD, uses DuoNeb/albuterol inhaler as needed. Patient also has elevated left hemidiaphragm. Uses home O2 particularly at nighttime.   History of paroxysmal atrial fibrillation, hypertension and hyperlipidemia       The patient is a 80 y.o. male with significant past medical history of:      Diagnosis Date    Afib ([address])     Anxiety     Atrial fibrillation ([address])     Benign essential hypertension     Cervical back pain with evidence of disc disease     Cramps, extremity     Elevated prostate specific antigen (PSA)     history of    Emphysema     Hypercholesteremia     Kidney stones         Past Surgical History:        Procedure Laterality Date    APPENDECTOMY      CARPAL TUNNEL RELEASE Bilateral     COCHLEAR IMPLANT  2017    bilat -service related - done at [address] [facility]      cataract with lens implant    HERNIA REPAIR      rt side    KNEE ARTHROSCOPY Bilateral     THROAT SURGERY      uvula resected, and nasal surgery for sleep apnea    TURP  2007    VASECTOMY       Current Medications:    Current Facility-Administered Medications: dilTIAZem (CARDIZEM 12 HR) extended release capsule 60 mg, 60 mg, Oral, BID  [START ON 7/11/2023] furosemide (LASIX) tablet 20 mg, 20 mg, Oral, seen)     Next appt: 11/21/2023 at 01:00 PM in Cardiology (Inessa Come, DO)     0 Result Notes  Details    Reading Physician Reading Date Result Priority   Kina Rain MD  [phone] 7/10/2023      Narrative & Impression  EXAMINATION:  CT OF THE CHEST WITHOUT CONTRAST 7/10/2023 2:34 pm     TECHNIQUE:  CT of the chest was performed without the administration of intravenous  contrast. Multiplanar reformatted images are provided for review. Automated  exposure control, iterative reconstruction, and/or weight based adjustment of  the mA/kV was utilized to reduce the radiation dose to as low as reasonably  achievable. COMPARISON:  6 MONTHS PRIOR     HISTORY:  ORDERING SYSTEM PROVIDED HISTORY: fb  TECHNOLOGIST PROVIDED HISTORY:  Reason for exam:->fb  Decision Support Exception - unselect if not a suspected or confirmed  emergency medical condition->Emergency Medical Condition (MA)  Reason for Exam: fb     FINDINGS:  Mediastinum: MODERATE ATHEROSCLEROTIC CHANGES SEEN WITHIN THE THORACIC AORTA. THE HEART IS MILDLY ENLARGED. INTRATHORACIC HIATAL HERNIA IS NOTED. THERE IS A 7 X 8 MM RADIOPAQUE FOREIGN BODY SEEN WITHIN THE RIGHT INFERIOR  LOBAR BRONCHUS     Lungs/pleura: CONTINUED ELEVATION OF THE LEFT HEMIDIAPHRAGM. BIBASILAR  ATELECTASIS. Upper Abdomen: UNREMARKABLE     Soft Tissues/Bones: UNREMARKABLE     IMPRESSION:  7 X 8 MM RADIOPAQUE FOREIGN BODY IS SEEN WITHIN THE RIGHT INFERIOR LOBAR  BRONCHUS     ELEVATION OF THE LEFT HEMIDIAPHRAGM REMAINS UNCHANGED. BIBASILAR  ATELECTASIS. TO THE PRIOR EXAM.        Media Information      Document Information        Problem List:   Foreign body aspiration/tooth crown    Assessment/Plan:     Reviewed patient's chest x-ray and CT scan. 8 mm radiopaque opacity noted in the right mainstem bronchus. Urgent bronchoscopy was performed. Different retrieving devices were utilized, finally we were able to retrieve the foreign body using snare.   Patient tolerated

## 2023-07-10 NOTE — TELEPHONE ENCOUNTER
Josefina Gore needs us to fax over the test results for CT CHEST [address].     Fax no. [phone]

## 2023-07-10 NOTE — ED NOTES
Gave report to Endoscopy RN .  Pt being transported by bed to Endoscopy      Srinivasa Rossi RN  07/10/23 1905

## 2023-07-10 NOTE — ED NOTES
ED TO INPATIENT SBAR HANDOFF    Patient Name: Melva Hinojosa   :  1930  80 y.o. MRN:  [id number]  Preferred Name  Mary Celis  ED Room #:  ED-0015/15  Family/Caregiver Present yes   Restraints no   Sitter no   Sepsis Risk Score Sepsis Risk Score: 1.02    Situation  Code Status: Prior No additional code details. Allergies: Codeine and Timolol maleate  Weight: Patient Vitals for the past 96 hrs (Last 3 readings):   Weight   07/10/23 1303 201 lb 11.2 oz (91.5 kg)     Arrived from: home  Chief Complaint:   Chief Complaint   Patient presents with    Other     Pt via wife from dentist, swallowed tooth with gold crown, feels like its stuck in throat, dr wanted chest xray to make sure its going down to stomach and not lungs, denies [address] Problem/Diagnosis:  Principal Problem:    Foreign body aspiration, initial encounter  Resolved Problems:    * No resolved hospital problems. *    Imaging:   XR ACUTE ABD SERIES CHEST 1 VW   Final Result   1. 8 mm radiopaque foreign body overlying the right hilum. 2. Multiple dilated loops of small bowel favoring an ileus rather than   partial small bowel obstruction.          CT CHEST WO CONTRAST    (Results Pending)     Abnormal labs:   Abnormal Labs Reviewed   CBC WITH AUTO DIFFERENTIAL - Abnormal; Notable for the following components:       Result Value    Hemoglobin 12.9 (*)     Hematocrit 39.5 (*)     All other components within normal limits   BASIC METABOLIC PANEL - Abnormal; Notable for the following components:    Glucose 126 (*)     BUN 34 (*)     Est, Glom Filt Rate 56 (*)     All other components within normal limits     Critical values: no     Abnormal Assessment Findings: Gold tooth in lung    Background  History:   Past Medical History:   Diagnosis Date    Afib (HCC)     Anxiety     Atrial fibrillation (HCC)     Benign essential hypertension     Cervical back pain with evidence of disc disease     Cramps, extremity     Elevated prostate specific antigen

## 2023-07-10 NOTE — PROGRESS NOTES
Pt stable and able to be transferred from PACU to room 4482. A&O , VSS, with no complaints at this time. 4T called and notified that pt is being transferred out of PACU and to room.

## 2023-07-10 NOTE — PROGRESS NOTES
Pt arrived from endo to PACU bay 10. Reported received from endo staff. Pt asleep, minimally arousable to voice. Pt arrives with simple mask in place, infusing 6L oxygen, vitals as charted.

## 2023-07-10 NOTE — H&P
HOSPITALISTS HISTORY AND PHYSICAL    7/10/2023 3:41 PM    Patient Information:  Aryan Poole is a 80 y.o. male [id number]  PCP:  León Plunkett MD (Tel: [phone] )    Chief complaint:    Chief Complaint   Patient presents with    Other     Pt via wife from dentist, swallowed tooth with gold crown, feels like its stuck in throat, dr wanted chest xray to make sure its going down to stomach and not lungs, denies sob         History of Present Illness:  Aryan Poole is a 80 y.o. male who presented with from dentist office for evaluation of aspiration versus ingestion patient was at the dentist and had \"popped out of his tooth. Went down his throat. Patient fell against something is stuck there. Dentist asked patient to come to the ER to get imaging to ensure aspiration. Patient denies any chest pain or shortness of breath difficulty swallowing or breathing. Pulmonary was consulted x-ray noted 8 mm radiopaque foreign object overlying the right helium   REVIEW OF SYSTEMS:   Constitutional: Negative for fever,chills or night sweats  ENT: Negative for rhinorrhea, epistaxis, hoarseness, sore throat. Respiratory: Negative for shortness of breath,wheezing  Cardiovascular: Negative for chest pain, palpitations   Gastrointestinal: Negative for nausea, vomiting, diarrhea  Genitourinary: Negative for polyuria, dysuria   Hematologic/Lymphatic: Negative for bleeding tendency, easy bruising  Musculoskeletal: Negative for myalgias and arthralgias  Neurologic: Negative for confusion,dysarthria. Skin: Negative for itching,rash, good capillary refill. Psychiatric: Negative for depression,anxiety, agitation. Endocrine: Negative for polydipsia,polyuria,heat /cold intolerance.     Past Medical History:   has a past medical history of Afib ([address]), Anxiety, Atrial fibrillation

## 2023-07-10 NOTE — PROGRESS NOTES
Teaching / education initiated regarding perioperative experience, expectations, and pain management during stay. Patient verbalized understanding. Wife and son at bedside. Pt signed procedure consent in ER prior to receiving ativan. Son signed consent for anesthesia and for DNR suspension d/t ativan being given. Reviewed patient's medical and surgical history in electronic record and with patient at the bedside. All questions regarding procedure answered. Scope verified using 2 person system. Family in waiting room.   Electronically signed by Ari Gomez RN on 7/10/2023 at 4:15 PM

## 2023-07-10 NOTE — PROGRESS NOTES
Pharmacy Home Medication Reconciliation Note    A medication reconciliation has been completed for aSida Franks 5/14/1930    Pharmacy: [facility] provided by: patient's wife    The patient's home medication list is as follows: No current facility-administered medications on file prior to encounter. Current Outpatient Medications on File Prior to Encounter   Medication Sig Dispense Refill    traMADol (ULTRAM) 50 MG tablet Take 1 tablet by mouth every 6 hours as needed for Pain. Max Daily Amount: 200 mg      allopurinol (ZYLOPRIM) 300 MG tablet TAKE 1 TABLET BY MOUTH DAILY 90 tablet 1    pravastatin (PRAVACHOL) 40 MG tablet TAKE 1 TABLET BY MOUTH EVERY OTHER DAY 45 tablet 3    potassium chloride (KLOR-CON M) 10 MEQ extended release tablet TAKE 1 TABLET BY MOUTH DAILY 90 tablet 1    telmisartan-hydroCHLOROthiazide (MICARDIS HCT) 80-25 MG per tablet TAKE 1 TABLET BY MOUTH DAILY 90 tablet 1    dilTIAZem (CARDIZEM 12 HR) 60 MG extended release capsule TAKE 1 CAPSULE BY MOUTH TWICE DAILY 90 capsule 3    [DISCONTINUED] ipratropium-albuterol (DUONEB) 0.5-2.5 (3) MG/3ML SOLN nebulizer solution INHALE 3 MLS INTO THE LUNGS USING NEBULIZER EVERY 4 HOURS 360 mL 3    furosemide (LASIX) 20 MG tablet TAKE 1 TABLET BY MOUTH DAILY 90 tablet 3    NONFORMULARY Take 80 mg by mouth as needed Uric      probenecid (BENEMID) 500 MG tablet Take 1 tablet by mouth 2 times daily as needed      naproxen (NAPROSYN) 500 MG tablet Take 1 tablet by mouth 2 times daily (with meals)      PROAIR  (90 Base) MCG/ACT inhaler Inhale 2 puffs into the lungs every 6 hours as needed for Wheezing 1 Inhaler 11    vitamin D 25 MCG (1000 UT) CAPS Take 1 capsule by mouth nightly      ipratropium-albuterol (DUONEB) 0.5-2.5 (3) MG/3ML SOLN nebulizer solution Take 3 mLs by nebulization every 6 hours as needed for Shortness of Breath.  360 mL 1    latanoprost (XALATAN) 0.005 % ophthalmic solution Place 1 drop

## 2023-07-10 NOTE — TELEPHONE ENCOUNTER
CXR order placed.     --Virgil Coleman, APRN - CNP
Dr. Jose Rosenbaum would like a provider to put in an order for a chest xray to make sure the patient did not aspirate on his gold crown that he said he swallowed.       [facility]
no

## 2023-07-10 NOTE — ANESTHESIA PRE PROCEDURE
Department of Anesthesiology  Preprocedure Note       Name:  Saida Franks   Age:  80 y.o.  :  1930                                          MRN:  [id number]         Date:  7/10/2023      Surgeon: Claudine Alexander):  Eber Cooper MD    Procedure: Procedure(s):  BRONCHOSCOPY FOREIGN BODY REMOVAL    Medications prior to admission:   Prior to Admission medications    Medication Sig Start Date End Date Taking? Authorizing Provider   traMADol (ULTRAM) 50 MG tablet Take 1 tablet by mouth every 6 hours as needed for Pain.  Max Daily Amount: 200 mg   Yes Historical Provider, MD   allopurinol (ZYLOPRIM) 300 MG tablet TAKE 1 TABLET BY MOUTH DAILY 23   Mary Carter MD   pravastatin (PRAVACHOL) 40 MG tablet TAKE 1 TABLET BY MOUTH EVERY OTHER DAY 23   Britany Abler, DO   potassium chloride (KLOR-CON M) 10 MEQ extended release tablet TAKE 1 TABLET BY MOUTH DAILY 23   Britany Abler, DO   telmisartan-hydroCHLOROthiazide (MICARDIS HCT) 80-25 MG per tablet TAKE 1 TABLET BY MOUTH DAILY 23   Britany Abler, DO   dilTIAZem (CARDIZEM 12 HR) 60 MG extended release capsule TAKE 1 CAPSULE BY MOUTH TWICE DAILY 23   Britany Abler, DO   furosemide (LASIX) 20 MG tablet TAKE 1 TABLET BY MOUTH DAILY 22   Britany Abler, DO   NONFORMULARY Take 80 mg by mouth as needed Uric    Historical Provider, MD   probenecid (BENEMID) 500 MG tablet Take 1 tablet by mouth 2 times daily as needed    Historical Provider, MD   naproxen (NAPROSYN) 500 MG tablet Take 1 tablet by mouth 2 times daily (with meals)    Historical Provider, MD   PROAIR  (90 Base) MCG/ACT inhaler Inhale 2 puffs into the lungs every 6 hours as needed for Wheezing 21   Get Traore MD   vitamin D 25 MCG (1000 UT) CAPS Take 1 capsule by mouth nightly    Historical Provider, MD   ipratropium-albuterol (DUONEB) 0.5-2.5 (3) MG/3ML SOLN nebulizer solution Take 3 mLs by nebulization every 6 hours as needed for Shortness of Breath. 3/23/15

## 2023-07-10 NOTE — ED PROVIDER NOTES
[facility]        Pt Name: Diana Estevez  MRN: [id number]  [address] 5/14/1930  Date of evaluation: 7/10/2023  Provider: Miekl Ibrahim PA-C  PCP: Ozzie Campos MD  Note Started: 1:22 PM EDT 7/10/23      DAVID. I have evaluated this patient. CHIEF COMPLAINT       Chief Complaint   Patient presents with    Other     Pt via wife from dentist, swallowed tooth with gold crown, feels like its stuck in throat, dr wanted chest xray to make sure its going down to stomach and not lungs, denies sob        HISTORY OF PRESENT ILLNESS: 1 or more Elements     History From: patient   Limitations to history : None    Diana Estevez is a 80 y.o. male who presents for evaluation of concern for aspiration versus ingestion. Patient states that he was at the dentist and a cold Popped off his tooth and went down his throat. Patient states that he feels like it is still stuck in his throat but the dentist told him to come here to get imaging to make sure it is not in his lungs. He denies chest pain or shortness of breath. No difficulty swallowing drooling or voice changes. No abdominal pain nausea vomiting. He has no other complaints or concerns at this time. Nursing Notes were all reviewed and agreed with or any disagreements were addressed in the HPI. REVIEW OF SYSTEMS :      Review of Systems   Constitutional:  Negative for appetite change, chills and fever. HENT:  Positive for sore throat. Negative for congestion and rhinorrhea. Respiratory:  Negative for cough, shortness of breath and wheezing. Cardiovascular:  Negative for chest pain. Gastrointestinal:  Negative for abdominal pain, diarrhea, nausea and vomiting. Genitourinary:  Negative for difficulty urinating, dysuria and hematuria. Musculoskeletal:  Negative for neck pain and neck stiffness. Skin:  Negative for rash. Neurological:  Negative for headaches.      Positives and MEDICATIONS:  Discontinued Medications    No medications on file              (Please note that portions of this note were completed with a voice recognition program.  Efforts were made to edit the dictations but occasionally words are mis-transcribed.)    Ashley Garcia PA-C (electronically signed)            French Robin PA-C  07/10/23 [address] [place] [address]  07/10/23 2298

## 2023-07-11 ENCOUNTER — APPOINTMENT (OUTPATIENT)
Dept: GENERAL RADIOLOGY | Age: 88
DRG: 206 | End: 2023-07-11
Payer: MEDICARE

## 2023-07-11 VITALS
BODY MASS INDEX: 34.69 KG/M2 | OXYGEN SATURATION: 95 % | SYSTOLIC BLOOD PRESSURE: 120 MMHG | DIASTOLIC BLOOD PRESSURE: 67 MMHG | RESPIRATION RATE: 18 BRPM | TEMPERATURE: 97.8 F | HEART RATE: 95 BPM | HEIGHT: 64 IN | WEIGHT: 203.2 LBS

## 2023-07-11 LAB
ANION GAP SERPL CALCULATED.3IONS-SCNC: 13 MMOL/L (ref 3–16)
BASOPHILS # BLD: 0 K/UL (ref 0–0.2)
BASOPHILS NFR BLD: 0.1 %
BUN SERPL-MCNC: 32 MG/DL (ref 7–20)
CALCIUM SERPL-MCNC: 9 MG/DL (ref 8.3–10.6)
CHLORIDE SERPL-SCNC: 104 MMOL/L (ref 99–110)
CO2 SERPL-SCNC: 23 MMOL/L (ref 21–32)
CREAT SERPL-MCNC: 1.2 MG/DL (ref 0.8–1.3)
DEPRECATED RDW RBC AUTO: 14 % (ref 12.4–15.4)
EOSINOPHIL # BLD: 0 K/UL (ref 0–0.6)
EOSINOPHIL NFR BLD: 0 %
GFR SERPLBLD CREATININE-BSD FMLA CKD-EPI: 56 ML/MIN/{1.73_M2}
GLUCOSE SERPL-MCNC: 155 MG/DL (ref 70–99)
HCT VFR BLD AUTO: 37.7 % (ref 40.5–52.5)
HGB BLD-MCNC: 12.4 G/DL (ref 13.5–17.5)
LYMPHOCYTES # BLD: 0.6 K/UL (ref 1–5.1)
LYMPHOCYTES NFR BLD: 9.7 %
MCH RBC QN AUTO: 30.1 PG (ref 26–34)
MCHC RBC AUTO-ENTMCNC: 32.7 G/DL (ref 31–36)
MCV RBC AUTO: 92.1 FL (ref 80–100)
MONOCYTES # BLD: 0.2 K/UL (ref 0–1.3)
MONOCYTES NFR BLD: 2.5 %
NEUTROPHILS # BLD: 5.4 K/UL (ref 1.7–7.7)
NEUTROPHILS NFR BLD: 87.7 %
PLATELET # BLD AUTO: 228 K/UL (ref 135–450)
PMV BLD AUTO: 8.3 FL (ref 5–10.5)
POTASSIUM SERPL-SCNC: 5.3 MMOL/L (ref 3.5–5.1)
RBC # BLD AUTO: 4.1 M/UL (ref 4.2–5.9)
SODIUM SERPL-SCNC: 140 MMOL/L (ref 136–145)
WBC # BLD AUTO: 6.1 K/UL (ref 4–11)

## 2023-07-11 PROCEDURE — 80048 BASIC METABOLIC PNL TOTAL CA: CPT

## 2023-07-11 PROCEDURE — 6370000000 HC RX 637 (ALT 250 FOR IP): Performed by: INTERNAL MEDICINE

## 2023-07-11 PROCEDURE — 99232 SBSQ HOSP IP/OBS MODERATE 35: CPT | Performed by: INTERNAL MEDICINE

## 2023-07-11 PROCEDURE — G0378 HOSPITAL OBSERVATION PER HR: HCPCS

## 2023-07-11 PROCEDURE — 36415 COLL VENOUS BLD VENIPUNCTURE: CPT

## 2023-07-11 PROCEDURE — 2580000003 HC RX 258: Performed by: ANESTHESIOLOGY

## 2023-07-11 PROCEDURE — 6360000002 HC RX W HCPCS: Performed by: HOSPITALIST

## 2023-07-11 PROCEDURE — 71045 X-RAY EXAM CHEST 1 VIEW: CPT

## 2023-07-11 PROCEDURE — 96372 THER/PROPH/DIAG INJ SC/IM: CPT

## 2023-07-11 PROCEDURE — 2580000003 HC RX 258: Performed by: HOSPITALIST

## 2023-07-11 PROCEDURE — 85025 COMPLETE CBC W/AUTO DIFF WBC: CPT

## 2023-07-11 PROCEDURE — 6370000000 HC RX 637 (ALT 250 FOR IP): Performed by: HOSPITALIST

## 2023-07-11 RX ORDER — AMOXICILLIN AND CLAVULANATE POTASSIUM 875; 125 MG/1; MG/1
1 TABLET, FILM COATED ORAL EVERY 12 HOURS SCHEDULED
Qty: 14 TABLET | Refills: 0 | Status: SHIPPED | OUTPATIENT
Start: 2023-07-11 | End: 2023-07-18

## 2023-07-11 RX ADMIN — FUROSEMIDE 20 MG: 20 TABLET ORAL at 09:19

## 2023-07-11 RX ADMIN — ACETAMINOPHEN 650 MG: 325 TABLET ORAL at 09:18

## 2023-07-11 RX ADMIN — LOSARTAN POTASSIUM 100 MG: 100 TABLET, FILM COATED ORAL at 09:18

## 2023-07-11 RX ADMIN — HYDROCHLOROTHIAZIDE 25 MG: 25 TABLET ORAL at 09:18

## 2023-07-11 RX ADMIN — DILTIAZEM HYDROCHLORIDE 60 MG: 60 CAPSULE, EXTENDED RELEASE ORAL at 09:18

## 2023-07-11 RX ADMIN — AMOXICILLIN AND CLAVULANATE POTASSIUM 1 TABLET: 875; 125 TABLET, FILM COATED ORAL at 09:17

## 2023-07-11 RX ADMIN — ENOXAPARIN SODIUM 40 MG: 100 INJECTION SUBCUTANEOUS at 09:19

## 2023-07-11 RX ADMIN — Medication 10 ML: at 09:20

## 2023-07-11 RX ADMIN — SODIUM CHLORIDE, PRESERVATIVE FREE 10 ML: 5 INJECTION INTRAVENOUS at 09:19

## 2023-07-11 ASSESSMENT — PAIN SCALES - WONG BAKER
WONGBAKER_NUMERICALRESPONSE: 0

## 2023-07-11 ASSESSMENT — PAIN DESCRIPTION - DESCRIPTORS: DESCRIPTORS: ACHING

## 2023-07-11 ASSESSMENT — PAIN DESCRIPTION - ONSET: ONSET: GRADUAL

## 2023-07-11 ASSESSMENT — PAIN DESCRIPTION - LOCATION: LOCATION: HEAD

## 2023-07-11 ASSESSMENT — PAIN SCALES - GENERAL
PAINLEVEL_OUTOF10: 0
PAINLEVEL_OUTOF10: 0
PAINLEVEL_OUTOF10: 3

## 2023-07-11 ASSESSMENT — PAIN DESCRIPTION - FREQUENCY: FREQUENCY: INTERMITTENT

## 2023-07-11 ASSESSMENT — PAIN DESCRIPTION - ORIENTATION: ORIENTATION: OUTER

## 2023-07-11 ASSESSMENT — PAIN DESCRIPTION - PAIN TYPE: TYPE: CHRONIC PAIN

## 2023-07-11 ASSESSMENT — PAIN - FUNCTIONAL ASSESSMENT: PAIN_FUNCTIONAL_ASSESSMENT: ACTIVITIES ARE NOT PREVENTED

## 2023-07-11 NOTE — PROGRESS NOTES
[facility] discharged to [facility] went over all discharge instructions with pt and he verbalized understanding. IV taken out reminded to  abx at his pharmacy influenza no distress at discharge. Pt left with all his belongings.

## 2023-07-11 NOTE — PROGRESS NOTES
[facility] Pulmonary/CCM Progress note      Admit Date: 7/10/2023    Chief Complaint: Foreign body/metal tooth crown aspiration    Subjective: Interval History: Status post retrieval of foreign body by bronchoscopy. Feels well. No significant cough or shortness of breath.     Scheduled Meds:   dilTIAZem  60 mg Oral BID    furosemide  20 mg Oral Daily    [START ON 7/12/2023] pravastatin  40 mg Oral Every Other Day    sodium chloride flush  5-40 mL IntraVENous 2 times per day    enoxaparin  40 mg SubCUTAneous Daily    sodium chloride flush  5-40 mL IntraVENous 2 times per day    losartan  100 mg Oral Daily    hydroCHLOROthiazide  25 mg Oral Daily    latanoprost  1 drop Both Eyes Nightly    amoxicillin-clavulanate  1 tablet Oral 2 times per day     Continuous Infusions:   sodium chloride      sodium chloride       PRN Meds:ipratropium 0.5 mg-albuterol 2.5 mg, albuterol sulfate HFA, sodium chloride flush, sodium chloride, ondansetron **OR** ondansetron, polyethylene glycol, acetaminophen **OR** acetaminophen, sodium chloride flush, sodium chloride, fentanNYL, HYDROmorphone, oxyCODONE, ondansetron, prochlorperazine, diphenhydrAMINE    Review of Systems  Constitutional: negative for fatigue, fevers, malaise and weight loss  Ears, nose, mouth, throat: negative for ear drainage, epistaxis, hoarseness, nasal congestion, sore throat and voice change  Respiratory: negative for shortness of breath, cough, phlegm or pleurisy  Cardiovascular: negative for chest pain, chest pressure/discomfort, irregular heart beat, lower extremity edema and palpitations  Gastrointestinal: negative for abdominal pain, constipation, diarrhea, jaundice, melena, odynophagia, reflux symptoms and vomiting  Hematologic/lymphatic: negative for bleeding, easy bruising, lymphadenopathy and petechiae  Musculoskeletal:negative for arthralgias, bone pain, muscle weakness, neck pain and stiff joints  Neurological: negative for dizziness, gait problems, 07/11/2023 05:00 AM     BMP:   Lab Results   Component Value Date/Time    GLUCOSE 155 07/11/2023 05:00 AM    CO2 23 07/11/2023 05:00 AM    BUN 32 07/11/2023 05:00 AM    CREATININE 1.2 07/11/2023 05:00 AM    CALCIUM 9.0 07/11/2023 05:00 AM     ABG: No results found for: JJT4DYR, BEART, Q1GOUXUM, PHART, THGBART, EVG2MXE, PO2ART, XLZ1WYQ    Radiology: All pertinent images / reports were reviewed as a part of this visit. EXAMINATION:  ONE XRAY VIEW OF THE CHEST     7/11/2023 6:17 am     COMPARISON:  April 10, 2021     HISTORY:  ORDERING SYSTEM PROVIDED HISTORY: evaluate for infitlrates, sp foreign body  removal by bronchoscopy. TECHNOLOGIST PROVIDED HISTORY:  Reason for exam:->evaluate for infitlrates, sp foreign body removal by  bronchoscopy. FINDINGS:  Atelectasis versus infiltrate left lung base. Minimal atelectasis right lung  base. Chronically markedly elevated left hemidiaphragm. No pneumothorax. Normal heart size. Significant osteopenic changes and degenerative changes  noted in the bony structures. IMPRESSION:  Atelectasis versus infiltrate left lung base. Minimal atelectasis right lung  base. Problem List:     Foreign body aspiration/tooth crown    Assessment/Plan:     Foreign body/metal tooth crown aspiration. Bronchoscopy with retrieval of the crown which was noted in the right mainstem bronchus with use of snare. Chest x-ray this a.m. shows absence of any foreign body. Has chronic left diaphragm elevation. Treat with oral Augmentin x5 days. Okay for discharge from pulmonary standpoint. Patient advised to follow-up with his dentist and PCP.     Sukumar Stanford MD

## 2023-07-11 NOTE — PLAN OF CARE
Problem: Discharge Planning  Goal: Discharge to home or other facility with appropriate resources  7/10/2023 2317 by Khushi Giordano RN  Outcome: Progressing  Flowsheets (Taken 7/10/2023 1815 by Marce Moritz, RN)  Discharge to home or other facility with appropriate resources: Identify barriers to discharge with patient and caregiver  7/10/2023 1810 by Marce Moritz, RN  Outcome: Progressing  Flowsheets (Taken 7/10/2023 1800)  Discharge to home or other facility with appropriate resources: Identify barriers to discharge with patient and caregiver     Problem: Safety - Adult  Goal: Free from fall injury  7/10/2023 2317 by Khushi Giordano RN  Outcome: Progressing  7/10/2023 1810 by Marce Moritz, RN  Outcome: Progressing

## 2023-07-11 NOTE — PLAN OF CARE
Problem: Discharge Planning  Goal: Discharge to home or other facility with appropriate resources  7/11/2023 1124 by Duncan Yu RN  Outcome: Completed  7/10/2023 2317 by Shane Bliss RN  Outcome: Progressing  Flowsheets (Taken 7/10/2023 1815 by Duncan Yu RN)  Discharge to home or other facility with appropriate resources: Identify barriers to discharge with patient and caregiver     Problem: Safety - Adult  Goal: Free from fall injury  7/11/2023 1124 by Duncan Yu RN  Outcome: Completed  7/10/2023 2317 by Shane Bliss RN  Outcome: Progressing     Problem: Pain  Goal: Verbalizes/displays adequate comfort level or baseline comfort level  Outcome: Completed

## 2023-07-11 NOTE — CARE COORDINATION
Discharge Planning Note:    Chart reviewed and it appears that patient has minimal needs for discharge at this time. Discussed with patient and requested that case management be notified if discharge needs are identified.     - Current discharge plan is for the patient to return home. Case management will continue to follow progress and update discharge plan as needed.       Risk of Readmission Score: 12%    NAILA Flowers RN    [facility]  Phone: [phone]

## 2023-07-11 NOTE — PROGRESS NOTES
Shift assessment complete, alert and oriented X4, VSS, all night meds given, ambulated in room to bathroom, voiding and BM X2. Hard of hearing, hearing aid right ear, cochlear implant left ear. Safety precaution in place. The care plan and education has been reviewed and mutually agreed upon with the patient.      Electronically signed by Shane Bliss RN on 7/11/2023 at 4:14 AM

## 2023-07-12 ENCOUNTER — CARE COORDINATION (OUTPATIENT)
Dept: CASE MANAGEMENT | Age: 88
End: 2023-07-12

## 2023-07-12 ENCOUNTER — TELEPHONE (OUTPATIENT)
Dept: PULMONOLOGY | Age: 88
End: 2023-07-12

## 2023-07-12 DIAGNOSIS — T17.908A FOREIGN BODY ASPIRATION, INITIAL ENCOUNTER: Primary | ICD-10-CM

## 2023-07-12 PROCEDURE — 1111F DSCHRG MED/CURRENT MED MERGE: CPT | Performed by: FAMILY MEDICINE

## 2023-07-12 NOTE — ANESTHESIA POSTPROCEDURE EVALUATION
Department of Anesthesiology  Postprocedure Note    Patient: Chong Essex  MRN: [id number]  YOB: 1930  Date of evaluation: 7/12/2023      Procedure Summary     Date: 07/10/23 Room / Location: [address] [place] [address]    Anesthesia Start: 5028 Anesthesia Stop: 9713    Procedure: BRONCHOSCOPY FOREIGN BODY REMOVAL Diagnosis:       Foreign body (FB) in soft tissue      (Foreign body (FB) in soft tissue [M79.5])    Surgeons: Lance Cummings MD Responsible Provider: Darrell Carolina MD    Anesthesia Type: general ASA Status: 3 - Emergent          Anesthesia Type: No value filed.     Alva Phase I: Alva Score: 10    Alva Phase II:        Anesthesia Post Evaluation

## 2023-07-12 NOTE — CARE COORDINATION
including: PCP  Urgent care clinics  When to call 911. The patient agrees to contact the PCP office for questions related to their healthcare. Advance Care Planning:   Does patient have an Advance Directive: reviewed and current. Medication reconciliation was performed with patient, who verbalizes understanding of administration of home medications. Medications reviewed, 1111F entered: yes    Was patient discharged with a pulse oximeter? no    Non-face-to-face services provided:  Obtained and reviewed discharge summary and/or continuity of care documents    Offered patient enrollment in the Remote Patient Monitoring (RPM) program for in-home monitoring: Will offer on follow up call . Care Transitions 24 Hour Call    Do you have a copy of your discharge instructions?: Yes  Do you have all of your prescriptions and are they filled?: Yes  Have you been contacted by a [address]?: No  Have you scheduled your follow up appointment?: No (Comment: Will follow up with dentist)  Do you have support at home?: Partner/Spouse/SO  Do you feel like you have everything you need to keep you well at home?: Yes  Are you an active caregiver in your home?: No  Care Transitions Interventions         Discussed follow-up appointments. If no appointment was previously scheduled, appointment scheduling offered: Yes. Is follow up appointment scheduled within 7 days of discharge? No, declines to schedule a follow up with PCP. Follow Up  Future Appointments   Date Time Provider [address] [place] [address]   11/21/2023  1:00 PM Patricia Porras DO [facility]   12/21/2023  1:00 PM Blanca Rider MD [address] Transition Nurse provided contact information. Plan for follow-up call in 5-7 days based on severity of symptoms and risk factors.   Plan for next call: symptom management-.  self management-.  follow-up appointment-.    Kenna Phoenix RN

## 2023-07-19 ENCOUNTER — CARE COORDINATION (OUTPATIENT)
Dept: CASE MANAGEMENT | Age: 88
End: 2023-07-19

## 2023-07-19 NOTE — CARE COORDINATION
[address] [facility] 250 Care Transitions Follow Up Call    Patient Current Location:  Home: [address] [place] [address]    Care Transition Nurse contacted the patient by telephone. Verified name and  with patient as identifiers. Patient: Sherif Monday  Patient : 1930   MRN: [id number]  Reason for Admission: removal of foreign body- tooth crown  Discharge Date: 23 RARS: Readmission Risk Score: 11.8      Needs to be reviewed by the provider   Additional needs identified to be addressed with provider: No  none             Method of communication with provider: none. Patient reports that he is doing very well, denies any pain and/or fever. He has not followed up with dentist, will schedule a follow up for root canal of remaining tooth and cleaning. Patient denies any questions or concerns at this time. CTN will continue with outreach follow up calls. Follow Up  Future Appointments   Date Time Provider [address]   2023  1:00 PM Hetal Talavera DO  Cardio [facility]   2023  1:00 PM Ruth Tavarez MD [facility]     Non-[facility] follow up appointment(s):     Care Transition Nurse reviewed red flags with patient and discussed any barriers to care and/or understanding of plan of care after discharge. Discussed appropriate site of care based on symptoms and resources available to patient including: PCP  Urgent care clinics  When to call 911. The patient agrees to contact the PCP office for questions related to their healthcare. Advance Care Planning:   reviewed and current. Offered patient enrollment in the Remote Patient Monitoring (RPM) program for in-home monitoring: Will offer on follow up call .  HTN, HF, COPD     Care Transitions Subsequent and Final Call    Subsequent and Final Calls  Do you have any ongoing symptoms?: No  Have your medications changed?: No  Do you have any questions related to your medications?: No  Do you currently have any active services?:

## 2023-07-26 ENCOUNTER — CARE COORDINATION (OUTPATIENT)
Dept: CASE MANAGEMENT | Age: 88
End: 2023-07-26

## 2023-08-02 ENCOUNTER — CARE COORDINATION (OUTPATIENT)
Dept: CASE MANAGEMENT | Age: 88
End: 2023-08-02

## 2023-08-02 NOTE — CARE COORDINATION
[facility] Care Transitions Follow Up Call    Patient Current Location:  Home: [address] [place] [address]    Care Transition Nurse contacted the patient by telephone. Verified name and  with patient as identifiers. Patient: Chong Essex  Patient : 1930   MRN: [id number]  Reason for Admission: removal of foreign body- tooth crown  Discharge Date: 23 RARS: Readmission Risk Score: 11.8      Needs to be reviewed by the provider   Additional needs identified to be addressed with provider: No  none             Method of communication with provider: none. Final outreach call:  Patient reports that he followed up with dentist today and had his teeth cleaned. He will be returning for a couple of fillings. He denies any concerns at this time. CTN will resolve episode and remain available. Follow Up  Future Appointments   Date Time Provider [address]   2023  1:00 PM Hernesto Krueger DO [facility]   2023  1:00 PM Kat Grant MD [address]   patient  [address] follow up appointment(s):     Care Transition Nurse reviewed red flags with patient and discussed any barriers to care and/or understanding of plan of care after discharge. Discussed appropriate site of care based on symptoms and resources available to patient including: PCP  Urgent care clinics  When to call 911. The  agrees to contact the PCP office for questions related to their healthcare. Advance Care Planning:   reviewed and current. Offered patient enrollment in the Remote Patient Monitoring (RPM) program for in-home monitoring: Patient declined.      Care Transitions Subsequent and Final Call    Subsequent and Final Calls  Do you have any ongoing symptoms?: No  Have your medications changed?: No  Do you have any questions related to your medications?: No  Do you currently have any active services?: No  Do you have any needs or concerns that I can assist you with?: No  Identified

## 2023-08-21 ENCOUNTER — TELEPHONE (OUTPATIENT)
Dept: FAMILY MEDICINE CLINIC | Age: 88
End: 2023-08-21

## 2023-09-11 DIAGNOSIS — M25.512 CHRONIC LEFT SHOULDER PAIN: Primary | ICD-10-CM

## 2023-09-11 DIAGNOSIS — G89.29 CHRONIC LEFT SHOULDER PAIN: Primary | ICD-10-CM

## 2023-09-11 DIAGNOSIS — M15.9 PRIMARY OSTEOARTHRITIS INVOLVING MULTIPLE JOINTS: ICD-10-CM

## 2023-09-11 NOTE — TELEPHONE ENCOUNTER
LAST VISIT 06/21/2023 WITH DR TAYLOR, NEXT VISIT 12/21/2023 WITH DR TAYLOR. [address]     Drug Panel-PM-HI Res-UR Interp-A  Order: [id number]  Status: Final result     Visible to patient: Yes (not seen)     Next appt: 11/21/2023 at 01:00 PM in Cardiology [facility]     Dx: Long term use of drug     0 Result Notes        Component Ref Range & Units 12/21/22 1458 4/23/21 1200   Drugs Expected  UNK [1]     Pain Management Drug Panel  Consistent [facility]     Comment: ________________________________________________________________   DRUGS EXPECTED:   ULTRAM (TRAMADOL) [PRN]   ________________________________________________________________   CONSISTENT with medications provided:   ULTRAM (TRAMADOL) : based on immunoassay detection   ________________________________________________________________   INTERPRETIVE INFORMATION: Targeted drug profile Interp   Interpretation depends on accuracy and completeness of patient   medication   information submitted by client.     Creatinine, Ur 20.0 - 400.0 mg/dL 78.2 [facility]     Codeine  Not Detected [facility]     Morphine  Not Detected [facility]     6-Acetylmorphine  Not Detected [facility]     Oxycodone  Not Detected [facility]     Noroxycodone  Not Detected [facility]     Oxymorphone  Not Detected [facility]     Noroxymorphone, Urine  Not Detected [facility]     Hydrocodone  Not Detected [facility]     Norhydrocodone, Urine  Not Detected [facility]     Hydromorphone  Not Detected [facility]     Naloxone  Not Detected [facility]     Buprenorphine  Not Detected [facility]     Norbuprenorphine  Not Detected [facility]     Fentanyl  Not Detected [facility]     Norfentanyl  Not Detected [facility]     Meperidine  Not Detected [facility]     Tapentadol, Urine  Not Detected [facility]     Tapentadol-O-Sulfate, Urine  Not Detected [facility]     Methadone  Not Detected [facility]     Tramadol  Present [facility]     Amphetamine  Not Detected [facility]     Methamphetamine  Not Detected [facility]     MDMA, Urine  Not Detected [facility]  NEG    MDA  Not Detected [facility]     MDEA  Not Detected [facility]     Methylphenidate  Not

## 2023-09-13 RX ORDER — TRAMADOL HYDROCHLORIDE 50 MG/1
TABLET ORAL
Qty: 28 TABLET | Refills: 0 | Status: SHIPPED | OUTPATIENT
Start: 2023-09-13 | End: 2023-10-13

## 2023-11-01 ENCOUNTER — OFFICE VISIT (OUTPATIENT)
Dept: FAMILY MEDICINE CLINIC | Age: 88
End: 2023-11-01
Payer: MEDICARE

## 2023-11-01 VITALS
OXYGEN SATURATION: 98 % | SYSTOLIC BLOOD PRESSURE: 122 MMHG | BODY MASS INDEX: 33.15 KG/M2 | HEART RATE: 80 BPM | WEIGHT: 199 LBS | DIASTOLIC BLOOD PRESSURE: 68 MMHG | HEIGHT: 65 IN | TEMPERATURE: 98.2 F

## 2023-11-01 DIAGNOSIS — Z12.5 ENCOUNTER FOR SCREENING FOR MALIGNANT NEOPLASM OF PROSTATE: ICD-10-CM

## 2023-11-01 DIAGNOSIS — J44.9 COPD, MILD (HCC): ICD-10-CM

## 2023-11-01 DIAGNOSIS — I50.32 CHRONIC HEART FAILURE WITH PRESERVED EJECTION FRACTION (HCC): Primary | ICD-10-CM

## 2023-11-01 DIAGNOSIS — Z23 NEED FOR VACCINATION: ICD-10-CM

## 2023-11-01 DIAGNOSIS — R97.20 ELEVATED PROSTATE SPECIFIC ANTIGEN (PSA): ICD-10-CM

## 2023-11-01 DIAGNOSIS — I47.19 PAROXYSMAL ATRIAL TACHYCARDIA: ICD-10-CM

## 2023-11-01 DIAGNOSIS — T14.8XXA EXCORIATION: ICD-10-CM

## 2023-11-01 DIAGNOSIS — I10 BENIGN ESSENTIAL HYPERTENSION: ICD-10-CM

## 2023-11-01 PROCEDURE — 1123F ACP DISCUSS/DSCN MKR DOCD: CPT | Performed by: STUDENT IN AN ORGANIZED HEALTH CARE EDUCATION/TRAINING PROGRAM

## 2023-11-01 PROCEDURE — G0009 ADMIN PNEUMOCOCCAL VACCINE: HCPCS | Performed by: STUDENT IN AN ORGANIZED HEALTH CARE EDUCATION/TRAINING PROGRAM

## 2023-11-01 PROCEDURE — 99215 OFFICE O/P EST HI 40 MIN: CPT | Performed by: STUDENT IN AN ORGANIZED HEALTH CARE EDUCATION/TRAINING PROGRAM

## 2023-11-01 PROCEDURE — 90677 PCV20 VACCINE IM: CPT | Performed by: STUDENT IN AN ORGANIZED HEALTH CARE EDUCATION/TRAINING PROGRAM

## 2023-11-01 RX ORDER — TRAMADOL HYDROCHLORIDE 50 MG/1
50 TABLET ORAL EVERY 6 HOURS PRN
COMMUNITY

## 2023-11-01 SDOH — ECONOMIC STABILITY: INCOME INSECURITY: HOW HARD IS IT FOR YOU TO PAY FOR THE VERY BASICS LIKE FOOD, HOUSING, MEDICAL CARE, AND HEATING?: NOT HARD AT ALL

## 2023-11-01 SDOH — ECONOMIC STABILITY: FOOD INSECURITY: WITHIN THE PAST 12 MONTHS, THE FOOD YOU BOUGHT JUST DIDN'T LAST AND YOU DIDN'T HAVE MONEY TO GET MORE.: NEVER TRUE

## 2023-11-01 SDOH — ECONOMIC STABILITY: FOOD INSECURITY: WITHIN THE PAST 12 MONTHS, YOU WORRIED THAT YOUR FOOD WOULD RUN OUT BEFORE YOU GOT MONEY TO BUY MORE.: NEVER TRUE

## 2023-11-01 ASSESSMENT — PATIENT HEALTH QUESTIONNAIRE - PHQ9
SUM OF ALL RESPONSES TO PHQ9 QUESTIONS 1 & 2: 0
1. LITTLE INTEREST OR PLEASURE IN DOING THINGS: 0
SUM OF ALL RESPONSES TO PHQ QUESTIONS 1-9: 0
2. FEELING DOWN, DEPRESSED OR HOPELESS: 0

## 2023-11-01 ASSESSMENT — ENCOUNTER SYMPTOMS
DIARRHEA: 0
VOMITING: 0
ABDOMINAL PAIN: 0
NAUSEA: 0
COUGH: 0
SHORTNESS OF BREATH: 0

## 2023-11-01 NOTE — ASSESSMENT & PLAN NOTE
Chronic  -Using supplemental oxygen at night but not throughout the day  -Continue DuoNeb, ProAir  -Referral for pulmonology placed

## 2023-11-01 NOTE — ASSESSMENT & PLAN NOTE
Complains of itching all over with various excoriations on his skin.   No lesions look concerning at this time.  -Referral to dermatology placed

## 2023-11-01 NOTE — ASSESSMENT & PLAN NOTE
Patient brought with him paperwork which showed PSA elevated to 16. He has been seen for this in the past, negative prostate cancer work-up.    - Will obtain PSA

## 2023-11-01 NOTE — ASSESSMENT & PLAN NOTE
- BP today: 122/68, Goal BP: <150/90 per JNC8 Guidelines  - BP Log: No  - Current medication regimen: hydrochlorothiazide (HCTZ) and Telmisartan  - Diet/Exercise: No  - Tobacco Use: No  - Labs Reviewed:  BMP:   Lab Results   Component Value Date/Time     07/11/2023 05:00 AM    K 5.3 07/11/2023 05:00 AM     07/11/2023 05:00 AM    CO2 23 07/11/2023 05:00 AM    BUN 32 07/11/2023 05:00 AM    CREATININE 1.2 07/11/2023 05:00 AM    GLUCOSE 155 07/11/2023 05:00 AM    CALCIUM 9.0 07/11/2023 05:00 AM      Micro/Creatinine:   Lab Results   Component Value Date/Time    LABCREA 78.2 12/21/2022 02:58 PM      Lipid:   Lab Results   Component Value Date/Time    TRIG 93 11/15/2021 10:39 AM    HDL 48 11/15/2021 10:39 AM    HDL 41 01/31/2011 09:02 AM    LDLCALC 82 11/15/2021 10:39 AM    LABVLDL 19 11/15/2021 10:39 AM     - Baseline EKG Done?: No  Plan  -Continue Micardis

## 2023-11-01 NOTE — PROGRESS NOTES
2023    SUBJECTIVE  Chief Complaint   Patient presents with    New Patient     Concerns:Acute itching on scalp  Chronic Left thumb and wrist arthritis, chronic left shoulder pain. HX of COPD , uses O2 2l at bedtime. Matilde Khan (:  1930) is a 80 y.o. male w/ PMHx of HFpEF, COPD, HTN, osteoarthritis, paroxysmal Atach, elevated PSA presenting as a new patient to establish care. No acute concerns today. Patient Active Problem List   Diagnosis    Cramps, extremity    Benign essential hypertension    Elevated prostate specific antigen (PSA)    Cervical back pain with evidence of disc disease    SOB (shortness of breath)    Allergic sinusitis    Nose dryness    COPD, mild (HCC)    Diaphragm paralysis    Intercostal pain    Chronic hypoxemic respiratory failure (HCC)    Hearing disorder, sensorineural    Chest pain    Paroxysmal atrial tachycardia    Primary osteoarthritis involving multiple joints    Chronic heart failure with preserved ejection fraction ([address])    Foreign body aspiration, initial encounter    FB bronchus    Excoriation       Review of Systems   Constitutional:  Negative for chills, fatigue and fever. Respiratory:  Negative for cough and shortness of breath. Cardiovascular:  Negative for chest pain. Gastrointestinal:  Negative for abdominal pain, diarrhea, nausea and vomiting. Musculoskeletal:  Negative for arthralgias. Skin:  Negative for rash. Neurological:  Negative for dizziness, syncope, weakness, light-headedness, numbness and headaches. Prior to Visit Medications    Medication Sig Taking? Authorizing Provider   Menthol-Camphor (ICY HOT ADVANCED PAIN RELIEF EX) Apply topically Yes Maged Eduardo MD   Powders (GOLD BOND CORNSTARCH PLUS EX) Apply topically Yes Maged Eduardo MD   traMADol (ULTRAM) 50 MG tablet Take 1 tablet by mouth every 6 hours as needed for Pain.  Max Daily Amount: 200 mg Yes Maged Eduardo MD   allopurinol

## 2023-11-01 NOTE — ASSESSMENT & PLAN NOTE
Stable  - Patient reported an episode a few weeks ago where he was checking his blood pressure and he thought that his heart was skipping beats on the cuff. He was not evaluated for this and the symptom has since resolved.    -Follow-up with cardiology later this month

## 2023-11-17 PROBLEM — E78.2 MIXED HYPERLIPIDEMIA: Status: ACTIVE | Noted: 2023-11-17

## 2023-11-17 ASSESSMENT — ENCOUNTER SYMPTOMS
ABDOMINAL PAIN: 0
BLOOD IN STOOL: 0
NAUSEA: 0
ABDOMINAL DISTENTION: 0
CHEST TIGHTNESS: 0
SHORTNESS OF BREATH: 1
PHOTOPHOBIA: 0
COUGH: 0

## 2023-11-17 NOTE — PROGRESS NOTES
[address] [facility]  11/21/23  Referring: Dr. Nicole Mora CONSULT/CHIEF COMPLAINT/HPI     Reason for visit/ Chief complaint  Follow up   Atrial tachycardia, risk factor modification   HPI Severa Spoon is a 80 y.o. male with a history of atrial tachycardia, hypertension, hyperlipidemia. Follows with Dr Mala Rico for diaphragm paralysis, copd. Today, Mr. Bry Plata states that he is now seeing Dr. Emerita Capellan. Reports that he walks around everyday in his home and then goes to [address] [place] [address] to walk as well. Does have some right hip pain with walking. He takes his BP about 9P every night. Reports that it runs about 160 sometimes on an automatic wrist BP monitor. Patient denies exertional chest pain, FORTUNE/PND, palpitations, light-headedness, edema. Patient is compliant with medications and is tolerating them well without side effects. HISTORY/ALLERGIES/ROS     MedHx:  has a past medical history of Afib ([address]), Anxiety, Atrial fibrillation ([address]), Benign essential hypertension, Cervical back pain with evidence of disc disease, Cramps, extremity, Elevated prostate specific antigen (PSA), Emphysema, Hypercholesteremia, and Kidney stones. SurgHx:  has a past surgical history that includes Throat surgery; eye surgery; hernia repair; Colonoscopy; Appendectomy; Vasectomy; Knee arthroscopy (Bilateral); TURP (2007); Cochlear implant (2017); Carpal tunnel release (Bilateral); and bronchoscopy (N/A, 7/10/2023). SocHx:  reports that he has never smoked. He has never been exposed to tobacco smoke. He has never used smokeless tobacco. He reports that he does not drink alcohol and does not use drugs. FamHx: No family history of premature coronary artery disease, sudden death, or aneurysm  Allergies: Codeine and Timolol maleate   ROS:   Review of Systems   Constitutional:  Positive for fatigue. Negative for activity change, diaphoresis and fever. HENT:  Negative for congestion and ear discharge.

## 2023-11-17 NOTE — PATIENT INSTRUCTIONS
No medication changes  Regular exercise - recommend aerobic exercise to help with hip pain  Follow up in 1 year

## 2023-11-21 ENCOUNTER — HOSPITAL ENCOUNTER (OUTPATIENT)
Age: 88
Discharge: HOME | End: 2023-11-21
Payer: MEDICARE

## 2023-11-21 ENCOUNTER — OFFICE VISIT (OUTPATIENT)
Dept: CARDIOLOGY CLINIC | Age: 88
End: 2023-11-21
Payer: MEDICARE

## 2023-11-21 VITALS
DIASTOLIC BLOOD PRESSURE: 60 MMHG | SYSTOLIC BLOOD PRESSURE: 120 MMHG | HEART RATE: 95 BPM | RESPIRATION RATE: 22 BRPM | WEIGHT: 202.4 LBS | BODY MASS INDEX: 33.72 KG/M2 | OXYGEN SATURATION: 97 % | HEIGHT: 65 IN

## 2023-11-21 DIAGNOSIS — I50.32 CHRONIC HEART FAILURE WITH PRESERVED EJECTION FRACTION (HCC): ICD-10-CM

## 2023-11-21 DIAGNOSIS — E78.2 MIXED HYPERLIPIDEMIA: ICD-10-CM

## 2023-11-21 DIAGNOSIS — I47.19 PAROXYSMAL ATRIAL TACHYCARDIA: ICD-10-CM

## 2023-11-21 DIAGNOSIS — R97.20 ELEVATED PROSTATE SPECIFIC ANTIGEN (PSA): ICD-10-CM

## 2023-11-21 DIAGNOSIS — I50.32 CHRONIC HEART FAILURE WITH PRESERVED EJECTION FRACTION (HCC): Primary | ICD-10-CM

## 2023-11-21 DIAGNOSIS — I10 BENIGN ESSENTIAL HYPERTENSION: ICD-10-CM

## 2023-11-21 DIAGNOSIS — Z12.5 ENCOUNTER FOR SCREENING FOR MALIGNANT NEOPLASM OF PROSTATE: ICD-10-CM

## 2023-11-21 LAB
ALBUMIN SERPL-MCNC: 4.4 G/DL (ref 3.4–5)
ALBUMIN/GLOB SERPL: 1.5 {RATIO} (ref 1.1–2.2)
ALP SERPL-CCNC: 85 U/L (ref 40–129)
ALT SERPL-CCNC: 13 U/L (ref 10–40)
ANION GAP SERPL CALCULATED.3IONS-SCNC: 15 MMOL/L (ref 3–16)
AST SERPL-CCNC: 15 U/L (ref 15–37)
BILIRUB SERPL-MCNC: 0.7 MG/DL (ref 0–1)
BUN SERPL-MCNC: 31 MG/DL (ref 7–20)
CALCIUM SERPL-MCNC: 9.9 MG/DL (ref 8.3–10.6)
CHLORIDE SERPL-SCNC: 99 MMOL/L (ref 99–110)
CHOLEST SERPL-MCNC: 139 MG/DL (ref 0–199)
CO2 SERPL-SCNC: 30 MMOL/L (ref 21–32)
CREAT SERPL-MCNC: 1.2 MG/DL (ref 0.8–1.3)
DEPRECATED RDW RBC AUTO: 13.6 % (ref 12.4–15.4)
GFR SERPLBLD CREATININE-BSD FMLA CKD-EPI: 56 ML/MIN/{1.73_M2}
GLUCOSE SERPL-MCNC: 100 MG/DL (ref 70–99)
HCT VFR BLD AUTO: 41.1 % (ref 40.5–52.5)
HDLC SERPL-MCNC: 43 MG/DL (ref 40–60)
HGB BLD-MCNC: 13.7 G/DL (ref 13.5–17.5)
LDLC SERPL CALC-MCNC: 72 MG/DL
MCH RBC QN AUTO: 30.8 PG (ref 26–34)
MCHC RBC AUTO-ENTMCNC: 33.3 G/DL (ref 31–36)
MCV RBC AUTO: 92.6 FL (ref 80–100)
PLATELET # BLD AUTO: 246 K/UL (ref 135–450)
PMV BLD AUTO: 9 FL (ref 5–10.5)
POTASSIUM SERPL-SCNC: 4.6 MMOL/L (ref 3.5–5.1)
PROT SERPL-MCNC: 7.3 G/DL (ref 6.4–8.2)
PSA SERPL DL<=0.01 NG/ML-MCNC: 20.14 NG/ML (ref 0–4)
RBC # BLD AUTO: 4.43 M/UL (ref 4.2–5.9)
SODIUM SERPL-SCNC: 144 MMOL/L (ref 136–145)
TRIGL SERPL-MCNC: 118 MG/DL (ref 0–150)
VLDLC SERPL CALC-MCNC: 24 MG/DL
WBC # BLD AUTO: 6.1 K/UL (ref 4–11)

## 2023-11-21 PROCEDURE — 85027 COMPLETE CBC AUTOMATED: CPT

## 2023-11-21 PROCEDURE — 84153 ASSAY OF PSA TOTAL: CPT

## 2023-11-21 PROCEDURE — 80061 LIPID PANEL: CPT

## 2023-11-21 PROCEDURE — 36415 COLL VENOUS BLD VENIPUNCTURE: CPT

## 2023-11-21 PROCEDURE — 80053 COMPREHEN METABOLIC PANEL: CPT

## 2023-11-21 PROCEDURE — 99214 OFFICE O/P EST MOD 30 MIN: CPT | Performed by: INTERNAL MEDICINE

## 2023-11-21 PROCEDURE — 1123F ACP DISCUSS/DSCN MKR DOCD: CPT | Performed by: INTERNAL MEDICINE

## 2023-11-21 RX ORDER — DILTIAZEM HYDROCHLORIDE 60 MG/1
60 CAPSULE, EXTENDED RELEASE ORAL 2 TIMES DAILY
Qty: 90 CAPSULE | Refills: 3 | Status: SHIPPED | OUTPATIENT
Start: 2023-11-21

## 2023-11-21 RX ORDER — FUROSEMIDE 20 MG/1
20 TABLET ORAL DAILY
Qty: 90 TABLET | Refills: 3 | Status: SHIPPED | OUTPATIENT
Start: 2023-11-21

## 2023-11-21 RX ORDER — PRAVASTATIN SODIUM 40 MG
40 TABLET ORAL EVERY OTHER DAY
Qty: 45 TABLET | Refills: 3 | Status: SHIPPED | OUTPATIENT
Start: 2023-11-21

## 2023-11-21 RX ORDER — TELMISARTAN AND HYDROCHLORTHIAZIDE 80; 25 MG/1; MG/1
1 TABLET ORAL DAILY
Qty: 90 TABLET | Refills: 3 | Status: SHIPPED | OUTPATIENT
Start: 2023-11-21

## 2023-12-04 RX ORDER — TRAMADOL HYDROCHLORIDE 50 MG/1
50 TABLET ORAL EVERY 6 HOURS PRN
Status: CANCELLED | OUTPATIENT
Start: 2023-12-04 | End: 2024-01-03

## 2023-12-04 NOTE — TELEPHONE ENCOUNTER
Medication:   Requested Prescriptions     Pending Prescriptions Disp Refills    traMADol (ULTRAM) 50 MG tablet       Sig: Take 1 tablet by mouth every 6 hours as needed for Pain for up to 30 days. Max Daily Amount: 200 mg      Last Filled:  last Rx was just added historically, unsure of last refill date. Provider out of office.      Patient Phone Number: [phone] (home)     Last appt: 11/1/2023   Next appt: 2/23/2024    Last OARRS:        No data to display              PDMP Monitoring:    Last PDMP Anya Nola as Reviewed [facility]):  Review User Review Instant Review Result   Ricky Guzmán 9/13/2023  2:39 PM Reviewed PDMP [1]     Preferred Pharmacy:   [facility] [address] [place] [id number]  Phone: [phone] Fax: [phone]    [address] [place] [address], [address] [place] [address],[address] [place] [address] [place] [address]  Phone: [phone] Fax: [phone]

## 2023-12-04 NOTE — TELEPHONE ENCOUNTER
traMADol (ULTRAM) 50 MG tablet     [facility] [address] [facility], [address] [place] [address] [[id number]]

## 2023-12-05 NOTE — TELEPHONE ENCOUNTER
Called patient to set up office visit to discuss back pain and pain medication, appt 12/6/23 @9:45am

## 2023-12-06 ENCOUNTER — OFFICE VISIT (OUTPATIENT)
Dept: FAMILY MEDICINE CLINIC | Age: 88
End: 2023-12-06
Payer: MEDICARE

## 2023-12-06 VITALS
WEIGHT: 200.6 LBS | BODY MASS INDEX: 33.9 KG/M2 | TEMPERATURE: 98.2 F | SYSTOLIC BLOOD PRESSURE: 130 MMHG | HEART RATE: 81 BPM | DIASTOLIC BLOOD PRESSURE: 72 MMHG | OXYGEN SATURATION: 98 %

## 2023-12-06 DIAGNOSIS — M50.90 CERVICAL BACK PAIN WITH EVIDENCE OF DISC DISEASE: ICD-10-CM

## 2023-12-06 DIAGNOSIS — M15.9 PRIMARY OSTEOARTHRITIS INVOLVING MULTIPLE JOINTS: Primary | ICD-10-CM

## 2023-12-06 PROCEDURE — 99213 OFFICE O/P EST LOW 20 MIN: CPT | Performed by: STUDENT IN AN ORGANIZED HEALTH CARE EDUCATION/TRAINING PROGRAM

## 2023-12-06 PROCEDURE — 1123F ACP DISCUSS/DSCN MKR DOCD: CPT | Performed by: STUDENT IN AN ORGANIZED HEALTH CARE EDUCATION/TRAINING PROGRAM

## 2023-12-06 RX ORDER — TRAMADOL HYDROCHLORIDE 50 MG/1
50 TABLET ORAL EVERY 8 HOURS PRN
Qty: 90 TABLET | Refills: 0 | Status: SHIPPED | OUTPATIENT
Start: 2023-12-06 | End: 2024-01-05

## 2023-12-06 RX ORDER — TRAMADOL HYDROCHLORIDE 50 MG/1
50 TABLET ORAL EVERY 6 HOURS PRN
Qty: 120 TABLET | Refills: 0 | Status: SHIPPED | OUTPATIENT
Start: 2023-12-06 | End: 2023-12-06

## 2023-12-06 ASSESSMENT — ENCOUNTER SYMPTOMS
COUGH: 0
ABDOMINAL PAIN: 0
NAUSEA: 0
SHORTNESS OF BREATH: 0
DIARRHEA: 0
VOMITING: 0

## 2023-12-21 NOTE — TELEPHONE ENCOUNTER
Received refill request for potassium chloride (KLOR-CON M) 10 MEQ extended release tablet  from [facility].      Last OV: 11- DKW    Next OV: 12- DKW    Last Labs: 11- PSA/Lipid/CBC/CMP    Last Filled: 6-8-2023 DKW

## 2023-12-26 RX ORDER — POTASSIUM CHLORIDE 750 MG/1
10 TABLET, EXTENDED RELEASE ORAL DAILY
Qty: 90 TABLET | Refills: 0 | Status: SHIPPED | OUTPATIENT
Start: 2023-12-26

## 2023-12-28 ENCOUNTER — OFFICE VISIT (OUTPATIENT)
Dept: FAMILY MEDICINE CLINIC | Age: 88
End: 2023-12-28
Payer: MEDICARE

## 2023-12-28 VITALS
HEART RATE: 86 BPM | TEMPERATURE: 98.6 F | BODY MASS INDEX: 34.44 KG/M2 | DIASTOLIC BLOOD PRESSURE: 66 MMHG | OXYGEN SATURATION: 95 % | SYSTOLIC BLOOD PRESSURE: 108 MMHG | WEIGHT: 203.8 LBS

## 2023-12-28 DIAGNOSIS — I50.32 CHRONIC HEART FAILURE WITH PRESERVED EJECTION FRACTION (HCC): ICD-10-CM

## 2023-12-28 DIAGNOSIS — R97.20 ELEVATED PROSTATE SPECIFIC ANTIGEN (PSA): ICD-10-CM

## 2023-12-28 DIAGNOSIS — J44.9 COPD, MILD (HCC): ICD-10-CM

## 2023-12-28 DIAGNOSIS — Z76.89 ENCOUNTER TO ESTABLISH CARE: Primary | ICD-10-CM

## 2023-12-28 DIAGNOSIS — J96.11 CHRONIC HYPOXEMIC RESPIRATORY FAILURE (HCC): ICD-10-CM

## 2023-12-28 DIAGNOSIS — N18.31 STAGE 3A CHRONIC KIDNEY DISEASE (HCC): ICD-10-CM

## 2023-12-28 DIAGNOSIS — E78.2 MIXED HYPERLIPIDEMIA: ICD-10-CM

## 2023-12-28 DIAGNOSIS — M15.9 PRIMARY OSTEOARTHRITIS INVOLVING MULTIPLE JOINTS: ICD-10-CM

## 2023-12-28 DIAGNOSIS — I10 BENIGN ESSENTIAL HYPERTENSION: ICD-10-CM

## 2023-12-28 PROBLEM — T17.908A FOREIGN BODY ASPIRATION, INITIAL ENCOUNTER: Status: RESOLVED | Noted: 2023-07-10 | Resolved: 2023-12-28

## 2023-12-28 PROBLEM — R07.9 CHEST PAIN: Status: RESOLVED | Noted: 2021-04-10 | Resolved: 2023-12-28

## 2023-12-28 PROBLEM — T14.8XXA EXCORIATION: Status: RESOLVED | Noted: 2023-11-01 | Resolved: 2023-12-28

## 2023-12-28 PROBLEM — T17.508A: Status: RESOLVED | Noted: 2023-07-10 | Resolved: 2023-12-28

## 2023-12-28 PROCEDURE — 99215 OFFICE O/P EST HI 40 MIN: CPT | Performed by: STUDENT IN AN ORGANIZED HEALTH CARE EDUCATION/TRAINING PROGRAM

## 2023-12-28 PROCEDURE — 1123F ACP DISCUSS/DSCN MKR DOCD: CPT | Performed by: STUDENT IN AN ORGANIZED HEALTH CARE EDUCATION/TRAINING PROGRAM

## 2023-12-28 NOTE — PROGRESS NOTES
Office Note: [facility]  2023  Patient Name: Aashish Alexander  MRN: [id number] : 1930    SUBJECTIVE:     CHIEF COMPLAINT:  Chief Complaint   Patient presents with    [facility]     No concerns today. HISTORY OF PRESENT ILLNESS:  Patient is a 80 y.o. male with a PMH of HFpEF, hypertension, elevated PSA, COPD, chronic pain, OA and HLD who presents today to [facility]. Previously seen by Dr. Tyshawn Newsome but reestablishing with me.     HFpEF, hypertension:  - Managed by Dr. Vasquez Manual with cardiology  - Currently on diltiazem 60 mg (also has paroxysmal atrial tachycardia), Lasix 20 mg, Telmisartan-HCTZ 80-25 mg and K+ supplementation  - Last echo in 2021, EF 60%  - Does not routinely check BP at home, says it is always good    Elevated PSA: chronic issue, has been worked up numerous times in the past  - Dr. Tyshawn Newsome did recheck PSA level in November which increased from 16 to 20  - Patient is not concerned by this uptrend, does not want to see urology again    COPD:  - Patient reports intermittent episodes of shortness of breath  - He will have instances in the middle of the night where he feels short of breath but says he will use his nebulizer and has no further issues  - Not interested in starting a daily inhaler like Spiriva.  - Has also been diagnosed with chronic hypoxemic respiratory failure    Chronic Pain/OA:  - Has taken Tramadol 50 mg for 25-30 years    CKD3a: GFR has been stable at 56 for the past several months    HLD: stable on Pravastatin 40 mg    Past Medical History:  Past Medical History:   Diagnosis Date    Afib ([address])     Anxiety     Atrial fibrillation ([address])     Benign essential hypertension     Cervical back pain with evidence of disc disease     Cramps, extremity     Diaphragm paralysis 2016    Elevated prostate specific antigen (PSA)     history of    Emphysema     Hypercholesteremia     Kidney stones      Past Surgical History:  Past Surgical History:   Procedure normal...

## 2024-01-26 RX ORDER — IPRATROPIUM BROMIDE AND ALBUTEROL SULFATE 2.5; .5 MG/3ML; MG/3ML
1 SOLUTION RESPIRATORY (INHALATION) EVERY 6 HOURS PRN
Qty: 360 ML | Refills: 1 | Status: SHIPPED | OUTPATIENT
Start: 2024-01-26

## 2024-01-26 NOTE — TELEPHONE ENCOUNTER
ipratropium-albuterol (DUONEB) 0.5-2.5 (3) MG/3ML SOLN nebulizer solution     [facility] #[id number] - [place], [place] - [address] - P [phone] - F [phone] [address] [facility] [address]  Phone: [phone]  Fax: [phone]

## 2024-01-26 NOTE — TELEPHONE ENCOUNTER
Medication:   Requested Prescriptions     Pending Prescriptions Disp Refills    ipratropium 0.5 mg-albuterol 2.5 mg (DUONEB) 0.5-2.5 (3) MG/3ML SOLN nebulizer solution 360 mL 1     Sig: Take 3 mLs by nebulization every 6 hours as needed for Shortness of Breath      Last Filled:      Patient Phone Number: [phone] (home)     Last appt: 12/28/2023   Next appt: 3/29/2024    Last OARRS:       12/6/2023    10:28 AM   RX Monitoring   Periodic Controlled Substance Monitoring Possible medication side effects, risk of tolerance/dependence & alternative treatments discussed.     PDMP Monitoring:    Last PDMP Bill as Reviewed ([place]):  Review User Review Instant Review Result   PAIGE KANG 12/6/2023 12:25 PM Reviewed PDMP [1]     Preferred Pharmacy:   [facility] #[id number] - [place], [place] - [address] [facility] [phone] - F [phone] [address] [facility] [address]  Phone: [phone] Fax: [phone]

## 2024-03-08 DIAGNOSIS — M15.9 PRIMARY OSTEOARTHRITIS INVOLVING MULTIPLE JOINTS: ICD-10-CM

## 2024-03-08 DIAGNOSIS — M50.90 CERVICAL BACK PAIN WITH EVIDENCE OF DISC DISEASE: ICD-10-CM

## 2024-03-08 RX ORDER — TRAMADOL HYDROCHLORIDE 50 MG/1
50 TABLET ORAL EVERY 8 HOURS PRN
Qty: 90 TABLET | Refills: 0 | Status: SHIPPED | OUTPATIENT
Start: 2024-03-08 | End: 2024-04-07

## 2024-03-08 NOTE — TELEPHONE ENCOUNTER
Medication:   Requested Prescriptions      No prescriptions requested or ordered in this encounter      Last Filled:  12/6/2023    Patient Phone Number: [phone] (home)     Last appt: Visit date not found   Next appt: Visit date not found    Last OARRS:       12/6/2023    10:28 AM   RX Monitoring   Periodic Controlled Substance Monitoring Possible medication side effects, risk of tolerance/dependence & alternative treatments discussed.     PDMP Monitoring:    Last PDMP Bill as Reviewed ([place]):  Review User Review Instant Review Result   ZELALEM KANG 12/6/2023 12:25 PM Reviewed PDMP [1]     Preferred Pharmacy:   [facility] #[id number] - [place], [place] - [address] - P [phone] - F [phone] [address]  [facility] [address]  Phone: [phone] Fax: [phone]    Recent Visits  Date Type Provider Dept   12/28/23 Office Visit Eleonora Wilburn DO [facility]   12/06/23 Office Visit Zelalem Kang MD [facility]   11/01/23 Office Visit Zelalem Kang MD [facility]   06/21/23 Office Visit Javy Sams MD [facility]   02/21/23 Office Visit Javy Sams MD [facility]   12/21/22 Office Visit Javy Sams MD [facility]   Showing recent visits within past 540 days with a meds authorizing provider and meeting all other requirements  Future Appointments  Date Type Provider Dept   03/29/24 Appointment Eleonora Wilburn DO [facility]   Showing future appointments within next 150 days with a meds authorizing provider and meeting all other requirements     Visit date not found

## 2024-03-12 ENCOUNTER — OFFICE VISIT (OUTPATIENT)
Dept: FAMILY MEDICINE CLINIC | Age: 89
End: 2024-03-12
Payer: MEDICARE

## 2024-03-12 VITALS
TEMPERATURE: 97.8 F | OXYGEN SATURATION: 96 % | HEART RATE: 78 BPM | SYSTOLIC BLOOD PRESSURE: 114 MMHG | DIASTOLIC BLOOD PRESSURE: 66 MMHG

## 2024-03-12 DIAGNOSIS — F51.01 PRIMARY INSOMNIA: Primary | ICD-10-CM

## 2024-03-12 DIAGNOSIS — I95.1 ORTHOSTATIC HYPOTENSION: ICD-10-CM

## 2024-03-12 PROCEDURE — 1123F ACP DISCUSS/DSCN MKR DOCD: CPT | Performed by: STUDENT IN AN ORGANIZED HEALTH CARE EDUCATION/TRAINING PROGRAM

## 2024-03-12 PROCEDURE — 99214 OFFICE O/P EST MOD 30 MIN: CPT | Performed by: STUDENT IN AN ORGANIZED HEALTH CARE EDUCATION/TRAINING PROGRAM

## 2024-03-12 RX ORDER — LANOLIN ALCOHOL/MO/W.PET/CERES
3 CREAM (GRAM) TOPICAL NIGHTLY PRN
Qty: 30 TABLET | Refills: 0 | Status: SHIPPED | OUTPATIENT
Start: 2024-03-12

## 2024-03-12 ASSESSMENT — PATIENT HEALTH QUESTIONNAIRE - PHQ9
9. THOUGHTS THAT YOU WOULD BE BETTER OFF DEAD, OR OF HURTING YOURSELF: 0
7. TROUBLE CONCENTRATING ON THINGS, SUCH AS READING THE NEWSPAPER OR WATCHING TELEVISION: 0
4. FEELING TIRED OR HAVING LITTLE ENERGY: 2
2. FEELING DOWN, DEPRESSED OR HOPELESS: 2
1. LITTLE INTEREST OR PLEASURE IN DOING THINGS: 2
10. IF YOU CHECKED OFF ANY PROBLEMS, HOW DIFFICULT HAVE THESE PROBLEMS MADE IT FOR YOU TO DO YOUR WORK, TAKE CARE OF THINGS AT HOME, OR GET ALONG WITH OTHER PEOPLE: 1
3. TROUBLE FALLING OR STAYING ASLEEP: 2
SUM OF ALL RESPONSES TO PHQ QUESTIONS 1-9: 11
5. POOR APPETITE OR OVEREATING: 2
SUM OF ALL RESPONSES TO PHQ QUESTIONS 1-9: 11
SUM OF ALL RESPONSES TO PHQ9 QUESTIONS 1 & 2: 4
SUM OF ALL RESPONSES TO PHQ QUESTIONS 1-9: 11
SUM OF ALL RESPONSES TO PHQ QUESTIONS 1-9: 11
6. FEELING BAD ABOUT YOURSELF - OR THAT YOU ARE A FAILURE OR HAVE LET YOURSELF OR YOUR FAMILY DOWN: 1
8. MOVING OR SPEAKING SO SLOWLY THAT OTHER PEOPLE COULD HAVE NOTICED. OR THE OPPOSITE, BEING SO FIGETY OR RESTLESS THAT YOU HAVE BEEN MOVING AROUND A LOT MORE THAN USUAL: 0

## 2024-03-12 NOTE — PATIENT INSTRUCTIONS
Cut your medication Telmisartan-HCTZ in half for the next 2 weeks. Still take every day, but take half a pill.   - We'll follow up next time to make sure your blood pressure is okay.    Take Melatonin every night for the next 2 weeks. Take it about 1 hour before bedtime.

## 2024-03-12 NOTE — TELEPHONE ENCOUNTER
Received refill request for potassium from [facility] pharmacy.    Last ov: 11/21/2023 REECE    Last Refill: 12/26/2023 #90 w/ 0 refills    Next appointment: 12/03/2024 REECE

## 2024-03-12 NOTE — PROGRESS NOTES
Office Note  3/12/2024  Patient Name: Robin Hewitt  MRN: [id number] : 1930    SUBJECTIVE:     CHIEF COMPLAINT:  Chief Complaint   Patient presents with    Insomnia     Has not been able to sleep for last 3-4 days. Patient was given allopurinol and was having side effects, patient stopped taking it and is feeling better. But cannot sleep, patient stated he recently had vertigo. Patient did fall and bruised his arm, he has fallen about 2-3 more times. Patient will lay in bed all night long with no sleep.        HISTORY OF PRESENT ILLNESS:  He presents today with the following concerns:    Insomnia:   - Not able to sleep for the past few days  - Has lied awake \"all night long\"   - Taking Tylenol PM but not helping   - Unsure why he is not sleeping    Stopped taking allopurinol. Had been taking prophylactically against gout, however says he has not had a gout flare for 2 years and the allopurinol was causing side effects which have resolved since discontinuing it    Lightheadedness:  - Feeling more lightheaded recently, mostly when standing up after sitting down  - Blood pressures at home have been running on the low side  - Does not see cardiology again until November    Past Medical History:  Past Medical History:   Diagnosis Date    Afib (HCC)     Anxiety     Atrial fibrillation (HCC)     Benign essential hypertension     Cervical back pain with evidence of disc disease     Cramps, extremity     Diaphragm paralysis 2016    Elevated prostate specific antigen (PSA)     history of    Emphysema     Hypercholesteremia     Kidney stones      Past Surgical History:  Past Surgical History:   Procedure Laterality Date    APPENDECTOMY      BRONCHOSCOPY N/A 7/10/2023    BRONCHOSCOPY FOREIGN BODY REMOVAL performed by Bigg Cordon MD at [facility] ENDOSCOPY    CARPAL TUNNEL RELEASE Bilateral     COCHLEAR IMPLANT  2017    [facility]    COLONOSCOPY      EYE SURGERY      cataract

## 2024-03-13 RX ORDER — POTASSIUM CHLORIDE 750 MG/1
10 TABLET, EXTENDED RELEASE ORAL DAILY
Qty: 90 TABLET | Refills: 0 | Status: SHIPPED | OUTPATIENT
Start: 2024-03-13

## 2024-03-29 ENCOUNTER — OFFICE VISIT (OUTPATIENT)
Dept: FAMILY MEDICINE CLINIC | Age: 89
End: 2024-03-29
Payer: MEDICARE

## 2024-03-29 VITALS
OXYGEN SATURATION: 98 % | DIASTOLIC BLOOD PRESSURE: 72 MMHG | SYSTOLIC BLOOD PRESSURE: 120 MMHG | BODY MASS INDEX: 32.88 KG/M2 | WEIGHT: 192.6 LBS | TEMPERATURE: 98.1 F | HEART RATE: 82 BPM | HEIGHT: 64 IN

## 2024-03-29 DIAGNOSIS — F51.01 PRIMARY INSOMNIA: ICD-10-CM

## 2024-03-29 DIAGNOSIS — Z00.00 MEDICARE ANNUAL WELLNESS VISIT, SUBSEQUENT: Primary | ICD-10-CM

## 2024-03-29 DIAGNOSIS — I10 BENIGN ESSENTIAL HYPERTENSION: ICD-10-CM

## 2024-03-29 PROCEDURE — 1123F ACP DISCUSS/DSCN MKR DOCD: CPT | Performed by: STUDENT IN AN ORGANIZED HEALTH CARE EDUCATION/TRAINING PROGRAM

## 2024-03-29 PROCEDURE — 99213 OFFICE O/P EST LOW 20 MIN: CPT | Performed by: STUDENT IN AN ORGANIZED HEALTH CARE EDUCATION/TRAINING PROGRAM

## 2024-03-29 PROCEDURE — G0439 PPPS, SUBSEQ VISIT: HCPCS | Performed by: STUDENT IN AN ORGANIZED HEALTH CARE EDUCATION/TRAINING PROGRAM

## 2024-03-29 RX ORDER — TELMISARTAN AND HYDROCHLORTHIAZIDE 80; 25 MG/1; MG/1
0.5 TABLET ORAL DAILY
Qty: 90 TABLET | Refills: 3
Start: 2024-03-29

## 2024-03-29 SDOH — HEALTH STABILITY: PHYSICAL HEALTH: ON AVERAGE, HOW MANY MINUTES DO YOU ENGAGE IN EXERCISE AT THIS LEVEL?: 20 MIN

## 2024-03-29 SDOH — HEALTH STABILITY: PHYSICAL HEALTH: ON AVERAGE, HOW MANY DAYS PER WEEK DO YOU ENGAGE IN MODERATE TO STRENUOUS EXERCISE (LIKE A BRISK WALK)?: 2 DAYS

## 2024-03-29 ASSESSMENT — PATIENT HEALTH QUESTIONNAIRE - PHQ9
SUM OF ALL RESPONSES TO PHQ QUESTIONS 1-9: 0
2. FEELING DOWN, DEPRESSED OR HOPELESS: NOT AT ALL
SUM OF ALL RESPONSES TO PHQ QUESTIONS 1-9: 0
SUM OF ALL RESPONSES TO PHQ QUESTIONS 1-9: 0
1. LITTLE INTEREST OR PLEASURE IN DOING THINGS: NOT AT ALL
SUM OF ALL RESPONSES TO PHQ QUESTIONS 1-9: 0
SUM OF ALL RESPONSES TO PHQ9 QUESTIONS 1 & 2: 0

## 2024-03-29 ASSESSMENT — LIFESTYLE VARIABLES
HOW MANY STANDARD DRINKS CONTAINING ALCOHOL DO YOU HAVE ON A TYPICAL DAY: 0
HOW MANY STANDARD DRINKS CONTAINING ALCOHOL DO YOU HAVE ON A TYPICAL DAY: PATIENT DOES NOT DRINK
HOW OFTEN DO YOU HAVE A DRINK CONTAINING ALCOHOL: NEVER
HOW OFTEN DO YOU HAVE SIX OR MORE DRINKS ON ONE OCCASION: 1
HOW OFTEN DO YOU HAVE A DRINK CONTAINING ALCOHOL: 1

## 2024-03-29 NOTE — PROGRESS NOTES
MEDICARE ANNUAL WELLNESS VISIT    Patient is here for their Medicare Annual Wellness Visit     Last eye exam: Within the last year  Last dental exam: Within the last year  Exercise:  intermittently, 20 mins a day  Do you eat balanced/healthy meals regularly? Yes    How would you rate your overall health? : Good            3/29/2024     9:26 AM 11/1/2023    12:58 PM 2/21/2023     2:27 PM 12/21/2022    10:28 AM 8/19/2020     2:46 PM 4/30/2019    10:41 AM 3/23/2015     2:04 PM   Fall Risk   2 or more falls in past year? yes no yes no no no no   Fall with injury in past year? no no no no no yes no           3/29/2024     9:26 AM 3/12/2024     1:48 PM 11/1/2023    12:58 PM 2/21/2023     2:28 PM 12/21/2022    10:28 AM 4/23/2021    10:08 AM 3/3/2020     2:08 PM   PHQ Scores   PHQ2 Score 0 4 0 0 0 0 0   PHQ9 Score 0 11 0 0 0 0 0         Do you always wear a seat belt in the car?: Yes      Have you noted any problems with hearing?: No  Have you noted any vision problems?: No  Do you have concerns about your sexual health?: yes - does not have any  In the past month how much has pain been an issue for you?:  Not at all  In the past month have you had issues with anxiety, loneliness, irritability or fatigue:  Not at all    Do you take opioid medications even sometimes? No (  Living Will and/or Healthcare POA: Yes,   Copy requested      Healthcare Decision Maker:    Primary Decision Maker: Anabell Hewitt - Spouse - [phone]  Click here to complete Healthcare Decision Makers including selection of the Healthcare Decision Maker Relationship (ie \"Primary\").         Who lives at home with you: just wife  Do you have any pets? none  Do you have any services coming to your home (meals on wheels, home health, etc) ?: no      Do you need help with:  Using the phone:  No  Bathing: No  Dressing:  No  Toileting: No  Transportation:  No  Shopping: No  Preparing meals: No  Housework/Laundry: No  Medications: No  Money management:

## 2024-03-29 NOTE — PATIENT INSTRUCTIONS
Preventing Falls: Care Instructions  Injuries and health problems such as trouble walking or poor eyesight can increase your risk of falling. So can some medicines. But there are things you can do to help prevent falls. You can exercise to get stronger. You can also arrange your home to make it safer.    Talk to your doctor about the medicines you take. Ask if any of them increase the risk of falls and whether they can be changed or stopped.   Try to exercise regularly. It can help improve your strength and balance. This can help lower your risk of falling.     Practice fall safety and prevention.    Wear low-heeled shoes that fit well and give your feet good support. Talk to your doctor if you have foot problems that make this hard.  Carry a cellphone or wear a medical alert device that you can use to call for help.  Use stepladders instead of chairs to reach high objects. Don't climb if you're at risk for falls. Ask for help, if needed.  Wear the correct eyeglasses, if you need them.    Make your home safer.    Remove rugs, cords, clutter, and furniture from walkways.  Keep your house well lit. Use night-lights in hallways and bathrooms.  Install and use sturdy handrails on stairways.  Wear nonskid footwear, even inside. Don't walk barefoot or in socks without shoes.    Be safe outside.    Use handrails, curb cuts, and ramps whenever possible.  Keep your hands free by using a shoulder bag or backpack.  Try to walk in well-lit areas. Watch out for uneven ground, changes in pavement, and debris.  Be careful in the winter. Walk on the grass or gravel when sidewalks are slippery. Use de-icer on steps and walkways. Add non-slip devices to shoes.    Put grab bars and nonskid mats in your shower or tub and near the toilet. Try to use a shower chair or bath bench when bathing.   Get into a tub or shower by putting in your weaker leg first. Get out with your strong side first. Have a phone or medical alert device in  6/21/22  Yes Miguel Sagastume MD   D3-red wine-resveratrol-malt  04/01/2022    HGB 14.2 04/01/2022    HCT 42.1 04/01/2022    MCV 92 04/01/2022     04/01/

## 2024-05-21 NOTE — TELEPHONE ENCOUNTER
Received refill request for Telmisartan-HCTZ from [address]     Last ov: 11/21/2022 DKW    Last Refill: 12/01/2021 #90 w/ 3 refills    Next appointment: 11/21/2023 REECE PAIN SCALE 4 OF 10.

## 2024-06-26 ENCOUNTER — OFFICE VISIT (OUTPATIENT)
Dept: CARDIOLOGY CLINIC | Age: 89
End: 2024-06-26
Payer: MEDICARE

## 2024-06-26 VITALS
OXYGEN SATURATION: 99 % | HEART RATE: 91 BPM | HEIGHT: 64 IN | SYSTOLIC BLOOD PRESSURE: 142 MMHG | BODY MASS INDEX: 31.58 KG/M2 | WEIGHT: 185 LBS | DIASTOLIC BLOOD PRESSURE: 89 MMHG

## 2024-06-26 DIAGNOSIS — I10 ESSENTIAL HYPERTENSION: ICD-10-CM

## 2024-06-26 DIAGNOSIS — I10 BENIGN ESSENTIAL HYPERTENSION: ICD-10-CM

## 2024-06-26 DIAGNOSIS — R42 DIZZINESS: ICD-10-CM

## 2024-06-26 DIAGNOSIS — I47.19 PAROXYSMAL ATRIAL TACHYCARDIA (HCC): ICD-10-CM

## 2024-06-26 DIAGNOSIS — E78.2 MIXED HYPERLIPIDEMIA: ICD-10-CM

## 2024-06-26 DIAGNOSIS — I50.32 CHRONIC HEART FAILURE WITH PRESERVED EJECTION FRACTION (HCC): Primary | ICD-10-CM

## 2024-06-26 PROCEDURE — 1123F ACP DISCUSS/DSCN MKR DOCD: CPT | Performed by: INTERNAL MEDICINE

## 2024-06-26 PROCEDURE — 99214 OFFICE O/P EST MOD 30 MIN: CPT | Performed by: INTERNAL MEDICINE

## 2024-06-26 RX ORDER — DILTIAZEM HYDROCHLORIDE 60 MG/1
60 CAPSULE, EXTENDED RELEASE ORAL 2 TIMES DAILY
Qty: 180 CAPSULE | Refills: 3 | Status: SHIPPED | OUTPATIENT
Start: 2024-06-26

## 2024-06-26 RX ORDER — FUROSEMIDE 20 MG/1
20 TABLET ORAL SEE ADMIN INSTRUCTIONS
Qty: 90 TABLET | Refills: 3 | Status: SHIPPED | OUTPATIENT
Start: 2024-06-26

## 2024-06-26 RX ORDER — POTASSIUM CHLORIDE 750 MG/1
10 TABLET, EXTENDED RELEASE ORAL DAILY
Qty: 90 TABLET | Refills: 0 | Status: SHIPPED | OUTPATIENT
Start: 2024-06-26

## 2024-06-26 RX ORDER — DIMENHYDRINATE 50 MG
50 TABLET ORAL 3 TIMES DAILY
COMMUNITY

## 2024-06-26 RX ORDER — PRAVASTATIN SODIUM 40 MG
40 TABLET ORAL EVERY OTHER DAY
Qty: 90 TABLET | Refills: 3 | Status: SHIPPED | OUTPATIENT
Start: 2024-06-26

## 2024-06-26 RX ORDER — TRAMADOL HYDROCHLORIDE 50 MG/1
50 TABLET ORAL EVERY 6 HOURS PRN
COMMUNITY

## 2024-06-26 RX ORDER — DILTIAZEM HYDROCHLORIDE 60 MG/1
60 CAPSULE, EXTENDED RELEASE ORAL DAILY
Qty: 90 CAPSULE | Refills: 3 | Status: SHIPPED | OUTPATIENT
Start: 2024-06-26 | End: 2024-06-26

## 2024-06-26 ASSESSMENT — ENCOUNTER SYMPTOMS
COUGH: 0
ABDOMINAL DISTENTION: 0
CHEST TIGHTNESS: 0
ABDOMINAL PAIN: 0
PHOTOPHOBIA: 0
NAUSEA: 0
BLOOD IN STOOL: 0
SHORTNESS OF BREATH: 1

## 2024-06-26 NOTE — PROGRESS NOTES
[facility]  6/26/24  Referring: Dr. Sams    REASON FOR CONSULT/CHIEF COMPLAINT/HPI     Reason for visit/ Chief complaint  Follow up   Atrial tachycardia, risk factor modification   HPI Robin Hewitt is a 89 y.o. male with a history of atrial tachycardia, hypertension, hyperlipidemia. Follows with Dr Paul for diaphragm paralysis, copd. He is very [place].    In the interval since his last visit he was evaluated and treated for vertigo.  He gets yearly check with ayan    Today, Mr. Hewitt is here with his wife.  He has lost 40 pounds over the past year. Wife reports he is eating much less.  He has been put on oxygen ( not wearing it today). Wife expresses concern that his memory is worsening, and he is more difficult to manage at home due to increase in irritability. He has fallen but denies hitting his head.    He said he did not feel well Sunday in [employer].  He was dizzy and nauseated/ He quit taking his diltiazem because he thought this was making him dizzy ( blood pressure 118-120). Since he has been checking his blood pressure and it has been about 130-140/70.   The room spins when he lies down in bed, rolls over in bed Has had a recent eye exam. He has no chest pain, change in exertional shortness of breath, no palpitations or dizziness.         Patient is compliant with medications and is tolerating them well without side effects.     HISTORY/ALLERGIES/ROS     MedHx:  has a past medical history of Afib (HCC), Anxiety, Atrial fibrillation (HCC), Benign essential hypertension, Cervical back pain with evidence of disc disease, Cramps, extremity, Diaphragm paralysis, Elevated prostate specific antigen (PSA), Emphysema, Hypercholesteremia, and Kidney stones.  SurgHx:  has a past surgical history that includes Throat surgery; eye surgery; hernia repair; Colonoscopy; Appendectomy; Vasectomy; Knee arthroscopy (Bilateral); TURP (2007); Cochlear implant (2017); Carpal tunnel release (Bilateral); and

## 2024-06-26 NOTE — PATIENT INSTRUCTIONS
Decrease lasix to twice a week  Vestibular rehab  Follow up with pcp regarding balance  Ct of head    Follow up with pcp

## 2024-07-03 ENCOUNTER — HOSPITAL ENCOUNTER (OUTPATIENT)
Dept: CT IMAGING | Age: 89
Discharge: HOME | End: 2024-07-03
Attending: INTERNAL MEDICINE
Payer: MEDICARE

## 2024-07-03 DIAGNOSIS — R42 DIZZINESS: ICD-10-CM

## 2024-07-03 PROCEDURE — 70450 CT HEAD/BRAIN W/O DYE: CPT

## 2024-07-11 ENCOUNTER — TELEPHONE (OUTPATIENT)
Dept: CARDIOLOGY CLINIC | Age: 89
End: 2024-07-11

## 2024-07-11 NOTE — TELEPHONE ENCOUNTER
Head CT showed from 7/3/24:  IMPRESSION:  No acute intracranial abnormality.    Tried calling, got VM, did not leave a message.

## 2024-07-12 NOTE — TELEPHONE ENCOUNTER
Spoke with patient, states he feels off balance/dizzy. If he ambulates with his walker he has no issues. Advised patient to follow up with primary care MD regarding symptoms and treatment plan. Offered to transfer patient to physical therapy scheduling department to schedule for vestibular therapy as ordered by DKW last visit. He would like to review this with his PCP prior to scheduling. Pt encouraged to call back with any concerns/changes in symptoms. Verbalized understanding.

## 2024-07-12 NOTE — TELEPHONE ENCOUNTER
I spoke to pt and relayed message per DKW. Pt verbalized understanding. He stated he is still dizzy and and he is thinking what causes the dizziness. Please advise.

## 2024-07-17 NOTE — PROGRESS NOTES
tablet; Take 1 tablet by mouth 3 times daily as needed for Dizziness  Dispense: 15 tablet; Refill: 0    3. Chronic heart failure with preserved ejection fraction (HCC)  4. Essential hypertension  5. COPD, mild (HCC)  - Ambulatory Referral to Care Management      *I have spent 30 minutes reviewing previous notes, test results and face to face with the patient discussing the diagnosis and importance of compliance with the treatment plan as well as documenting on the day of the visit.       RTO: Return for chronic condition f/u.      If the patient has a future appointment, it is displayed below:  Future Appointments   Date Time Provider Department Center   12/3/2024  1:15 PM Asim Sutherland DO [facility] Cardio [facility]         Electronically signed by Eleonora Wilburn DO on 7/18/2024 at 8:21 AM

## 2024-07-18 ENCOUNTER — OFFICE VISIT (OUTPATIENT)
Dept: FAMILY MEDICINE CLINIC | Age: 89
End: 2024-07-18

## 2024-07-18 VITALS
TEMPERATURE: 98.2 F | DIASTOLIC BLOOD PRESSURE: 68 MMHG | BODY MASS INDEX: 32.48 KG/M2 | OXYGEN SATURATION: 98 % | SYSTOLIC BLOOD PRESSURE: 112 MMHG | HEART RATE: 75 BPM | WEIGHT: 189.2 LBS

## 2024-07-18 DIAGNOSIS — I50.32 CHRONIC HEART FAILURE WITH PRESERVED EJECTION FRACTION (HCC): ICD-10-CM

## 2024-07-18 DIAGNOSIS — I10 ESSENTIAL HYPERTENSION: ICD-10-CM

## 2024-07-18 DIAGNOSIS — J44.9 COPD, MILD (HCC): ICD-10-CM

## 2024-07-18 DIAGNOSIS — R42 VERTIGO: Primary | ICD-10-CM

## 2024-07-18 DIAGNOSIS — R53.81 PHYSICAL DECONDITIONING: ICD-10-CM

## 2024-07-18 RX ORDER — LORATADINE 10 MG/1
10 TABLET ORAL DAILY
Qty: 30 TABLET | Refills: 0 | Status: SHIPPED | OUTPATIENT
Start: 2024-07-18

## 2024-07-18 RX ORDER — MECLIZINE HYDROCHLORIDE 25 MG/1
25 TABLET ORAL 3 TIMES DAILY PRN
Qty: 15 TABLET | Refills: 0 | Status: SHIPPED | OUTPATIENT
Start: 2024-07-18 | End: 2024-07-28

## 2024-07-19 ENCOUNTER — CARE COORDINATION (OUTPATIENT)
Dept: CARE COORDINATION | Age: 89
End: 2024-07-19

## 2024-07-19 NOTE — CARE COORDINATION
Ambulatory Care Coordination Note     2024 9:30 AM     Patient Current Location:  Home: [address] [place] [address] [place] [facility] [address]     This patient was received as a referral from Provider.    ACM contacted the patient by telephone. Verified name and  with patient as identifiers. Provided introduction to self, and explanation of the ACM role.   Patient declined care management services at this time.          ACM: Joyce Winn RN       Method of communication with provider: chart routing.    Care Summary Note: ACM made outreach to patient for care management and RPM enrollment from PCP. Patient has declined. Patient was encouraged to keep ACM contact information and call with any future care management needs. Patient thanked ACM and agreed to call with any CM needs.     Offered patient enrollment in the Remote Patient Monitoring (RPM) program for in-home monitoring: Yes, but did not enroll at this time: declined to enroll in the program because- per patient \"I don't need that, I am fine\" .       PCP/Specialist follow up:   Future Appointments         Provider Specialty Dept Phone    12/3/2024 1:15 PM Asim Sutherland, DO Cardiology [phone]        Patient advised ACM that he has PT number and will call and schedule next week. He will call with any barriers to scheduling.     Follow Up:   No further Ambulatory Care Management follow-up scheduled at this time.  Patient  has Ambulatory Care Manager's contact information for any further questions, concerns or needs.

## 2024-08-13 ENCOUNTER — OFFICE VISIT (OUTPATIENT)
Dept: ENT CLINIC | Age: 89
End: 2024-08-13
Payer: MEDICARE

## 2024-08-13 VITALS
WEIGHT: 191 LBS | SYSTOLIC BLOOD PRESSURE: 123 MMHG | DIASTOLIC BLOOD PRESSURE: 68 MMHG | TEMPERATURE: 98.2 F | OXYGEN SATURATION: 97 % | HEIGHT: 64 IN | BODY MASS INDEX: 32.61 KG/M2 | HEART RATE: 73 BPM

## 2024-08-13 DIAGNOSIS — Z96.21 HISTORY OF COCHLEAR IMPLANT: ICD-10-CM

## 2024-08-13 DIAGNOSIS — R42 DIZZINESS: Primary | ICD-10-CM

## 2024-08-13 PROCEDURE — 99203 OFFICE O/P NEW LOW 30 MIN: CPT | Performed by: STUDENT IN AN ORGANIZED HEALTH CARE EDUCATION/TRAINING PROGRAM

## 2024-08-13 PROCEDURE — 1123F ACP DISCUSS/DSCN MKR DOCD: CPT | Performed by: STUDENT IN AN ORGANIZED HEALTH CARE EDUCATION/TRAINING PROGRAM

## 2024-08-13 NOTE — PROGRESS NOTES
[facility]  DIVISION OF OTOLARYNGOLOGY- HEAD & NECK SURGERY  NEW PATIENT HISTORY AND PHYSICAL NOTE      Patient Name: Robin Hewitt  Medical Record Number:  [id number]  Primary Care Physician:  Eleonora Wilburn DO    ChiefComplaint     Chief Complaint   Patient presents with    Dizziness     Dizziness,        History of Present Illness     Robin Hewitt is an 89 y.o. male presenting with imbalance.  On May 20 he had 30 minutes of vertigo.  This was an isolated episode.  Has not had vertigo since.  But since then he has been losing his balance and falling more frequently.  He did fall and land on his left shoulder.  Ever since his vertigo and balance issues started he has been ambulating with a walker.  Whenever he ambulates with a walker he feels much more stable.  He was given Dramamine but that did not seem to help.  He did follow-up with cardiology because he felt like whenever his blood pressure would drop his neck would feel very heavy.  History of prior left cochlear implant placement approximately 5 to 6 years ago.    His PCP has already placed a referral to physical therapy for vestibular rehab but he wanted to see ENT before committing to this.    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

## 2024-08-16 ENCOUNTER — HOSPITAL ENCOUNTER (OUTPATIENT)
Dept: PHYSICAL THERAPY | Age: 89
Setting detail: THERAPIES SERIES
Discharge: HOME | End: 2024-08-16
Attending: STUDENT IN AN ORGANIZED HEALTH CARE EDUCATION/TRAINING PROGRAM
Payer: MEDICARE

## 2024-08-16 DIAGNOSIS — R26.89 BALANCE DISORDER: ICD-10-CM

## 2024-08-16 DIAGNOSIS — R53.1 GENERALIZED WEAKNESS: Primary | ICD-10-CM

## 2024-08-16 DIAGNOSIS — H81.90 VESTIBULAR DYSFUNCTION, UNSPECIFIED LATERALITY: ICD-10-CM

## 2024-08-16 DIAGNOSIS — Z91.81 AT RISK FOR FALLS: ICD-10-CM

## 2024-08-16 PROCEDURE — 97162 PT EVAL MOD COMPLEX 30 MIN: CPT

## 2024-08-16 PROCEDURE — 97530 THERAPEUTIC ACTIVITIES: CPT

## 2024-08-16 NOTE — PLAN OF CARE
Balance    Start on compliant surfaces, and progress           Stair Toe Taps               Therapeutic Activity (05445)       Gait Training, start in // bars, progress to no A.D.                                           Manual Interventions (46760)              Cervical PROM                         Modalities:    No modalities applied this session    Education/Home Exercise Program: Not enough time for HEP instruction this visit.  Plan to address at follow up.      ASSESSMENT   Assessment:   Robin Hewitt is a 89 y.o.  male presenting today to Outpatient PT with signs and symptoms consistent with gait deficits, impaired balance, decreased LE stability, and vestibular hypofunction.    Pt. presents with the functional impairments and activity limitations listed below and would benefit from Outpatient PT to address the below impairments to return to previous level of function.     Functional Impairments:  Problem List/Functional Limitations:    Gait Instability  Decreased tolerance to ADLs  Decreased functional strength   Reduced Balance/ Proprioceptive Control  Decreased Cervical ROM    Functional Activity Limitations:  Reduced ability to forward bend  Reduced ability to turn/pitch head rapidly   Reduced ability to self correct for loss of balance       Participation Restrictions  Reduced Participation in Self Care activities  Reduced Participation in home management activities  Reduced Participation in social activities     Classification:  Signs and Symptoms consistent with :   Unilateral peripheral vestibulopathy (ie: vestibular neuritis, labyrinthitis, acoustic neuroma  gait instability  neck pain with mobility deficits    Barriers to/and or personal factors that will affect rehab potential:   Age  Co-morbidities    Tolerance of evaluation/treatment:   Fair    Physical Therapy Evaluation Complexity Justification  [x] A history of present problem and 1-2 personal factors and/or co-morbidities that

## 2024-08-21 ENCOUNTER — HOSPITAL ENCOUNTER (OUTPATIENT)
Dept: PHYSICAL THERAPY | Age: 89
Setting detail: THERAPIES SERIES
Discharge: HOME | End: 2024-08-21
Attending: STUDENT IN AN ORGANIZED HEALTH CARE EDUCATION/TRAINING PROGRAM
Payer: MEDICARE

## 2024-08-21 ENCOUNTER — HOSPITAL ENCOUNTER (OUTPATIENT)
Dept: PHYSICAL THERAPY | Age: 89
Setting detail: THERAPIES SERIES
End: 2024-08-21
Attending: STUDENT IN AN ORGANIZED HEALTH CARE EDUCATION/TRAINING PROGRAM
Payer: MEDICARE

## 2024-08-21 PROCEDURE — 97112 NEUROMUSCULAR REEDUCATION: CPT

## 2024-08-21 PROCEDURE — 97140 MANUAL THERAPY 1/> REGIONS: CPT

## 2024-08-21 PROCEDURE — 97530 THERAPEUTIC ACTIVITIES: CPT

## 2024-08-21 NOTE — FLOWSHEET NOTE
Plan to address at follow up.      ASSESSMENT         Today's Assessment:  The patient began balance training today . He presented with moderate to severe imbalance when challenged on compliant surface( airex) pad. Provided with extensive education about peripheral vestibular pathologies. He demonstrated increase tension at upper and mid cervical region. Patient likely recovering from neuritis and cervicogenic dysfunction. Will integrate VRT to cover both habituation and manual to assist with central compensation.     Medical Necessity Documentation:  I certify that this patient meets the below criteria necessary for medical necessity for care and/or justification of therapy services:  The patient has functional impairments and/or activity limitations and would benefit from continued outpatient therapy services to address the deficits outlined in the patients goals  The patient has a musculoskeletal condition(s) with a corresponding ICD-10 code that is of complexity and severity that require skilled therapeutic intervention. This has a direct and significant impact on the need for therapy and significantly impacts the rate of recovery.   The patient has a complexity identified by an ICD-10 code that has a direct and significant impact on the need for therapy.  (Significantly impacts the rate of recovery and is associated with a primary condition.)     Prognosis/Rehab Potential: Fair    Patient requires continued skilled intervention: [x] Yes  [] No      CHARGE CAPTURE     PT CHARGE GRID   CPT Code (TIMED) minutes # CPT Code (UNTIMED) #     Therex (67202)     EVAL:MODERATE (64407 - Typically 30 minutes face-to-face)     Neuromusc. Re-ed (69848) 20 1  Re-Eval (68417)     Manual (30224) 12 1  Estim Unattended (86273)     Ther. Act (02465) 10 1  [facility]. Traction (05726)     Gait (65591)    Dry Needle 1-2 muscle (20560)     Aquatic Therex (69588)    Dry Needle 3+ muscle (20561)     Iontophoresis (42754)    VASO (24503)

## 2024-08-26 ENCOUNTER — HOSPITAL ENCOUNTER (OUTPATIENT)
Dept: PHYSICAL THERAPY | Age: 89
Setting detail: THERAPIES SERIES
Discharge: HOME | End: 2024-08-26
Attending: STUDENT IN AN ORGANIZED HEALTH CARE EDUCATION/TRAINING PROGRAM
Payer: MEDICARE

## 2024-08-26 PROCEDURE — 97140 MANUAL THERAPY 1/> REGIONS: CPT

## 2024-08-26 PROCEDURE — 97112 NEUROMUSCULAR REEDUCATION: CPT

## 2024-08-26 PROCEDURE — 97110 THERAPEUTIC EXERCISES: CPT

## 2024-08-26 NOTE — FLOWSHEET NOTE
[facility] - Outpatient Rehabilitation and Therapy [address], [place] [address] office: [phone] fax: [phone]     therapist’s plan is approved by physician       Physical Therapy: TREATMENT/PROGRESS NOTE   Patient: Robin Hewitt (89 y.o. male)   Examination Date: 2024   :  1930 MRN: [id number]   Visit #: 3   Insurance Allowable Auth Needed    []Yes    []No    Insurance: Payor: [facility] MEDICARE / Plan: [facility] MEDICARE ADVANTAGE / Product Type: *No Product type* /   Insurance ID: [id number] - (Medicare Managed)  Secondary Insurance (if applicable):    Treatment Diagnosis:     ICD-10-CM    1. Generalized weakness  R53.1       2. At risk for falls  Z91.81       3. Balance disorder  R26.89       4. Vestibular dysfunction, unspecified laterality  H81.90          Medical Diagnosis:  Vertigo [R42]  Physical deconditioning [R53.81]   Referring Physician: Eleonora Wilburn DO  PCP: Eleonora Wilburn DO       Plan of care signed (Y/N):     Date of Patient follow up with Physician:      Plan of Care Report: NO  POC update due: (10 visits /OR AUTH LIMITS, whichever is less)  9/15/2024                                             Medical History:  Comorbidities:  Osteoarthritis  Relevant Medical History: History of cochlear implant, OA involving multiple joints, Stage 3 CKD, Chronic heart failure, HTN, Atrial Tachycardia, Anxiety, Cervical pain, Knee scope, carpal tunnel release                                         Precautions/ Contra-indications:           Latex allergy:  NO  Pacemaker:    NO  Contraindications for Manipulation: None  Date of Surgery:   Other:    Red Flags:  None    Suicide Screening:   The patient did not verbalize a primary behavioral concern, suicidal ideation, suicidal intent, or demonstrate suicidal behaviors.    Preferred Language for Healthcare:   [x] English       [] other:    SUBJECTIVE EXAMINATION     Patient stated complaint/comment:  Couldn't tell  needed that may include: Traction  Patient education on progression of HEP and vestibular fuction/BPPV  CRP for assessment, treatment and education of BPPV    Plan:  Continue with VRT and manual     Electronically Signed by Linus Thompson PT  Date: 08/26/2024     Note: Portions of this note have been templated and/or copied from initial evaluation, reassessments and prior notes for documentation efficiency.    Note: If patient does not return for scheduled/recommended follow up visits, this note will serve as a discharge from care along with the most recent update on progress.    Vestibular Evaluation

## 2024-08-29 ENCOUNTER — HOSPITAL ENCOUNTER (OUTPATIENT)
Dept: PHYSICAL THERAPY | Age: 89
Setting detail: THERAPIES SERIES
Discharge: HOME | End: 2024-08-29
Attending: STUDENT IN AN ORGANIZED HEALTH CARE EDUCATION/TRAINING PROGRAM
Payer: MEDICARE

## 2024-08-29 PROCEDURE — 97140 MANUAL THERAPY 1/> REGIONS: CPT

## 2024-08-29 PROCEDURE — 97110 THERAPEUTIC EXERCISES: CPT

## 2024-08-29 PROCEDURE — 97112 NEUROMUSCULAR REEDUCATION: CPT

## 2024-08-29 NOTE — FLOWSHEET NOTE
[facility] - Outpatient Rehabilitation and Therapy [address], [place] [address] office: [phone] fax: [phone]     therapist’s plan is approved by physician       Physical Therapy: TREATMENT/PROGRESS NOTE   Patient: Robin Hewitt (89 y.o. male)   Examination Date: 2024   :  1930 MRN: [id number]   Visit #: 4   Insurance Allowable Auth Needed    []Yes    []No    Insurance: Payor: [facility] MEDICARE / Plan: [facility] MEDICARE ADVANTAGE / Product Type: *No Product type* /   Insurance ID: [id number] - (Medicare Managed)  Secondary Insurance (if applicable):    Treatment Diagnosis:     ICD-10-CM    1. Generalized weakness  R53.1       2. At risk for falls  Z91.81       3. Balance disorder  R26.89       4. Vestibular dysfunction, unspecified laterality  H81.90          Medical Diagnosis:  Vertigo [R42]  Physical deconditioning [R53.81]   Referring Physician: Eleonora Wilburn DO  PCP: Eleonora Wilburn DO       Plan of care signed (Y/N):     Date of Patient follow up with Physician:      Plan of Care Report: NO  POC update due: (10 visits /OR AUTH LIMITS, whichever is less)  9/15/2024                                             Medical History:  Comorbidities:  Osteoarthritis  Relevant Medical History: History of cochlear implant, OA involving multiple joints, Stage 3 CKD, Chronic heart failure, HTN, Atrial Tachycardia, Anxiety, Cervical pain, Knee scope, carpal tunnel release                                         Precautions/ Contra-indications:           Latex allergy:  NO  Pacemaker:    NO  Contraindications for Manipulation: None  Date of Surgery:   Other:    Red Flags:  None    Suicide Screening:   The patient did not verbalize a primary behavioral concern, suicidal ideation, suicidal intent, or demonstrate suicidal behaviors.    Preferred Language for Healthcare:   [x] English       [] other:    SUBJECTIVE EXAMINATION     Patient stated complaint/comment:  Sore in neck  Progression has been slowed due to co-morbidities.  [x] Plan just implemented, too soon (<30days) to assess goals progression   [] Goals require adjustment due to lack of progress  [] Patient is not progressing as expected and requires additional follow up with physician  [] Other:     TREATMENT PLAN     Frequency/Duration: 2x/week for 4-6 weeks for the following treatment interventions:    Interventions:  Therapeutic Exercise (76975) including: strength training, ROM, and functional mobility  Therapeutic Activities (34967) including: functional mobility training and education.  Neuromuscular Re-education (10744) activation and proprioception, including postural re-education.    Gait Training (67053) for normalization of ambulation patterns and AD training.   Manual Therapy (77065) as indicated to include: Passive Range of Motion  Modalities as needed that may include: Traction  Patient education on progression of HEP and vestibular fuction/BPPV  CRP for assessment, treatment and education of BPPV    Plan:  Continue with VRT and manual     Electronically Signed by Linus Thompson, PT  Date: 08/29/2024     Note: Portions of this note have been templated and/or copied from initial evaluation, reassessments and prior notes for documentation efficiency.    Note: If patient does not return for scheduled/recommended follow up visits, this note will serve as a discharge from care along with the most recent update on progress.    Vestibular Evaluation

## 2024-09-03 ENCOUNTER — APPOINTMENT (OUTPATIENT)
Dept: PHYSICAL THERAPY | Age: 89
End: 2024-09-03
Attending: STUDENT IN AN ORGANIZED HEALTH CARE EDUCATION/TRAINING PROGRAM
Payer: MEDICARE

## 2024-09-04 DIAGNOSIS — R42 VERTIGO: ICD-10-CM

## 2024-09-04 DIAGNOSIS — J44.9 COPD, MILD (HCC): Primary | ICD-10-CM

## 2024-09-04 DIAGNOSIS — M54.50 CHRONIC LOW BACK PAIN WITHOUT SCIATICA, UNSPECIFIED BACK PAIN LATERALITY: Primary | ICD-10-CM

## 2024-09-04 DIAGNOSIS — J96.11 CHRONIC HYPOXEMIC RESPIRATORY FAILURE (HCC): ICD-10-CM

## 2024-09-04 DIAGNOSIS — G89.29 CHRONIC LOW BACK PAIN WITHOUT SCIATICA, UNSPECIFIED BACK PAIN LATERALITY: Primary | ICD-10-CM

## 2024-09-04 RX ORDER — TELMISARTAN AND HYDROCHLORTHIAZIDE 80; 25 MG/1; MG/1
1 TABLET ORAL DAILY
Qty: 90 TABLET | Refills: 3 | Status: SHIPPED | OUTPATIENT
Start: 2024-09-04

## 2024-09-04 RX ORDER — TRAMADOL HYDROCHLORIDE 50 MG/1
50 TABLET ORAL EVERY 6 HOURS PRN
Qty: 90 TABLET | Refills: 0 | Status: SHIPPED | OUTPATIENT
Start: 2024-09-04 | End: 2024-10-04

## 2024-09-04 RX ORDER — IPRATROPIUM BROMIDE AND ALBUTEROL SULFATE 2.5; .5 MG/3ML; MG/3ML
1 SOLUTION RESPIRATORY (INHALATION) EVERY 6 HOURS PRN
Qty: 360 ML | Refills: 1 | Status: SHIPPED | OUTPATIENT
Start: 2024-09-04 | End: 2024-09-05

## 2024-09-04 RX ORDER — PRAVASTATIN SODIUM 40 MG
40 TABLET ORAL EVERY OTHER DAY
Qty: 45 TABLET | Refills: 3 | Status: SHIPPED | OUTPATIENT
Start: 2024-09-04

## 2024-09-04 RX ORDER — POTASSIUM CHLORIDE 750 MG/1
10 TABLET, EXTENDED RELEASE ORAL DAILY
Qty: 90 TABLET | Refills: 0 | Status: SHIPPED | OUTPATIENT
Start: 2024-09-04

## 2024-09-04 RX ORDER — LORATADINE 10 MG/1
10 TABLET ORAL DAILY
Qty: 30 TABLET | Refills: 0 | Status: SHIPPED | OUTPATIENT
Start: 2024-09-04

## 2024-09-04 NOTE — TELEPHONE ENCOUNTER
Medication:   Requested Prescriptions      No prescriptions requested or ordered in this encounter      Last Filled:  3/8/24    Patient Phone Number: [phone] (home)     Last appt: 7/18/2024   Next appt: Visit date not found    Last OARRS:       12/6/2023    10:28 AM   RX Monitoring   Periodic Controlled Substance Monitoring Possible medication side effects, risk of tolerance/dependence & alternative treatments discussed.     PDMP Monitoring:    Last PDMP Bill as Reviewed ([place]):  Review User Review Instant Review Result   PAIGE KANG 12/6/2023 12:25 PM Reviewed PDMP [1]     Preferred Pharmacy:   [id number] [place], [place] - [address] - P [phone] - F [phone]  [address] [facility] [address]  Phone: [phone] Fax: [phone]    [facility] - [place], [place] - [address] - P [phone] - F [phone]  [address] [facility] [address]  Phone: [phone] Fax: [phone]

## 2024-09-05 RX ORDER — IPRATROPIUM BROMIDE AND ALBUTEROL SULFATE 2.5; .5 MG/3ML; MG/3ML
SOLUTION RESPIRATORY (INHALATION)
Qty: 1080 ML | Refills: 0 | Status: SHIPPED | OUTPATIENT
Start: 2024-09-05

## 2024-09-05 NOTE — TELEPHONE ENCOUNTER
Needs renal panel   
Requested Prescriptions     Pending Prescriptions Disp Refills    potassium chloride (KLOR-CON M) 10 MEQ extended release tablet [Pharmacy Med Name: POTASSIUM CL MICRO 10MEQ ER TABS] 90 tablet 0     Sig: TAKE 1 TABLET BY MOUTH DAILY    pravastatin (PRAVACHOL) 40 MG tablet [Pharmacy Med Name: PRAVASTATIN 40MG TABLETS] 45 tablet      Sig: TAKE 1 TABLET BY MOUTH EVERY OTHER DAY    telmisartan-hydroCHLOROthiazide (MICARDIS HCT) 80-25 MG per tablet [Pharmacy Med Name: TELMISARTAN/HCTZ 80-25MG TABS] 90 tablet 3     Sig: TAKE 1 TABLET BY MOUTH EVERY DAY      : [id number] [place] [address] - P [phone] - F [phone]         Lv-06/26/24  Nv-12/3/24  LR-6/26/24  
done

## 2024-09-06 ENCOUNTER — TELEPHONE (OUTPATIENT)
Dept: CARDIOLOGY CLINIC | Age: 89
End: 2024-09-06

## 2024-09-06 ENCOUNTER — HOSPITAL ENCOUNTER (OUTPATIENT)
Dept: PHYSICAL THERAPY | Age: 89
Setting detail: THERAPIES SERIES
Discharge: HOME | End: 2024-09-06
Attending: STUDENT IN AN ORGANIZED HEALTH CARE EDUCATION/TRAINING PROGRAM
Payer: MEDICARE

## 2024-09-06 DIAGNOSIS — I50.32 CHRONIC HEART FAILURE WITH PRESERVED EJECTION FRACTION (HCC): Primary | ICD-10-CM

## 2024-09-06 PROCEDURE — 97112 NEUROMUSCULAR REEDUCATION: CPT

## 2024-09-06 PROCEDURE — 97110 THERAPEUTIC EXERCISES: CPT

## 2024-09-06 NOTE — FLOWSHEET NOTE
[facility] - Outpatient Rehabilitation and Therapy [address], [place] [address] office: [phone] fax: [phone]     therapist’s plan is approved by physician       Physical Therapy: TREATMENT/PROGRESS NOTE   Patient: Robin Hewitt (89 y.o. male)   Examination Date: 2024   :  1930 MRN: [id number]   Visit #: 5   Insurance Allowable Auth Needed    []Yes    []No    Insurance: Payor: [facility] MEDICARE / Plan: [facility] MEDICARE ADVANTAGE / Product Type: *No Product type* /   Insurance ID: [id number] - (Medicare Managed)  Secondary Insurance (if applicable):    Treatment Diagnosis:     ICD-10-CM    1. Generalized weakness  R53.1       2. At risk for falls  Z91.81       3. Balance disorder  R26.89       4. Vestibular dysfunction, unspecified laterality  H81.90          Medical Diagnosis:  Vertigo [R42]  Physical deconditioning [R53.81]   Referring Physician: Eleonora Wilburn DO  PCP: Eleonora Wilburn DO       Plan of care signed (Y/N):     Date of Patient follow up with Physician:      Plan of Care Report: NO  POC update due: (10 visits /OR AUTH LIMITS, whichever is less)  9/15/2024                                             Medical History:  Comorbidities:  Osteoarthritis  Relevant Medical History: History of cochlear implant, OA involving multiple joints, Stage 3 CKD, Chronic heart failure, HTN, Atrial Tachycardia, Anxiety, Cervical pain, Knee scope, carpal tunnel release                                         Precautions/ Contra-indications:           Latex allergy:  NO  Pacemaker:    NO  Contraindications for Manipulation: None  Date of Surgery:   Other:    Red Flags:  None    Suicide Screening:   The patient did not verbalize a primary behavioral concern, suicidal ideation, suicidal intent, or demonstrate suicidal behaviors.    Preferred Language for Healthcare:   [x] English       [] other:    SUBJECTIVE EXAMINATION     Patient stated complaint/comment:  Sore in neck

## 2024-09-09 ENCOUNTER — HOSPITAL ENCOUNTER (OUTPATIENT)
Dept: PHYSICAL THERAPY | Age: 89
Setting detail: THERAPIES SERIES
Discharge: HOME | End: 2024-09-09
Attending: STUDENT IN AN ORGANIZED HEALTH CARE EDUCATION/TRAINING PROGRAM
Payer: MEDICARE

## 2024-09-09 PROCEDURE — 97112 NEUROMUSCULAR REEDUCATION: CPT

## 2024-09-09 PROCEDURE — 97110 THERAPEUTIC EXERCISES: CPT

## 2024-09-19 ENCOUNTER — OFFICE VISIT (OUTPATIENT)
Dept: ENT CLINIC | Age: 89
End: 2024-09-19
Payer: MEDICARE

## 2024-09-19 ENCOUNTER — APPOINTMENT (OUTPATIENT)
Dept: PHYSICAL THERAPY | Age: 89
End: 2024-09-19
Attending: STUDENT IN AN ORGANIZED HEALTH CARE EDUCATION/TRAINING PROGRAM
Payer: MEDICARE

## 2024-09-19 VITALS
SYSTOLIC BLOOD PRESSURE: 145 MMHG | HEIGHT: 64 IN | OXYGEN SATURATION: 97 % | WEIGHT: 188 LBS | TEMPERATURE: 97.5 F | HEART RATE: 71 BPM | BODY MASS INDEX: 32.1 KG/M2 | DIASTOLIC BLOOD PRESSURE: 83 MMHG

## 2024-09-19 DIAGNOSIS — Z96.21 HISTORY OF COCHLEAR IMPLANT: ICD-10-CM

## 2024-09-19 DIAGNOSIS — R26.89 IMBALANCE: Primary | ICD-10-CM

## 2024-09-19 PROCEDURE — 1123F ACP DISCUSS/DSCN MKR DOCD: CPT | Performed by: STUDENT IN AN ORGANIZED HEALTH CARE EDUCATION/TRAINING PROGRAM

## 2024-09-19 PROCEDURE — 99213 OFFICE O/P EST LOW 20 MIN: CPT | Performed by: STUDENT IN AN ORGANIZED HEALTH CARE EDUCATION/TRAINING PROGRAM

## 2024-09-24 ENCOUNTER — APPOINTMENT (OUTPATIENT)
Dept: PHYSICAL THERAPY | Age: 89
End: 2024-09-24
Attending: STUDENT IN AN ORGANIZED HEALTH CARE EDUCATION/TRAINING PROGRAM
Payer: MEDICARE

## 2024-09-26 ENCOUNTER — APPOINTMENT (OUTPATIENT)
Dept: PHYSICAL THERAPY | Age: 89
End: 2024-09-26
Attending: STUDENT IN AN ORGANIZED HEALTH CARE EDUCATION/TRAINING PROGRAM
Payer: MEDICARE

## 2024-10-08 ENCOUNTER — TELEPHONE (OUTPATIENT)
Dept: CARDIOLOGY CLINIC | Age: 89
End: 2024-10-08

## 2024-10-11 ENCOUNTER — TELEPHONE (OUTPATIENT)
Dept: FAMILY MEDICINE CLINIC | Age: 89
End: 2024-10-11

## 2024-10-11 DIAGNOSIS — N18.31 STAGE 3A CHRONIC KIDNEY DISEASE (HCC): Primary | ICD-10-CM

## 2024-10-11 DIAGNOSIS — I50.32 CHRONIC HEART FAILURE WITH PRESERVED EJECTION FRACTION (HCC): ICD-10-CM

## 2024-10-11 LAB
ALBUMIN SERPL-MCNC: 4.1 G/DL (ref 3.4–5)
ANION GAP SERPL CALCULATED.3IONS-SCNC: 11 MMOL/L (ref 3–16)
BUN SERPL-MCNC: 29 MG/DL (ref 7–20)
CALCIUM SERPL-MCNC: 9.8 MG/DL (ref 8.3–10.6)
CHLORIDE SERPL-SCNC: 103 MMOL/L (ref 99–110)
CO2 SERPL-SCNC: 29 MMOL/L (ref 21–32)
CREAT SERPL-MCNC: 1.1 MG/DL (ref 0.8–1.3)
GFR SERPLBLD CREATININE-BSD FMLA CKD-EPI: 62 ML/MIN/{1.73_M2}
GLUCOSE SERPL-MCNC: 90 MG/DL (ref 70–99)
PHOSPHATE SERPL-MCNC: 2.9 MG/DL (ref 2.5–4.9)
POTASSIUM SERPL-SCNC: 4.2 MMOL/L (ref 3.5–5.1)
SODIUM SERPL-SCNC: 143 MMOL/L (ref 136–145)

## 2024-10-11 NOTE — TELEPHONE ENCOUNTER
Per patient Dr. Asim Quevedo, Cardiologist is wanting him to have a Renal Panel completed. Please let him know when the order is ready so he can go get the blood drawn. His call back number is [phone].     Please advise.

## 2024-10-16 ENCOUNTER — OFFICE VISIT (OUTPATIENT)
Dept: ENT CLINIC | Age: 89
End: 2024-10-16
Payer: MEDICARE

## 2024-10-16 VITALS
WEIGHT: 193 LBS | SYSTOLIC BLOOD PRESSURE: 150 MMHG | HEART RATE: 75 BPM | HEIGHT: 64 IN | BODY MASS INDEX: 32.95 KG/M2 | OXYGEN SATURATION: 98 % | DIASTOLIC BLOOD PRESSURE: 77 MMHG | TEMPERATURE: 98.2 F

## 2024-10-16 DIAGNOSIS — Z96.21 HISTORY OF COCHLEAR IMPLANT: ICD-10-CM

## 2024-10-16 DIAGNOSIS — H60.331 ACUTE SWIMMER'S EAR OF RIGHT SIDE: Primary | ICD-10-CM

## 2024-10-16 PROCEDURE — 99213 OFFICE O/P EST LOW 20 MIN: CPT | Performed by: STUDENT IN AN ORGANIZED HEALTH CARE EDUCATION/TRAINING PROGRAM

## 2024-10-16 PROCEDURE — 1123F ACP DISCUSS/DSCN MKR DOCD: CPT | Performed by: STUDENT IN AN ORGANIZED HEALTH CARE EDUCATION/TRAINING PROGRAM

## 2024-10-16 RX ORDER — NEOMYCIN SULFATE, POLYMYXIN B SULFATE, HYDROCORTISONE 3.5; 10000; 1 MG/ML; [USP'U]/ML; MG/ML
4 SOLUTION/ DROPS AURICULAR (OTIC) 3 TIMES DAILY
Qty: 10 ML | Refills: 0 | Status: SHIPPED | OUTPATIENT
Start: 2024-10-16 | End: 2024-10-26

## 2024-10-16 NOTE — PROGRESS NOTES
[facility]  DIVISION OF OTOLARYNGOLOGY- HEAD & NECK SURGERY  CLINIC FOLLOW-UP VISIT      Patient Name: Robin Hewitt  Medical Record Number:  [id number]  Primary Care Physician:  Eleonora Wilburn DO    ChiefComplaint     Chief Complaint   Patient presents with    Ear Problem     Rt ear plugged up, itching, swollen        History of Present Illness     Robin Hewitt is an 89 y.o. male previously seen for imbalance, history of cochlear implant.  Last seen 9/19/2024.    Interval History:   Starting approximately 4 days ago he noted some increased hearing loss in his right ear.  He appeared a Q-tip in his ear noted white thick discharge in his right ear canal.  He then flushed his right ear out with hydroperoxide.  When lying on his right ear he will occasionally hear popping in his ear.  Denies otalgia.  Denies otorrhea.  No recent eardrop use.  He was seen by his PCP who placed him on allergy pills which have not helped.  Right ear often itches.  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    Elevated prostate specific antigen (PSA)     history of    Emphysema     Hypercholesteremia     Kidney stones        Past Surgical History     Past Surgical History:   Procedure Laterality Date    APPENDECTOMY      BRONCHOSCOPY N/A 7/10/2023    BRONCHOSCOPY FOREIGN BODY REMOVAL performed by Bigg Cordon MD at [facility] ENDOSCOPY    CARPAL TUNNEL RELEASE Bilateral     COCHLEAR IMPLANT  2017    [facility]    COLONOSCOPY      EYE SURGERY      cataract with lens implant    HERNIA REPAIR      rt side    KNEE ARTHROSCOPY Bilateral     THROAT SURGERY      uvula resected, and nasal surgery for sleep apnea    TURP  2007

## 2024-12-09 RX ORDER — TELMISARTAN AND HYDROCHLORTHIAZIDE 80; 25 MG/1; MG/1
1 TABLET ORAL DAILY
Qty: 90 TABLET | Refills: 3 | OUTPATIENT
Start: 2024-12-09

## 2024-12-12 ENCOUNTER — TELEPHONE (OUTPATIENT)
Dept: FAMILY MEDICINE CLINIC | Age: 88
End: 2024-12-12

## 2024-12-12 DIAGNOSIS — J44.9 COPD, MILD (HCC): ICD-10-CM

## 2024-12-12 DIAGNOSIS — J96.11 CHRONIC HYPOXEMIC RESPIRATORY FAILURE: ICD-10-CM

## 2024-12-12 RX ORDER — IPRATROPIUM BROMIDE AND ALBUTEROL SULFATE 2.5; .5 MG/3ML; MG/3ML
SOLUTION RESPIRATORY (INHALATION)
Qty: 3 ML | Refills: 3 | Status: SHIPPED | OUTPATIENT
Start: 2024-12-12

## 2024-12-12 NOTE — TELEPHONE ENCOUNTER
ipratropium 0.5 mg-albuterol 2.5 mg (DUONEB) 0.5-2.5 (3) MG/3ML SOLN nebulizer solution     Calling to verify quantity        Please advise

## 2024-12-12 NOTE — TELEPHONE ENCOUNTER
Spoke with pharmacy about medication, updated script verbally to 90 mLs. Script does have refills as well.

## 2024-12-16 ENCOUNTER — TELEPHONE (OUTPATIENT)
Dept: CARDIOLOGY CLINIC | Age: 88
End: 2024-12-16

## 2024-12-16 RX ORDER — TELMISARTAN AND HYDROCHLORTHIAZIDE 80; 25 MG/1; MG/1
1 TABLET ORAL DAILY
Qty: 90 TABLET | Refills: 1 | Status: SHIPPED | OUTPATIENT
Start: 2024-12-16

## 2024-12-16 NOTE — TELEPHONE ENCOUNTER
Pt called stating they called the pharmacy and the pharmacy does not have any refills for the RX   telmisartan-hydroCHLOROthiazide    telmisartan-hydroCHLOROthiazide  (MICARDIS HCT) 80-25 MG per tablet   Take 1 tablet by mouth daily   90 day supply    [id number] - [place], [place] - [address] - P [phone] - F [phone] [address] [facility] [address]  Phone: [phone]  Fax: [phone]

## 2025-01-27 ENCOUNTER — TELEPHONE (OUTPATIENT)
Dept: FAMILY MEDICINE CLINIC | Age: 89
End: 2025-01-27

## 2025-03-24 DIAGNOSIS — M54.50 CHRONIC LOW BACK PAIN WITHOUT SCIATICA, UNSPECIFIED BACK PAIN LATERALITY: ICD-10-CM

## 2025-03-24 DIAGNOSIS — G89.29 CHRONIC LOW BACK PAIN WITHOUT SCIATICA, UNSPECIFIED BACK PAIN LATERALITY: ICD-10-CM

## 2025-03-24 DIAGNOSIS — R42 VERTIGO: ICD-10-CM

## 2025-03-24 RX ORDER — TRAMADOL HYDROCHLORIDE 50 MG/1
50 TABLET ORAL EVERY 6 HOURS PRN
Qty: 90 TABLET | Refills: 0 | Status: SHIPPED | OUTPATIENT
Start: 2025-03-24 | End: 2025-04-23

## 2025-03-24 RX ORDER — LORATADINE 10 MG/1
10 TABLET ORAL DAILY
Qty: 30 TABLET | Refills: 0 | Status: SHIPPED | OUTPATIENT
Start: 2025-03-24

## 2025-03-24 NOTE — TELEPHONE ENCOUNTER
Medication:   Requested Prescriptions     Pending Prescriptions Disp Refills    traMADol (ULTRAM) 50 MG tablet [Pharmacy Med Name: TRAMADOL 50MG TABLETS] 90 tablet      Sig: TAKE 1 TABLET BY MOUTH EVERY 6 HOURS AS NEEDED FOR PAIN. MAX DAILY AMOUNT: 200 MG    loratadine (CLARITIN) 10 MG tablet [Pharmacy Med Name: ALLERGY RELF (LORATADINE) 10MG TABS] 30 tablet 0     Sig: TAKE 1 TABLET BY MOUTH DAILY      Last Filled:  9/4    Patient Phone Number: [phone] (home)     Last appt: 7/18/2024   Next appt: 4/8/2025    Last OARRS:       12/6/2023    10:28 AM   RX Monitoring   Periodic Controlled Substance Monitoring Possible medication side effects, risk of tolerance/dependence & alternative treatments discussed.     PDMP Monitoring:    Last PDMP Bill as Reviewed ([place]):  Review User Review Instant Review Result   PAIGE KANG 12/6/2023 12:25 PM Reviewed PDMP [1]     Preferred Pharmacy:   [facility] #[id number] [place], [place] - [address] - P [phone] - F [phone]  [address] [facility] [address]  Phone: [phone] Fax: [phone]    [facility] - [place], [place] - [address] - P [phone] - F [phone]  [address] [facility] [address]  Phone: [phone] Fax: [phone]

## 2025-03-25 ENCOUNTER — TELEPHONE (OUTPATIENT)
Dept: CARDIOLOGY CLINIC | Age: 89
End: 2025-03-25

## 2025-03-25 DIAGNOSIS — I50.32 CHRONIC HEART FAILURE WITH PRESERVED EJECTION FRACTION (HCC): Primary | ICD-10-CM

## 2025-03-25 RX ORDER — PRAVASTATIN SODIUM 40 MG
40 TABLET ORAL EVERY OTHER DAY
Qty: 45 TABLET | Refills: 3 | Status: SHIPPED | OUTPATIENT
Start: 2025-03-25

## 2025-03-25 RX ORDER — POTASSIUM CHLORIDE 750 MG/1
10 TABLET, EXTENDED RELEASE ORAL DAILY
Qty: 90 TABLET | Refills: 0 | Status: SHIPPED | OUTPATIENT
Start: 2025-03-25

## 2025-03-25 RX ORDER — FUROSEMIDE 20 MG/1
20 TABLET ORAL DAILY
Qty: 90 TABLET | Refills: 0 | Status: SHIPPED | OUTPATIENT
Start: 2025-03-25

## 2025-03-25 NOTE — TELEPHONE ENCOUNTER
Please call patient and ask him to get a renal panel so that we can continue to fill scripts, thanks

## 2025-03-25 NOTE — TELEPHONE ENCOUNTER
Received refill request for Lasix, Klor-con and pravastatin from [facility] pharmacy.    Last ov:06/26/2024 DKW    Last labs:05/15/2024 CMP & Lipid    Last Refill:06/26/2024    Next appointment:05/02/2025 REECE

## 2025-03-26 DIAGNOSIS — I50.32 CHRONIC HEART FAILURE WITH PRESERVED EJECTION FRACTION (HCC): ICD-10-CM

## 2025-03-26 LAB
ALBUMIN SERPL-MCNC: 3.7 G/DL (ref 3.4–5)
ANION GAP SERPL CALCULATED.3IONS-SCNC: 10 MMOL/L (ref 3–16)
BUN SERPL-MCNC: 26 MG/DL (ref 7–20)
CALCIUM SERPL-MCNC: 9.7 MG/DL (ref 8.3–10.6)
CHLORIDE SERPL-SCNC: 102 MMOL/L (ref 99–110)
CO2 SERPL-SCNC: 30 MMOL/L (ref 21–32)
CREAT SERPL-MCNC: 0.9 MG/DL (ref 0.8–1.3)
GFR SERPLBLD CREATININE-BSD FMLA CKD-EPI: 79 ML/MIN/{1.73_M2}
GLUCOSE SERPL-MCNC: 91 MG/DL (ref 70–99)
PHOSPHATE SERPL-MCNC: 3.4 MG/DL (ref 2.5–4.9)
POTASSIUM SERPL-SCNC: 4.5 MMOL/L (ref 3.5–5.1)
SODIUM SERPL-SCNC: 142 MMOL/L (ref 136–145)

## 2025-03-28 ENCOUNTER — RESULTS FOLLOW-UP (OUTPATIENT)
Dept: CARDIOLOGY CLINIC | Age: 89
End: 2025-03-28

## 2025-04-08 ENCOUNTER — OFFICE VISIT (OUTPATIENT)
Dept: FAMILY MEDICINE CLINIC | Age: 89
End: 2025-04-08

## 2025-04-08 VITALS
HEIGHT: 64 IN | HEART RATE: 73 BPM | TEMPERATURE: 99.2 F | DIASTOLIC BLOOD PRESSURE: 68 MMHG | OXYGEN SATURATION: 98 % | RESPIRATION RATE: 18 BRPM | WEIGHT: 172 LBS | SYSTOLIC BLOOD PRESSURE: 122 MMHG | BODY MASS INDEX: 29.37 KG/M2

## 2025-04-08 DIAGNOSIS — I10 ESSENTIAL HYPERTENSION: ICD-10-CM

## 2025-04-08 DIAGNOSIS — Z00.00 MEDICARE ANNUAL WELLNESS VISIT, SUBSEQUENT: Primary | ICD-10-CM

## 2025-04-08 DIAGNOSIS — J96.11 CHRONIC HYPOXEMIC RESPIRATORY FAILURE: ICD-10-CM

## 2025-04-08 DIAGNOSIS — J44.9 COPD, MILD (HCC): ICD-10-CM

## 2025-04-08 DIAGNOSIS — R42 VERTIGO: ICD-10-CM

## 2025-04-08 DIAGNOSIS — E78.2 MIXED HYPERLIPIDEMIA: ICD-10-CM

## 2025-04-08 DIAGNOSIS — R73.9 HYPERGLYCEMIA: ICD-10-CM

## 2025-04-08 DIAGNOSIS — N18.31 STAGE 3A CHRONIC KIDNEY DISEASE (HCC): ICD-10-CM

## 2025-04-08 DIAGNOSIS — R97.20 ELEVATED PROSTATE SPECIFIC ANTIGEN (PSA): ICD-10-CM

## 2025-04-08 DIAGNOSIS — I50.32 CHRONIC HEART FAILURE WITH PRESERVED EJECTION FRACTION (HCC): ICD-10-CM

## 2025-04-08 DIAGNOSIS — F45.8 PSYCHOGENIC PRURITUS: ICD-10-CM

## 2025-04-08 SDOH — ECONOMIC STABILITY: FOOD INSECURITY: WITHIN THE PAST 12 MONTHS, YOU WORRIED THAT YOUR FOOD WOULD RUN OUT BEFORE YOU GOT MONEY TO BUY MORE.: NEVER TRUE

## 2025-04-08 SDOH — ECONOMIC STABILITY: FOOD INSECURITY: WITHIN THE PAST 12 MONTHS, THE FOOD YOU BOUGHT JUST DIDN'T LAST AND YOU DIDN'T HAVE MONEY TO GET MORE.: NEVER TRUE

## 2025-04-08 ASSESSMENT — PATIENT HEALTH QUESTIONNAIRE - PHQ9
SUM OF ALL RESPONSES TO PHQ QUESTIONS 1-9: 0
1. LITTLE INTEREST OR PLEASURE IN DOING THINGS: NOT AT ALL
SUM OF ALL RESPONSES TO PHQ QUESTIONS 1-9: 0
2. FEELING DOWN, DEPRESSED OR HOPELESS: NOT AT ALL

## 2025-04-08 NOTE — PROGRESS NOTES
MEDICARE ANNUAL WELLNESS VISIT    Patient is here for their Medicare Annual Wellness Visit yes    Last eye exam: 4/2024  Last dental exam: 2024  Exercise:  daily, activities of daily living only and walking  Do you eat balanced/healthy meals regularly? Yes    How would you rate your overall health? : Very good          4/8/2025     2:28 PM 3/29/2024     9:26 AM 11/1/2023    12:58 PM 2/21/2023     2:27 PM 12/21/2022    10:28 AM 8/19/2020     2:46 PM 4/30/2019    10:41 AM   Fall Risk   Do you feel unsteady or are you worried about falling?  yes yes yes no no     2 or more falls in past year? yes yes no yes no no no   Fall with injury in past year? yes no no no no no yes           4/8/2025     2:30 PM 3/29/2024     9:26 AM 3/12/2024     1:48 PM 11/1/2023    12:58 PM 2/21/2023     2:28 PM 12/21/2022    10:28 AM 4/23/2021    10:08 AM   PHQ Scores   PHQ2 Score 0 0 4 0 0 0 0   PHQ9 Score 0 0 11 0 0 0 0         Do you always wear a seat belt in the car?: Yes      Have you noted any problems with hearing?: Yes hearing aids  Have you noted any vision problems?: Yes wears glasses all the time  Do you have concerns about your sexual health?: no  In the past month how much has pain been an issue for you?:  Not at all  In the past month have you had issues with anxiety, loneliness, irritability or fatigue:  Not at all    Do you take opioid medications even sometimes? Yes - tramadol for chronic low back pain    Living Will and/or Healthcare POA: Yes,   Copy requested      Healthcare Decision Maker:    Primary Decision Maker: Anabell Hewitt - Spouse - [phone]           Who lives at home with you: spouse  Do you have any pets? none  Do you have any services coming to your home (meals on wheels, home health, etc) ?: no      Do you need help with:  Using the phone:  No  Bathing: No  Dressing:  No  Toileting: No  Transportation:  No  Shopping: No  Preparing meals: No  Housework/Laundry: No  Medications: No  Money management: 
oxygen is used at night, maintaining oxygen saturation levels around 88 to 90 percent. Without the supplemental oxygen, headaches are experienced.    FAMILY HISTORY  No one in his family lived to be over 79. His brother was only 78.       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 ENDOSCOPY    CARPAL TUNNEL RELEASE Bilateral     COCHLEAR IMPLANT  2017    [facility]    COLONOSCOPY      EYE SURGERY      cataract with lens implant    HERNIA REPAIR      rt side    KNEE ARTHROSCOPY Bilateral     THROAT SURGERY      uvula resected, and nasal surgery for sleep apnea    TURP  2007    VASECTOMY       Medications:  Current Outpatient Medications   Medication Sig Dispense Refill    pravastatin (PRAVACHOL) 40 MG tablet TAKE 1 TABLET BY MOUTH EVERY OTHER DAY 45 tablet 3    potassium chloride (KLOR-CON M) 10 MEQ extended release tablet TAKE 1 TABLET BY MOUTH DAILY 90 tablet 0    furosemide (LASIX) 20 MG tablet TAKE 1 TABLET BY MOUTH DAILY 90 tablet 0    traMADol (ULTRAM) 50 MG tablet Take 1 tablet by mouth every 6 hours as needed for Pain for up to 30 days. Max Daily Amount: 200 mg 90 tablet 0    loratadine (CLARITIN) 10 MG tablet TAKE 1 TABLET BY MOUTH DAILY 30 tablet 0    telmisartan-hydroCHLOROthiazide (MICARDIS HCT) 80-25 MG per tablet Take 1 tablet by mouth daily 90 tablet 1    ipratropium 0.5 mg-albuterol 2.5 mg

## 2025-04-29 ASSESSMENT — ENCOUNTER SYMPTOMS
ABDOMINAL DISTENTION: 0
SHORTNESS OF BREATH: 1
COUGH: 0
PHOTOPHOBIA: 0
ABDOMINAL PAIN: 0
BLOOD IN STOOL: 0
NAUSEA: 0
CHEST TIGHTNESS: 0

## 2025-04-29 NOTE — PROGRESS NOTES
telmisartan-hydroCHLOROthiazide (MICARDIS HCT) 80-25 MG per tablet Take 1 tablet by mouth daily 12/16/24  Yes Asim Sutherland DO   ipratropium 0.5 mg-albuterol 2.5 mg (DUONEB) 0.5-2.5 (3) MG/3ML SOLN nebulizer solution USE 3 ML VIA NEBULIZER EVERY 6 HOURS AS NEEDED FOR SHORTNESS OF BREATH 12/12/24  Yes Sandip Palmer Jr., MD   dilTIAZem (CARDIZEM 12 HR) 60 MG extended release capsule Take 1 capsule by mouth 2 times daily  Patient taking differently: Take 1 capsule by mouth daily 6/26/24  Yes Asim Sutherland DO   Powders (GOLD BOND CORNSTARCH PLUS EX) Apply topically   Yes ProviderMaged MD   vitamin D 25 MCG (1000 UT) CAPS Take 1 capsule by mouth nightly   Yes ProviderMaged MD   latanoprost (XALATAN) 0.005 % ophthalmic solution Place 1 drop into both eyes nightly   Yes Provider, MD Maged       PHYSICAL EXAM        Vitals:    05/02/25 1520   BP: 118/60   Pulse: 74   SpO2: 98%        Weight - Scale: 76.2 kg (168 lb)     Gen Alert, cooperative, no distress Heart  Regular rate and rhythm, no murmur   Head Normocephalic, atraumatic, no abnormalities Abd  Soft, NT, +BS, no mass, no OM   Eyes PERRLA, conj/corn clear Ext  Ext nl, AT, no C/C, trace edema   Nose Nares normal, no drain age, Non-tender Pulse 2+ and symmetric   Throat Lips, mucosa, tongue normal Skin Color/text/turg nl, no rash/lesions   Neck S/S, TM, NT, no bruit Psych Nl mood and affect   Lung  +crackles MSK Left shoulder pain with decreased range of motion    Ch wall NT, no deform       LABS and Imaging     Relevant and available CV data reviewed  Echo/MRI: 4/10/21  Normal left ventricular size, wall thickness, and systolic function with   ejection fraction estimated at 60%.   No regional wall motion abnormalities are noted.   Normal diastolic filling pattern for age.   Trivial mitral regurgitation.   Aortic valve appears sclerotic but opens adequately.   Inadequate tricuspid regurgitation to estimate systolic pulmonary artery

## 2025-05-02 ENCOUNTER — OFFICE VISIT (OUTPATIENT)
Dept: CARDIOLOGY CLINIC | Age: 89
End: 2025-05-02
Payer: MEDICARE

## 2025-05-02 VITALS
OXYGEN SATURATION: 98 % | HEART RATE: 74 BPM | SYSTOLIC BLOOD PRESSURE: 118 MMHG | HEIGHT: 64 IN | DIASTOLIC BLOOD PRESSURE: 60 MMHG | WEIGHT: 168 LBS | BODY MASS INDEX: 28.68 KG/M2

## 2025-05-02 DIAGNOSIS — I10 ESSENTIAL HYPERTENSION: ICD-10-CM

## 2025-05-02 DIAGNOSIS — E78.2 MIXED HYPERLIPIDEMIA: ICD-10-CM

## 2025-05-02 DIAGNOSIS — I47.19 PAROXYSMAL ATRIAL TACHYCARDIA: Primary | ICD-10-CM

## 2025-05-02 DIAGNOSIS — I50.32 CHRONIC HEART FAILURE WITH PRESERVED EJECTION FRACTION (HCC): ICD-10-CM

## 2025-05-02 PROCEDURE — 1159F MED LIST DOCD IN RCRD: CPT | Performed by: INTERNAL MEDICINE

## 2025-05-02 PROCEDURE — 1123F ACP DISCUSS/DSCN MKR DOCD: CPT | Performed by: INTERNAL MEDICINE

## 2025-05-02 PROCEDURE — 99214 OFFICE O/P EST MOD 30 MIN: CPT | Performed by: INTERNAL MEDICINE

## 2025-05-02 PROCEDURE — 93000 ELECTROCARDIOGRAM COMPLETE: CPT | Performed by: INTERNAL MEDICINE

## 2025-05-02 RX ORDER — DILTIAZEM HYDROCHLORIDE 60 MG/1
60 CAPSULE, EXTENDED RELEASE ORAL DAILY
Qty: 90 CAPSULE | Refills: 3 | Status: SHIPPED | OUTPATIENT
Start: 2025-05-02

## 2025-05-02 RX ORDER — TELMISARTAN AND HYDROCHLORTHIAZIDE 80; 25 MG/1; MG/1
1 TABLET ORAL DAILY
Qty: 90 TABLET | Refills: 1 | Status: SHIPPED | OUTPATIENT
Start: 2025-05-02

## 2025-05-02 RX ORDER — TRAMADOL HYDROCHLORIDE 50 MG/1
50 TABLET ORAL EVERY 6 HOURS PRN
COMMUNITY

## 2025-05-02 RX ORDER — PRAVASTATIN SODIUM 40 MG
40 TABLET ORAL EVERY OTHER DAY
Qty: 45 TABLET | Refills: 3 | Status: SHIPPED | OUTPATIENT
Start: 2025-05-02

## 2025-05-02 RX ORDER — POTASSIUM CHLORIDE 750 MG/1
10 TABLET, EXTENDED RELEASE ORAL DAILY
Qty: 90 TABLET | Refills: 0 | Status: SHIPPED | OUTPATIENT
Start: 2025-05-02

## 2025-05-12 DIAGNOSIS — M54.50 CHRONIC LOW BACK PAIN WITHOUT SCIATICA, UNSPECIFIED BACK PAIN LATERALITY: ICD-10-CM

## 2025-05-12 DIAGNOSIS — G89.29 CHRONIC LOW BACK PAIN WITHOUT SCIATICA, UNSPECIFIED BACK PAIN LATERALITY: ICD-10-CM

## 2025-05-13 RX ORDER — TRAMADOL HYDROCHLORIDE 50 MG/1
50 TABLET ORAL EVERY 6 HOURS PRN
Qty: 90 TABLET | Refills: 0 | Status: SHIPPED | OUTPATIENT
Start: 2025-05-13 | End: 2025-06-12

## 2025-05-13 NOTE — TELEPHONE ENCOUNTER
Medication:   Requested Prescriptions     Pending Prescriptions Disp Refills    traMADol (ULTRAM) 50 MG tablet [Pharmacy Med Name: TRAMADOL 50MG TABLETS] 90 tablet      Sig: Take 1 tablet by mouth every 6 hours as needed for Pain. Max Daily Amount: 200 mg      Last Filled:  3/24/25    Patient Phone Number: [phone] (home)     Last appt: 4/8/2025   Next appt: Visit date not found    Last OARRS:       12/6/2023    10:28 AM   RX Monitoring   Periodic Controlled Substance Monitoring Possible medication side effects, risk of tolerance/dependence & alternative treatments discussed.     PDMP Monitoring:    Last PDMP Bill as Reviewed ([place]):  Review User Review Instant Review Result   PAIGE KANG 12/6/2023 12:25 PM Reviewed PDMP [1]     Preferred Pharmacy:   [facility] #[id number] [place], [place] - [address] - P [phone] - F [phone]  [address] [facility] [address]  Phone: [phone] Fax: [phone]    [place], [place] - [address] [facility] - P [phone] - F [phone]  [address] [facility] [address]  Phone: [phone] Fax: [phone]

## 2025-05-29 ENCOUNTER — TELEPHONE (OUTPATIENT)
Dept: FAMILY MEDICINE CLINIC | Age: 89
End: 2025-05-29

## 2025-05-29 ENCOUNTER — HOSPITAL ENCOUNTER (INPATIENT)
Age: 89
LOS: 1 days | Discharge: HOME | DRG: 291 | End: 2025-05-30
Attending: EMERGENCY MEDICINE | Admitting: HOSPITALIST
Payer: MEDICARE

## 2025-05-29 ENCOUNTER — APPOINTMENT (OUTPATIENT)
Dept: GENERAL RADIOLOGY | Age: 89
DRG: 291 | End: 2025-05-29
Payer: MEDICARE

## 2025-05-29 DIAGNOSIS — R79.89 ELEVATED TROPONIN: ICD-10-CM

## 2025-05-29 DIAGNOSIS — I50.32 CHRONIC DIASTOLIC CONGESTIVE HEART FAILURE (HCC): ICD-10-CM

## 2025-05-29 DIAGNOSIS — D64.9 ANEMIA, UNSPECIFIED TYPE: ICD-10-CM

## 2025-05-29 DIAGNOSIS — I50.9 CONGESTIVE HEART FAILURE, UNSPECIFIED HF CHRONICITY, UNSPECIFIED HEART FAILURE TYPE (HCC): Primary | ICD-10-CM

## 2025-05-29 PROBLEM — I50.41 ACUTE COMBINED SYSTOLIC AND DIASTOLIC CHF, NYHA CLASS 1 (HCC): Status: ACTIVE | Noted: 2025-05-29

## 2025-05-29 LAB
ALBUMIN SERPL-MCNC: 3.3 G/DL (ref 3.4–5)
ALBUMIN/GLOB SERPL: 0.8 {RATIO} (ref 1.1–2.2)
ALP SERPL-CCNC: 134 U/L (ref 40–129)
ALT SERPL-CCNC: 8 U/L (ref 10–40)
ANION GAP SERPL CALCULATED.3IONS-SCNC: 9 MMOL/L (ref 3–16)
AST SERPL-CCNC: 24 U/L (ref 15–37)
BASE EXCESS BLDV CALC-SCNC: 4.9 MMOL/L (ref -3–3)
BASOPHILS # BLD: 0.1 K/UL (ref 0–0.2)
BASOPHILS NFR BLD: 1.2 %
BILIRUB SERPL-MCNC: 0.6 MG/DL (ref 0–1)
BUN SERPL-MCNC: 26 MG/DL (ref 7–20)
CALCIUM SERPL-MCNC: 9.8 MG/DL (ref 8.3–10.6)
CHLORIDE SERPL-SCNC: 103 MMOL/L (ref 99–110)
CO2 BLDV-SCNC: 70 MMOL/L
CO2 SERPL-SCNC: 27 MMOL/L (ref 21–32)
COHGB MFR BLDV: 3.9 % (ref 0–1.5)
CREAT SERPL-MCNC: 1.1 MG/DL (ref 0.8–1.3)
DEPRECATED RDW RBC AUTO: 17.8 % (ref 12.4–15.4)
EOSINOPHIL # BLD: 0.1 K/UL (ref 0–0.6)
EOSINOPHIL NFR BLD: 1.7 %
FERRITIN SERPL IA-MCNC: 220 NG/ML (ref 30–400)
FLUAV RNA RESP QL NAA+PROBE: NOT DETECTED
FLUBV RNA RESP QL NAA+PROBE: NOT DETECTED
GFR SERPLBLD CREATININE-BSD FMLA CKD-EPI: 62 ML/MIN/{1.73_M2}
GLUCOSE SERPL-MCNC: 106 MG/DL (ref 70–99)
HCO3 BLDV-SCNC: 29.9 MMOL/L (ref 23–29)
HCT VFR BLD AUTO: 28.6 % (ref 40.5–52.5)
HGB BLD-MCNC: 9.3 G/DL (ref 13.5–17.5)
IRON SATN MFR SERPL: 11 % (ref 20–50)
IRON SERPL-MCNC: 22 UG/DL (ref 59–158)
LYMPHOCYTES # BLD: 0.8 K/UL (ref 1–5.1)
LYMPHOCYTES NFR BLD: 16.8 %
MCH RBC QN AUTO: 26.4 PG (ref 26–34)
MCHC RBC AUTO-ENTMCNC: 32.6 G/DL (ref 31–36)
MCV RBC AUTO: 81.1 FL (ref 80–100)
METHGB MFR BLDV: 0.7 %
MONOCYTES # BLD: 1.2 K/UL (ref 0–1.3)
MONOCYTES NFR BLD: 24.5 %
NEUTROPHILS # BLD: 2.7 K/UL (ref 1.7–7.7)
NEUTROPHILS NFR BLD: 55.8 %
NT-PROBNP SERPL-MCNC: 1861 PG/ML (ref 0–449)
O2 CT VFR BLDV CALC: 13 VOL %
O2 THERAPY: ABNORMAL
PCO2 BLDV: 45.8 MMHG (ref 40–50)
PH BLDV: 7.42 [PH] (ref 7.35–7.45)
PLATELET # BLD AUTO: 332 K/UL (ref 135–450)
PMV BLD AUTO: 7.4 FL (ref 5–10.5)
PO2 BLDV: 124 MMHG (ref 25–40)
POTASSIUM SERPL-SCNC: 4.4 MMOL/L (ref 3.5–5.1)
PROT SERPL-MCNC: 7.2 G/DL (ref 6.4–8.2)
RBC # BLD AUTO: 3.53 M/UL (ref 4.2–5.9)
SAO2 % BLDV: 100 %
SARS-COV-2 RNA RESP QL NAA+PROBE: NOT DETECTED
SODIUM SERPL-SCNC: 139 MMOL/L (ref 136–145)
TIBC SERPL-MCNC: 207 UG/DL (ref 260–445)
TROPONIN, HIGH SENSITIVITY: 46 NG/L (ref 0–22)
TROPONIN, HIGH SENSITIVITY: 46 NG/L (ref 0–22)
WBC # BLD AUTO: 4.8 K/UL (ref 4–11)

## 2025-05-29 PROCEDURE — 93005 ELECTROCARDIOGRAM TRACING: CPT | Performed by: EMERGENCY MEDICINE

## 2025-05-29 PROCEDURE — 93005 ELECTROCARDIOGRAM TRACING: CPT | Performed by: INTERNAL MEDICINE

## 2025-05-29 PROCEDURE — 87636 SARSCOV2 & INF A&B AMP PRB: CPT

## 2025-05-29 PROCEDURE — G0378 HOSPITAL OBSERVATION PER HR: HCPCS

## 2025-05-29 PROCEDURE — 2500000003 HC RX 250 WO HCPCS

## 2025-05-29 PROCEDURE — 94640 AIRWAY INHALATION TREATMENT: CPT

## 2025-05-29 PROCEDURE — 82728 ASSAY OF FERRITIN: CPT

## 2025-05-29 PROCEDURE — 99285 EMERGENCY DEPT VISIT HI MDM: CPT

## 2025-05-29 PROCEDURE — 85025 COMPLETE CBC W/AUTO DIFF WBC: CPT

## 2025-05-29 PROCEDURE — 80053 COMPREHEN METABOLIC PANEL: CPT

## 2025-05-29 PROCEDURE — 82803 BLOOD GASES ANY COMBINATION: CPT

## 2025-05-29 PROCEDURE — 6360000002 HC RX W HCPCS

## 2025-05-29 PROCEDURE — 83540 ASSAY OF IRON: CPT

## 2025-05-29 PROCEDURE — 1200000000 HC SEMI PRIVATE

## 2025-05-29 PROCEDURE — 6370000000 HC RX 637 (ALT 250 FOR IP)

## 2025-05-29 PROCEDURE — 96375 TX/PRO/DX INJ NEW DRUG ADDON: CPT

## 2025-05-29 PROCEDURE — 2500000003 HC RX 250 WO HCPCS: Performed by: HOSPITALIST

## 2025-05-29 PROCEDURE — 6360000002 HC RX W HCPCS: Performed by: EMERGENCY MEDICINE

## 2025-05-29 PROCEDURE — 83880 ASSAY OF NATRIURETIC PEPTIDE: CPT

## 2025-05-29 PROCEDURE — 99223 1ST HOSP IP/OBS HIGH 75: CPT | Performed by: INTERNAL MEDICINE

## 2025-05-29 PROCEDURE — 84484 ASSAY OF TROPONIN QUANT: CPT

## 2025-05-29 PROCEDURE — 83550 IRON BINDING TEST: CPT

## 2025-05-29 PROCEDURE — 71045 X-RAY EXAM CHEST 1 VIEW: CPT

## 2025-05-29 PROCEDURE — 6370000000 HC RX 637 (ALT 250 FOR IP): Performed by: HOSPITALIST

## 2025-05-29 PROCEDURE — 96374 THER/PROPH/DIAG INJ IV PUSH: CPT

## 2025-05-29 RX ORDER — ENOXAPARIN SODIUM 100 MG/ML
40 INJECTION SUBCUTANEOUS DAILY
Status: DISCONTINUED | OUTPATIENT
Start: 2025-05-30 | End: 2025-05-30 | Stop reason: HOSPADM

## 2025-05-29 RX ORDER — SODIUM CHLORIDE 0.9 % (FLUSH) 0.9 %
5-40 SYRINGE (ML) INJECTION EVERY 12 HOURS SCHEDULED
Status: DISCONTINUED | OUTPATIENT
Start: 2025-05-29 | End: 2025-05-30 | Stop reason: HOSPADM

## 2025-05-29 RX ORDER — SODIUM CHLORIDE 0.9 % (FLUSH) 0.9 %
5-40 SYRINGE (ML) INJECTION PRN
Status: DISCONTINUED | OUTPATIENT
Start: 2025-05-29 | End: 2025-05-30 | Stop reason: HOSPADM

## 2025-05-29 RX ORDER — IPRATROPIUM BROMIDE AND ALBUTEROL SULFATE 2.5; .5 MG/3ML; MG/3ML
1 SOLUTION RESPIRATORY (INHALATION) ONCE
Status: COMPLETED | OUTPATIENT
Start: 2025-05-29 | End: 2025-05-29

## 2025-05-29 RX ORDER — MAGNESIUM SULFATE IN WATER 40 MG/ML
2000 INJECTION, SOLUTION INTRAVENOUS PRN
Status: DISCONTINUED | OUTPATIENT
Start: 2025-05-29 | End: 2025-05-30 | Stop reason: HOSPADM

## 2025-05-29 RX ORDER — POTASSIUM CHLORIDE 1500 MG/1
40 TABLET, EXTENDED RELEASE ORAL PRN
Status: DISCONTINUED | OUTPATIENT
Start: 2025-05-29 | End: 2025-05-30 | Stop reason: HOSPADM

## 2025-05-29 RX ORDER — ACETAMINOPHEN 325 MG/1
650 TABLET ORAL EVERY 6 HOURS PRN
Status: DISCONTINUED | OUTPATIENT
Start: 2025-05-29 | End: 2025-05-30 | Stop reason: HOSPADM

## 2025-05-29 RX ORDER — ONDANSETRON 2 MG/ML
4 INJECTION INTRAMUSCULAR; INTRAVENOUS EVERY 6 HOURS PRN
Status: DISCONTINUED | OUTPATIENT
Start: 2025-05-29 | End: 2025-05-30 | Stop reason: HOSPADM

## 2025-05-29 RX ORDER — POTASSIUM CHLORIDE 7.45 MG/ML
10 INJECTION INTRAVENOUS PRN
Status: DISCONTINUED | OUTPATIENT
Start: 2025-05-29 | End: 2025-05-30 | Stop reason: HOSPADM

## 2025-05-29 RX ORDER — PRAVASTATIN SODIUM 40 MG
40 TABLET ORAL EVERY OTHER DAY
Status: DISCONTINUED | OUTPATIENT
Start: 2025-05-29 | End: 2025-05-30 | Stop reason: HOSPADM

## 2025-05-29 RX ORDER — FUROSEMIDE 10 MG/ML
80 INJECTION INTRAMUSCULAR; INTRAVENOUS ONCE
Status: COMPLETED | OUTPATIENT
Start: 2025-05-29 | End: 2025-05-29

## 2025-05-29 RX ORDER — POLYETHYLENE GLYCOL 3350 17 G/17G
17 POWDER, FOR SOLUTION ORAL DAILY PRN
Status: DISCONTINUED | OUTPATIENT
Start: 2025-05-29 | End: 2025-05-30 | Stop reason: HOSPADM

## 2025-05-29 RX ORDER — FUROSEMIDE 10 MG/ML
40 INJECTION INTRAMUSCULAR; INTRAVENOUS 2 TIMES DAILY
Status: DISCONTINUED | OUTPATIENT
Start: 2025-05-30 | End: 2025-05-30 | Stop reason: HOSPADM

## 2025-05-29 RX ORDER — SODIUM CHLORIDE 9 MG/ML
INJECTION, SOLUTION INTRAVENOUS PRN
Status: DISCONTINUED | OUTPATIENT
Start: 2025-05-29 | End: 2025-05-30 | Stop reason: HOSPADM

## 2025-05-29 RX ORDER — ONDANSETRON 4 MG/1
4 TABLET, ORALLY DISINTEGRATING ORAL EVERY 8 HOURS PRN
Status: DISCONTINUED | OUTPATIENT
Start: 2025-05-29 | End: 2025-05-30 | Stop reason: HOSPADM

## 2025-05-29 RX ORDER — DILTIAZEM HYDROCHLORIDE 60 MG/1
60 CAPSULE, EXTENDED RELEASE ORAL DAILY
Status: DISCONTINUED | OUTPATIENT
Start: 2025-05-29 | End: 2025-05-30 | Stop reason: HOSPADM

## 2025-05-29 RX ORDER — ACETAMINOPHEN 650 MG/1
650 SUPPOSITORY RECTAL EVERY 6 HOURS PRN
Status: DISCONTINUED | OUTPATIENT
Start: 2025-05-29 | End: 2025-05-30 | Stop reason: HOSPADM

## 2025-05-29 RX ADMIN — SODIUM CHLORIDE, PRESERVATIVE FREE 10 ML: 5 INJECTION INTRAVENOUS at 22:40

## 2025-05-29 RX ADMIN — IPRATROPIUM BROMIDE AND ALBUTEROL SULFATE 1 DOSE: .5; 3 SOLUTION RESPIRATORY (INHALATION) at 10:59

## 2025-05-29 RX ADMIN — WATER 80 MG: 1 INJECTION INTRAMUSCULAR; INTRAVENOUS; SUBCUTANEOUS at 11:17

## 2025-05-29 RX ADMIN — FUROSEMIDE 80 MG: 10 INJECTION, SOLUTION INTRAMUSCULAR; INTRAVENOUS at 14:16

## 2025-05-29 RX ADMIN — ACETAMINOPHEN 650 MG: 325 TABLET ORAL at 22:57

## 2025-05-29 ASSESSMENT — PAIN SCALES - GENERAL
PAINLEVEL_OUTOF10: 0
PAINLEVEL_OUTOF10: 3
PAINLEVEL_OUTOF10: 0

## 2025-05-29 ASSESSMENT — PAIN SCALES - WONG BAKER
WONGBAKER_NUMERICALRESPONSE: NO HURT
WONGBAKER_NUMERICALRESPONSE: NO HURT

## 2025-05-29 ASSESSMENT — PAIN DESCRIPTION - DESCRIPTORS: DESCRIPTORS: ACHING

## 2025-05-29 ASSESSMENT — PAIN - FUNCTIONAL ASSESSMENT: PAIN_FUNCTIONAL_ASSESSMENT: NONE - DENIES PAIN

## 2025-05-29 ASSESSMENT — LIFESTYLE VARIABLES
HOW OFTEN DO YOU HAVE A DRINK CONTAINING ALCOHOL: MONTHLY OR LESS
HOW MANY STANDARD DRINKS CONTAINING ALCOHOL DO YOU HAVE ON A TYPICAL DAY: 1 OR 2

## 2025-05-29 ASSESSMENT — PAIN DESCRIPTION - LOCATION: LOCATION: GENERALIZED

## 2025-05-29 NOTE — ED PROVIDER NOTES
[facility] EMERGENCY DEPARTMENT  EMERGENCY DEPARTMENT ENCOUNTER        Pt Name: Robin Hewitt  MRN: [id number]  Birthdate 5/14/1930  Date of evaluation: 5/29/2025  Provider: Valerie Toussaint PA-C  PCP: Eleonora Wilburn DO  Note Started: 11:33 AM EDT 5/29/25       I have seen and evaluated this patient with my supervising physician Cory Silva MD.      CHIEF COMPLAINT       Chief Complaint   Patient presents with    Shortness of Breath     Pt states increased SOB xfew weeks, can't sleep laying down, hx of COPD       HISTORY OF PRESENT ILLNESS: 1 or more Elements     History From: Patient, wife    Limitations to history : Difficulty hearing    Chief Complaint: Shortness of breath    Robin Hewitt is a 89 y.o. male who presents with shortness of breath that is been going on for the last several weeks.  Patient states that he has a history of COPD, and has had difficulty breathing recently.  He states that his nebulizer was working for his symptoms, but no longer is working.  He states he got a prescription for albuterol which has not helped.  He states it is difficult for him to lie down flat, he has been sleeping in his recliner due to his shortness of breath.  He states he is coughing up phlegm and describes it as a milky texture.  He denies any fever.  He uses a walker at baseline, he shares that he developed vertigo last year and since then has had some falls but he has no new issues with this.  He states his only concern today is the shortness of breath.    Nursing Notes were all reviewed and agreed with or any disagreements were addressed in the HPI.    REVIEW OF SYSTEMS :      Review of Systems    Positives and Pertinent negatives as per HPI.     SURGICAL HISTORY     Past Surgical History:   Procedure Laterality Date    APPENDECTOMY      BRONCHOSCOPY N/A 7/10/2023    BRONCHOSCOPY FOREIGN BODY REMOVAL performed by Bigg Cordon MD at [facility] ENDOSCOPY    CARPAL TUNNEL RELEASE  care.      I am the Primary Clinician of Record.  FINAL IMPRESSION      1. Congestive heart failure, unspecified HF chronicity, unspecified heart failure type (HCC)    2. Elevated troponin    3. Anemia, unspecified type          DISPOSITION/PLAN     DISPOSITION Decision To Admit 05/29/2025 12:16:31 PM   DISPOSITION CONDITION Stable           PATIENT REFERRED TO:  No follow-up provider specified.    DISCHARGE MEDICATIONS:  New Prescriptions    No medications on file       DISCONTINUED MEDICATIONS:  Discontinued Medications    No medications on file              (Please note that portions of this note were completed with a voice recognition program.  Efforts were made to edit the dictations but occasionally words are mis-transcribed.)    Valerie Toussaint PA-C (electronically signed)       Valerie Toussaint PA-C  05/29/25 0848

## 2025-05-29 NOTE — PROGRESS NOTES
4 Eyes Skin Assessment     The patient is being assess for  Admission    I agree that 2 RN's have performed a thorough Head to Toe Skin Assessment on the patient. ALL assessment sites listed below have been assessed.       Areas assessed by both nurses: yes    [x]   Head, Face, and Ears   [x]   Shoulders, Back, and Chest  [x]   Arms, Elbows, and Hands   [x]   Coccyx, Sacrum, and Ischum  [x]   Legs, Feet, and Heels        Does the Patient have Skin Breakdown?  No         Randy Prevention initiated:  No   Wound Care Orders initiated:  No      [facility] nurse consulted for Pressure Injury (Stage 3,4, Unstageable, DTI, NWPT, and Complex wounds):  No      Nurse 1 eSignature: Electronically signed by Kecia Guy RN on 5/29/25 at 6:59 PM EDT    **SHARE this note so that the co-signing nurse is able to place an eSignature**    Nurse 2 eSignature: Electronically signed by Payton Dukes RN on 5/29/25 at 7:00 PM EDT

## 2025-05-29 NOTE — PROGRESS NOTES
Patient seen in ED, room 26.  Admission completed with the following exceptions:  4 Eyes Assessment, Immunizations, Vaccines, Rights and Responsibilities, Orientation to room, Plan of Care, Education/Learning Assessment and Education Plan, white board, height and weight, pain assessment and head to toe assessment.  Patient is alert and oriented X 4.  Patient lives with his wife Anabell and is being admitted for Acute combined systolic and diastolic CHF, NYHA class 1. Patient loves tomato soup, says he has lost 60 pounds because he can't breath after he eats.     Home Medication reconciliation will be/has been completed by Pharmacy Staff.      All patient's and/or family's questions answered.

## 2025-05-29 NOTE — CONSULTS
[facility]   CONSULTATION  [phone]      Chief Complaint   Patient presents with    Shortness of Breath     Pt states increased SOB xfew weeks, can't sleep laying down, hx of COPD            History of Present Illness:  Robin Hewitt is a 89 y.o. patient who presented to the hospital with complaints of shortness of breath and cough. I have been asked to provide consultation regarding further management and testing. He reports that he has orthopnea. He denies any chest pain or pressure. He states that he doesn't want to take his water pills. He states that he feels back to normal after taking lasix.       Past Medical History:   has a past medical history of Afib (HCC), Anxiety, Atrial fibrillation (HCC), Benign essential hypertension, Cervical back pain with evidence of disc disease, Cramps, extremity, Diaphragm paralysis, Elevated prostate specific antigen (PSA), Emphysema, Hypercholesteremia, and Kidney stones.    Surgical History:   has a past surgical history that includes Throat surgery; eye surgery; hernia repair; Colonoscopy; Appendectomy; Vasectomy; Knee arthroscopy (Bilateral); TURP (2007); Cochlear implant (2017); Carpal tunnel release (Bilateral); and bronchoscopy (N/A, 7/10/2023).     Social History:   reports that he has never smoked. He has never been exposed to tobacco smoke. He has never used smokeless tobacco. He reports that he does not drink alcohol and does not use drugs.     Family History:  No family history of premature coronary artery disease, aortic disease, or valve disease.    Home Medications:  Were reviewed and are listed in nursing record. and/or listed below  Prior to Admission medications    Medication Sig Start Date End Date Taking? Authorizing Provider   traMADol (ULTRAM) 50 MG tablet Take 1 tablet by mouth every 6 hours as needed for Pain for up to 30 days. Max Daily Amount: 200 mg 5/13/25 6/12/25  Eleonora Wilburn DO   pravastatin (PRAVACHOL) 40 MG tablet Take 1

## 2025-05-29 NOTE — TELEPHONE ENCOUNTER
Spoke with Patient Via Telephone call, patient having complaints of SOB and Difficulty Breathing not alleviated by prescribed medications. Patient Advised to go to the Hospital is no improvement with 02 and Medications. Patient expressed understanding and states he will go to the hospital.

## 2025-05-29 NOTE — H&P
HOSPITALISTS HISTORY AND PHYSICAL    5/29/2025 12:26 PM    Patient Information:  KATIA RANGEL is a 89 y.o. male [id number]  PCP:  Eleonora Wilburn DO (Tel: [phone] )    Chief complaint:    Chief Complaint   Patient presents with    Shortness of Breath     Pt states increased SOB xfew weeks, can't sleep laying down, hx of COPD        History of Present Illness:  Katia Rangel is a 89 y.o. male who presented with    REVIEW OF SYSTEMS:   .    Past Medical History:   has a past medical history of Afib (HCC), Anxiety, Atrial fibrillation (HCC), Benign essential hypertension, Cervical back pain with evidence of disc disease, Cramps, extremity, Diaphragm paralysis, Elevated prostate specific antigen (PSA), Emphysema, Hypercholesteremia, and Kidney stones.     Past Surgical History:   has a past surgical history that includes Throat surgery; eye surgery; hernia repair; Colonoscopy; Appendectomy; Vasectomy; Knee arthroscopy (Bilateral); TURP (2007); Cochlear implant (2017); Carpal tunnel release (Bilateral); and bronchoscopy (N/A, 7/10/2023).     Medications:  No current facility-administered medications on file prior to encounter.     Current Outpatient Medications on File Prior to Encounter   Medication Sig Dispense Refill    traMADol (ULTRAM) 50 MG tablet Take 1 tablet by mouth every 6 hours as needed for Pain for up to 30 days. Max Daily Amount: 200 mg 90 tablet 0    pravastatin (PRAVACHOL) 40 MG tablet Take 1 tablet by mouth every other day 45 tablet 3    telmisartan-hydroCHLOROthiazide (MICARDIS HCT) 80-25 MG per tablet Take 1 tablet by mouth daily 90 tablet 1    potassium chloride (KLOR-CON M) 10 MEQ extended release tablet Take 1 tablet by mouth daily 90 tablet 0    dilTIAZem (CARDIZEM 12 HR) 60 MG extended release capsule Take 1 capsule by mouth daily 90 capsule  frontal head/Right:

## 2025-05-29 NOTE — ED NOTES
Patient Name: Robin Hewitt  : 1930 89 y.o.  MRN: [id number]  ED Room #: ED-0026/     Chief complaint:   Chief Complaint   Patient presents with    Shortness of Breath     Pt states increased SOB xfew weeks, can't sleep laying down, hx of COPD     Hospital Problem/Diagnosis:   Hospital Problems           Last Modified POA    * (Principal) Acute combined systolic and diastolic CHF, NYHA class 1 ([facility]) 2025 Yes         O2 Flow Rate:O2 Device: None (Room air)   (if applicable)  Cardiac Rhythm:   (if applicable)  Active LDA's:   Peripheral IV 25 Left Antecubital (Active)   Site Assessment Clean, dry & intact 25 1101   Line Status Blood return noted;Brisk blood return;Flushed;Normal saline locked;Specimen collected 25 110   Line Care Connections checked and tightened 25 110   Phlebitis Assessment No symptoms 25   Infiltration Assessment 0 25 1101   Dressing Status New dressing applied;Clean, dry & intact 25 1101   Dressing Type Transparent 25 1101   Dressing Intervention New 25 1101            How does patient ambulate? Front Wheeled Walker    2. How does patient take pills? Whole with Water    3. Is patient alert? Alert    4. Is patient oriented? To Person, To Place, To Time, To Situation, and Follows Commands    5.   Patient arrived from:  home      6. If patient is disoriented or from a Skill Nursing Facility has family been notified of admission? No    7. Patient belongings? Belongings: Cell Phone and Clothing    Disposition of belongings? Kept with Patient     8. Any specific patient or family belongings/needs/dynamics?   a. na    9. Miscellaneous comments/pending orders?  a. Has cochlear implant talks very loud      If there are any additional questions please reach out to the Emergency Department. Call [phone]

## 2025-05-29 NOTE — ED PROVIDER NOTES
I personally saw the patient and made/approved the management plan and take responsibility for the patient management.    For further details of Robin Hewitt's emergency department encounter, please see the advanced practice provider's documentation.    I, Cory Silva MD, am the primary physician provider of record.    CHIEF COMPLAINT  Chief Complaint   Patient presents with    Shortness of Breath     Pt states increased SOB xfew weeks, can't sleep laying down, hx of COPD       Briefly, Robin Hewitt is a 89 y.o. male  who presents to the ED complaining of a few weeks of worsening orthopnea and shortness of breath for couple of weeks.  Denies any significant sputum but has been coughing and a little wheezy as well.  No fevers measured.  No chest pain.    FOCUSED PHYSICAL EXAMINATION  /77   Pulse 89   Temp 97.7 °F (36.5 °C) (Oral)   Resp 15   Ht 1.626 m (5' 4\")   Wt 72.6 kg (160 lb)   SpO2 98%   BMI 27.46 kg/m²    Focused physical examination notable for no acute distress, well-appearing, well-nourished, normal speech and mentation without obvious facial droop, no obvious rash.  No obvious cranial nerve deficits on my initial exam.  Only minimal peripheral edema noted but does have bilateral rales at the bases and mild scattered wheezes and a mild cough.  RRR.      EKG performed in ED:  The 12 lead EKG was interpreted by me independent of a cardiologist as follows:  Rate: normal with a rate of 97  Rhythm: artifact, probably sinus with sinus arrhythmia  Axis: normal  Intervals: narrow QRS normal QTc, suspect normal MO (artifact)  ST segments: no ST elevations or depressions  T waves: no abnormal inversions  Non-specific T wave changes: present  Prior EKG comparison: EKG dated 5/2/25 is not significantly different      RADIOLOGY  Any applicable radiology studies including x-ray, CT, MRI, and/or ultrasound, were reviewed independently by me in addition to the radiologist.  I reviewed all radiology

## 2025-05-30 ENCOUNTER — APPOINTMENT (OUTPATIENT)
Age: 89
DRG: 291 | End: 2025-05-30
Attending: INTERNAL MEDICINE
Payer: MEDICARE

## 2025-05-30 VITALS
HEIGHT: 64 IN | TEMPERATURE: 97.7 F | DIASTOLIC BLOOD PRESSURE: 71 MMHG | SYSTOLIC BLOOD PRESSURE: 131 MMHG | HEART RATE: 94 BPM | OXYGEN SATURATION: 97 % | BODY MASS INDEX: 28.17 KG/M2 | WEIGHT: 165 LBS | RESPIRATION RATE: 18 BRPM

## 2025-05-30 LAB
ALBUMIN SERPL-MCNC: 3.1 G/DL (ref 3.4–5)
ALP SERPL-CCNC: 118 U/L (ref 40–129)
ALT SERPL-CCNC: 6 U/L (ref 10–40)
ANION GAP SERPL CALCULATED.3IONS-SCNC: 12 MMOL/L (ref 3–16)
AST SERPL-CCNC: 16 U/L (ref 15–37)
BILIRUB DIRECT SERPL-MCNC: 0.2 MG/DL (ref 0–0.3)
BILIRUB INDIRECT SERPL-MCNC: 0.3 MG/DL (ref 0–1)
BILIRUB SERPL-MCNC: 0.5 MG/DL (ref 0–1)
BUN SERPL-MCNC: 33 MG/DL (ref 7–20)
CALCIUM SERPL-MCNC: 9.1 MG/DL (ref 8.3–10.6)
CHLORIDE SERPL-SCNC: 100 MMOL/L (ref 99–110)
CHOLEST SERPL-MCNC: 92 MG/DL (ref 0–199)
CO2 SERPL-SCNC: 27 MMOL/L (ref 21–32)
CREAT SERPL-MCNC: 1.1 MG/DL (ref 0.8–1.3)
ECHO AO ASC DIAM: 2.8 CM
ECHO AO ASCENDING AORTA INDEX: 1.56 CM/M2
ECHO AO ROOT DIAM: 3.2 CM
ECHO AO ROOT INDEX: 1.78 CM/M2
ECHO AV AREA PEAK VELOCITY: 1.8 CM2
ECHO AV AREA/BSA PEAK VELOCITY: 1 CM2/M2
ECHO AV PEAK GRADIENT: 16 MMHG
ECHO AV PEAK VELOCITY: 2 M/S
ECHO AV VELOCITY RATIO: 0.55
ECHO BSA: 1.84 M2
ECHO EST RA PRESSURE: 15 MMHG
ECHO IVC INSP: 1.4 CM
ECHO IVC PROX: 2.1 CM
ECHO LA AREA 2C: 27.9 CM2
ECHO LA AREA 4C: 27.1 CM2
ECHO LA MAJOR AXIS: 5.7 CM
ECHO LA MINOR AXIS: 6.1 CM
ECHO LA VOL BP: 108 ML (ref 18–58)
ECHO LA VOL MOD A2C: 107 ML (ref 18–58)
ECHO LA VOL MOD A4C: 104 ML (ref 18–58)
ECHO LA VOL/BSA BIPLANE: 60 ML/M2 (ref 16–34)
ECHO LA VOLUME INDEX MOD A2C: 59 ML/M2 (ref 16–34)
ECHO LA VOLUME INDEX MOD A4C: 58 ML/M2 (ref 16–34)
ECHO LV E' LATERAL VELOCITY: 9.3 CM/S
ECHO LV E' SEPTAL VELOCITY: 8.49 CM/S
ECHO LV EDV 3D: 121 ML
ECHO LV EDV INDEX 3D: 67 ML/M2
ECHO LV EJECTION FRACTION 3D: 61 %
ECHO LV ESV 3D: 47 ML
ECHO LV ESV INDEX 3D: 26 ML/M2
ECHO LV FRACTIONAL SHORTENING: 35 % (ref 28–44)
ECHO LV INTERNAL DIMENSION DIASTOLE INDEX: 2.67 CM/M2
ECHO LV INTERNAL DIMENSION DIASTOLIC: 4.8 CM (ref 4.2–5.9)
ECHO LV INTERNAL DIMENSION SYSTOLIC INDEX: 1.72 CM/M2
ECHO LV INTERNAL DIMENSION SYSTOLIC: 3.1 CM
ECHO LV IVSD: 0.9 CM (ref 0.6–1)
ECHO LV MASS 2D: 126.7 G (ref 88–224)
ECHO LV MASS 3D INDEX: 102.8 G/M2
ECHO LV MASS 3D: 185 G
ECHO LV MASS INDEX 2D: 70.4 G/M2 (ref 49–115)
ECHO LV POSTERIOR WALL DIASTOLIC: 0.7 CM (ref 0.6–1)
ECHO LV RELATIVE WALL THICKNESS RATIO: 0.29
ECHO LVOT AREA: 3.1 CM2
ECHO LVOT DIAM: 2 CM
ECHO LVOT MEAN GRADIENT: 3 MMHG
ECHO LVOT PEAK GRADIENT: 5 MMHG
ECHO LVOT PEAK VELOCITY: 1.1 M/S
ECHO LVOT STROKE VOLUME INDEX: 43.3 ML/M2
ECHO LVOT SV: 77.9 ML
ECHO LVOT VTI: 24.8 CM
ECHO MV A VELOCITY: 0.84 M/S
ECHO MV E VELOCITY: 1.04 M/S
ECHO MV E/A RATIO: 1.24
ECHO MV E/E' LATERAL: 11.18
ECHO MV E/E' RATIO (AVERAGED): 11.72
ECHO MV E/E' SEPTAL: 12.25
ECHO PV MAX VELOCITY: 1 M/S
ECHO PV PEAK GRADIENT: 4 MMHG
ECHO RA AREA 4C: 19.2 CM2
ECHO RA END SYSTOLIC VOLUME APICAL 4 CHAMBER INDEX BSA: 31 ML/M2
ECHO RA VOLUME: 56 ML
ECHO RIGHT VENTRICULAR SYSTOLIC PRESSURE (RVSP): 43 MMHG
ECHO RV BASAL DIMENSION: 4.1 CM
ECHO RV FREE WALL PEAK S': 14.4 CM/S
ECHO RV LONGITUDINAL DIMENSION: 8.1 CM
ECHO RV MID DIMENSION: 2.1 CM
ECHO RV TAPSE: 2.5 CM (ref 1.7–?)
ECHO TV REGURGITANT MAX VELOCITY: 2.64 M/S
ECHO TV REGURGITANT PEAK GRADIENT: 28 MMHG
EKG ATRIAL RATE: 84 BPM
EKG ATRIAL RATE: 97 BPM
EKG DIAGNOSIS: NORMAL
EKG DIAGNOSIS: NORMAL
EKG P AXIS: 90 DEGREES
EKG P-R INTERVAL: 154 MS
EKG Q-T INTERVAL: 346 MS
EKG Q-T INTERVAL: 378 MS
EKG QRS DURATION: 76 MS
EKG QRS DURATION: 78 MS
EKG QTC CALCULATION (BAZETT): 439 MS
EKG QTC CALCULATION (BAZETT): 446 MS
EKG R AXIS: -4 DEGREES
EKG R AXIS: 7 DEGREES
EKG T AXIS: 26 DEGREES
EKG T AXIS: 9 DEGREES
EKG VENTRICULAR RATE: 84 BPM
EKG VENTRICULAR RATE: 97 BPM
GFR SERPLBLD CREATININE-BSD FMLA CKD-EPI: 62 ML/MIN/{1.73_M2}
GLUCOSE SERPL-MCNC: 143 MG/DL (ref 70–99)
HDLC SERPL-MCNC: 38 MG/DL (ref 40–60)
LDLC SERPL CALC-MCNC: 47 MG/DL
MAGNESIUM SERPL-MCNC: 1.91 MG/DL (ref 1.8–2.4)
POTASSIUM SERPL-SCNC: 4.8 MMOL/L (ref 3.5–5.1)
PROT SERPL-MCNC: 6.7 G/DL (ref 6.4–8.2)
SODIUM SERPL-SCNC: 139 MMOL/L (ref 136–145)
TRIGL SERPL-MCNC: 33 MG/DL (ref 0–150)
VLDLC SERPL CALC-MCNC: 7 MG/DL

## 2025-05-30 PROCEDURE — 36415 COLL VENOUS BLD VENIPUNCTURE: CPT

## 2025-05-30 PROCEDURE — 93306 TTE W/DOPPLER COMPLETE: CPT

## 2025-05-30 PROCEDURE — 93010 ELECTROCARDIOGRAM REPORT: CPT | Performed by: INTERNAL MEDICINE

## 2025-05-30 PROCEDURE — 83735 ASSAY OF MAGNESIUM: CPT

## 2025-05-30 PROCEDURE — 80061 LIPID PANEL: CPT

## 2025-05-30 PROCEDURE — 93306 TTE W/DOPPLER COMPLETE: CPT | Performed by: INTERNAL MEDICINE

## 2025-05-30 PROCEDURE — 76376 3D RENDER W/INTRP POSTPROCES: CPT | Performed by: INTERNAL MEDICINE

## 2025-05-30 PROCEDURE — 2500000003 HC RX 250 WO HCPCS

## 2025-05-30 PROCEDURE — 6370000000 HC RX 637 (ALT 250 FOR IP): Performed by: HOSPITALIST

## 2025-05-30 PROCEDURE — 2500000003 HC RX 250 WO HCPCS: Performed by: HOSPITALIST

## 2025-05-30 PROCEDURE — G0378 HOSPITAL OBSERVATION PER HR: HCPCS

## 2025-05-30 PROCEDURE — 96372 THER/PROPH/DIAG INJ SC/IM: CPT

## 2025-05-30 PROCEDURE — 80076 HEPATIC FUNCTION PANEL: CPT

## 2025-05-30 PROCEDURE — 99232 SBSQ HOSP IP/OBS MODERATE 35: CPT | Performed by: INTERNAL MEDICINE

## 2025-05-30 PROCEDURE — 96376 TX/PRO/DX INJ SAME DRUG ADON: CPT

## 2025-05-30 PROCEDURE — 6360000002 HC RX W HCPCS: Performed by: HOSPITALIST

## 2025-05-30 PROCEDURE — 80048 BASIC METABOLIC PNL TOTAL CA: CPT

## 2025-05-30 PROCEDURE — 6360000002 HC RX W HCPCS

## 2025-05-30 RX ADMIN — ENOXAPARIN SODIUM 40 MG: 100 INJECTION SUBCUTANEOUS at 09:28

## 2025-05-30 RX ADMIN — WATER 80 MG: 1 INJECTION INTRAMUSCULAR; INTRAVENOUS; SUBCUTANEOUS at 09:28

## 2025-05-30 RX ADMIN — SODIUM CHLORIDE, PRESERVATIVE FREE 10 ML: 5 INJECTION INTRAVENOUS at 09:28

## 2025-05-30 RX ADMIN — FUROSEMIDE 40 MG: 10 INJECTION, SOLUTION INTRAMUSCULAR; INTRAVENOUS at 09:27

## 2025-05-30 RX ADMIN — DILTIAZEM HYDROCHLORIDE 60 MG: 60 CAPSULE, EXTENDED RELEASE ORAL at 09:28

## 2025-05-30 ASSESSMENT — PAIN SCALES - GENERAL
PAINLEVEL_OUTOF10: 0

## 2025-05-30 ASSESSMENT — PAIN SCALES - WONG BAKER
WONGBAKER_NUMERICALRESPONSE: NO HURT

## 2025-05-30 NOTE — PROGRESS NOTES
Shift assessment completed. Pt waiting for echo to dc per cardiology. Vss. Urinating without complication. Called wife to update. Callight and bed side table within reach.

## 2025-05-30 NOTE — CARE COORDINATION
Discharge Planning:     (CM) reviewed the patient's chart to assess needs. Patient's Readmission Risk Score is 14%. Patient's medical insurance is [facility] Medicare . Patient's PCP is RILEY ALEX.       No needs anticipated, at this time. CM team to follow. Staff to inform CM if additional discharge needs arise.     Electronically signed by Kiya Fitzgerald on 5/30/25 at 8:13 AM EDT

## 2025-05-30 NOTE — DISCHARGE SUMMARY
[facility]    Patient Demographics    Patient. Robin Hewitt  Date of Birth. 5/14/1930  MRN. [id number]     Primary care provider. Eleonora Wilburn DO  (Tel: [phone])    Admit date: 5/29/2025    Discharge date (blank if same as Note Date): 5/30/2025  Note Date: 5/30/2025     Reason for Hospitalization.   Chief Complaint   Patient presents with    Shortness of Breath     Pt states increased SOB xfew weeks, can't sleep laying down, hx of COPD           Problem-based Hospital Course.    Shortness of breath.    Likely multifactorial.  Check pressures and congestion given IV Lasix  Await echocardiogram or by cardiology discharge stable the following day with significant improvement in symptom  Consults.  IP CONSULT TO CARDIOLOGY  IP CONSULT TO SOCIAL WORK  IP CONSULT TO HOME CARE NEEDS    Physical examination on discharge day.   /71   Pulse 94   Temp 97.7 °F (36.5 °C) (Oral)   Resp 18   Ht 1.626 m (5' 4\")   Wt 74.8 kg (165 lb)   SpO2 97%   BMI 28.32 kg/m²   General appearance.  Alert. Looks comfortable.  HEENT. Sclera clear. Moist mucus membranes.  Cardiovascular. Regular rate and rhythm, normal S1, S2. No murmur.   Respiratory. Not using accessory muscles.Clear to auscultation bilaterally, no wheeze.  Gastrointestinal. Abdomen soft, non-tender, not distended, normal bowel sounds  Neurology. Facial symmetry. No speech deficits. Moving all extremities equally.  Extremities. No edema in lower extremities.  Skin. Warm, dry, normal turgor    Condition at time of discharge stable     Medication instructions provided to patient at discharge.     Medication List        CONTINUE taking these medications      dilTIAZem 60 MG extended release capsule  Commonly known as: CARDIZEM 12 HR  Take 1 capsule by mouth daily     furosemide 20 MG tablet  Commonly

## 2025-05-30 NOTE — DISCHARGE INSTR - COC
Continuity of Care Form    Patient Name: Robin Hewitt   :  1930  MRN:  [id number]    Admit date:  2025  Discharge date:  ***    Code Status Order: DNR-CCA   Advance Directives:     Admitting Physician:  Ishmael Ly MD  PCP: Eleonora Wilburn DO    Discharging Nurse: ***  Discharging Hospital Unit/Room#: 5TN-5560/5560-01  Discharging Unit Phone Number: ***    Emergency Contact:   Extended Emergency Contact Information  Primary Emergency Contact: Anabell Hewitt  Address: [address] [place] [address] [facility]  Home Phone: [phone]  Mobile Phone: [phone]  Relation: Spouse  Secondary Emergency Contact: Artie Hewitt  Mobile Phone: [phone]  Relation: Child    Past Surgical History:  Past Surgical History:   Procedure Laterality Date    APPENDECTOMY      BRONCHOSCOPY N/A 7/10/2023    BRONCHOSCOPY FOREIGN BODY REMOVAL performed by Bigg Cordon MD at [facility] ENDOSCOPY    CARPAL TUNNEL RELEASE Bilateral     COCHLEAR IMPLANT      [facility]    COLONOSCOPY      EYE SURGERY      cataract with lens implant    HERNIA REPAIR      rt side    KNEE ARTHROSCOPY Bilateral     THROAT SURGERY      uvula resected, and nasal surgery for sleep apnea    TURP      VASECTOMY         Immunization History:   Immunization History   Administered Date(s) Administered    COVID-19, PFIZER PURPLE top, DILUTE for use, (age 12 y+), 30mcg/0.3mL 2021, 2021, 2021    Influenza 10/10/2012, 2013    Influenza Whole 10/04/2011    Influenza, FLUAD, (age 65 y+), IM, Trivalent PF, 0.5mL 10/31/2019, 10/20/2020    Influenza, FLUZONE High Dose (age 65 y+), IM, Quadv, 0.7mL 2021, 10/12/2022, 10/04/2023    Influenza, FLUZONE High Dose, (age 65 y+), IM, Trivalent PF, 0.5mL 10/28/2017    Pneumococcal, PCV-13, PREVNAR 13, (age 6w+), IM, 0.5mL 2015    Pneumococcal, PCV20, PREVNAR 20, (age 6w+), IM, 0.5mL 2023  / NO:35573}       Date of Last BM: ***    Intake/Output Summary (Last 24 hours) at 2025 0826  Last data filed at 2025 2300  Gross per 24 hour   Intake 60 ml   Output 2350 ml   Net -2290 ml     I/O last 3 completed shifts:  In: 60 [P.O.:60]  Out: 2350 [Urine:2350]    Safety Concerns:     { DARYL Safety Concerns:271369569}    Impairments/Disabilities:      { DARYL Impairments/Disabilities:200964326}    Nutrition Therapy:  Current Nutrition Therapy:   { DARYL Diet List:094011939}    Routes of Feeding: {[facility] DME Other Feedings:964910089}  Liquids: {Slp liquid thickness:23898}  Daily Fluid Restriction: {[facility] DME Yes amt example:180247726}  Last Modified Barium Swallow with Video (Video Swallowing Test): {Done Not Done Date:}    Treatments at the Time of Hospital Discharge:   Respiratory Treatments: ***  Oxygen Therapy:  {Therapy; copd oxygen:60656}  Ventilator:    {[facility] Vent List:681237325}    Rehab Therapies: {THERAPEUTIC INTERVENTION:2109579514}  Weight Bearing Status/Restrictions: {[facility] Weight Bearin}  Other Medical Equipment (for information only, NOT a DME order):  {EQUIPMENT:592224382}  Other Treatments: ***    Patient's personal belongings (please select all that are sent with patient):  {[facility] DME Belongings:345043609}    RN SIGNATURE:  {Esignature:576511298}    CASE MANAGEMENT/SOCIAL WORK SECTION    Inpatient Status Date: ***    Readmission Risk Assessment Score:  [facility] RISK OF UNPLANNED READMISSION 2.0             14.3 Total Score        Discharging to Facility/ Agency   Name:   Address:  Phone:  Fax:    Dialysis Facility (if applicable)   Name:  Address:  Dialysis Schedule:  Phone:  Fax:    / signature: {Esignature:970528092}    PHYSICIAN SECTION    Prognosis: Good    Condition at Discharge: Stable    Rehab Potential (if transferring to Rehab): Good    Recommended Labs or Other Treatments After Discharge:     Physician Certification: I certify the above

## 2025-05-30 NOTE — PROGRESS NOTES
[facility]  Progress notes  [phone]      Chief Complaint   Patient presents with    Shortness of Breath     Pt states increased SOB xfew weeks, can't sleep laying down, hx of COPD            History of Present Illness:  Robin Hewitt is a 89 y.o. patient who presented to the hospital with complaints of shortness of breath and cough. I have been asked to provide consultation regarding further management and testing. He reports that he has orthopnea. He denies any chest pain or pressure. He states that he doesn't want to take his water pills. He states that he feels back to normal after taking lasix.     Subjective: no acute events overnight. No chest pain or pressure. Feels good. Wants to go home    Past Medical History:   has a past medical history of Afib (HCC), Anxiety, Atrial fibrillation (HCC), Benign essential hypertension, Cervical back pain with evidence of disc disease, Cramps, extremity, Diaphragm paralysis, Elevated prostate specific antigen (PSA), Emphysema, Hypercholesteremia, and Kidney stones.    Surgical History:   has a past surgical history that includes Throat surgery; eye surgery; hernia repair; Colonoscopy; Appendectomy; Vasectomy; Knee arthroscopy (Bilateral); TURP (2007); Cochlear implant (2017); Carpal tunnel release (Bilateral); and bronchoscopy (N/A, 7/10/2023).     Social History:   reports that he has never smoked. He has never been exposed to tobacco smoke. He has never used smokeless tobacco. He reports that he does not drink alcohol and does not use drugs.     Family History:  No family history of premature coronary artery disease, aortic disease, or valve disease.    Home Medications:  Were reviewed and are listed in nursing record. and/or listed below  Prior to Admission medications    Medication Sig Start Date End Date Taking? Authorizing Provider   traMADol (ULTRAM) 50 MG tablet Take 1 tablet by mouth every 6 hours as needed for Pain for up to 30 days. Max Daily  injection 5-40 mL  5-40 mL IntraVENous 2 times per day Ishmael Ly MD   10 mL at 05/29/25 2240    sodium chloride flush 0.9 % injection 5-40 mL  5-40 mL IntraVENous PRN Ishmael Ly MD        0.9 % sodium chloride infusion   IntraVENous PRN Ishmael Ly MD        ondansetron (ZOFRAN-ODT) disintegrating tablet 4 mg  4 mg Oral Q8H PRN Ishmael Ly MD        Or    ondansetron (ZOFRAN) injection 4 mg  4 mg IntraVENous Q6H PRN Ishmael Ly MD        polyethylene glycol (GLYCOLAX) packet 17 g  17 g Oral Daily PRN Ishmael Ly MD        acetaminophen (TYLENOL) tablet 650 mg  650 mg Oral Q6H PRN Ishmael Ly MD   650 mg at 05/29/25 2257    Or    acetaminophen (TYLENOL) suppository 650 mg  650 mg Rectal Q6H PRN Ishmael Ly MD        enoxaparin (LOVENOX) injection 40 mg  40 mg SubCUTAneous Daily Ishmael Ly MD        potassium chloride (KLOR-CON M) extended release tablet 40 mEq  40 mEq Oral PRN Ishmael Ly MD        Or    potassium bicarb-citric acid (EFFER-K) effervescent tablet 40 mEq  40 mEq Oral PRN Ishmael Ly MD        Or    potassium chloride 10 mEq/100 mL IVPB (Peripheral Line)  10 mEq IntraVENous PRN Ishmael Ly MD        magnesium sulfate 2000 mg in 50 mL IVPB premix  2,000 mg IntraVENous PRN Ishmael Ly MD        furosemide (LASIX) injection 40 mg  40 mg IntraVENous BID Ishmael Ly MD        sulfur hexafluoride microspheres (LUMASON) 60.7-25 MG injection 2 mL  2 mL IntraVENous ONCE PRN Asim Sutherland DO            Allergies:  Codeine and Timolol maleate       Physical Examination:    Vitals:    05/30/25 0504   BP: 134/66   Pulse: 93   Resp: 16   Temp: 97.8 °F (36.6 °C)   SpO2: 96%    Weight - Scale: 74.8 kg (165 lb)         General Appearance:  Alert, cooperative, no distress, appears stated age   Head:  Normocephalic, without obvious abnormality, atraumatic   Eyes:  PERRL, conjunctiva/corneas clear       Nose: Nares normal, no drainage or sinus tenderness   Throat: Lips,

## 2025-06-02 ENCOUNTER — CARE COORDINATION (OUTPATIENT)
Dept: CASE MANAGEMENT | Age: 89
End: 2025-06-02

## 2025-06-02 DIAGNOSIS — T17.908A FOREIGN BODY ASPIRATION, INITIAL ENCOUNTER: Primary | ICD-10-CM

## 2025-06-02 PROCEDURE — 1111F DSCHRG MED/CURRENT MED MERGE: CPT | Performed by: STUDENT IN AN ORGANIZED HEALTH CARE EDUCATION/TRAINING PROGRAM

## 2025-06-02 NOTE — CARE COORDINATION
Care Transitions Note    Initial Call - Call within 2 business days of discharge: Yes    Patient Current Location:  Home: [address] [place] [id number]    Care Transition Nurse contacted the patient by telephone to perform post hospital discharge assessment, verified name and  as identifiers.  Provided introduction to self, and explanation of the Care Transition Nurse role.    Patient: Robin Hewitt    Patient : 1930   MRN: <[id number]>    Reason for Admission: CHF   Discharge Date: 25  RURS: Readmission Risk Score: 13.6      Last Discharge Facility       Date Complaint Diagnosis Description Type Department Provider    25 Shortness of Breath Congestive heart failure, unspecified HF chronicity, unspecified heart failure type (HCC) ... [facility]; Cory Silva ...            Was this an external facility discharge? No    Additional needs identified to be addressed with provider   No needs identified             Method of communication with provider: none.    Patients top risk factors for readmission: functional cognitive ability, functional physical ability, falls, lack of knowledge about disease, medical condition- , medication management, and polypharmacy    Interventions to address risk factors:   Education:    Review of patient management of conditions/medications:    DME:      Care Summary Note:     SUMMARY  CTN spoke to the pt who reports the following:      -Hx of CHF:  EF% preserved - denies LE / pain and swelling / SOB/CP - reports he weighed himself last night and CTN  provided Pt education on when and how to obtain daily weight, importance of daily weight, tracking information to share with physicians, when to call physician regarding weight changes, and importance of following fluid / sodium restrictions set by physician - encouraged to wear carmen hose and elevate LE when possible - not on supplemental oxygen at  current medications

## 2025-06-03 ENCOUNTER — OFFICE VISIT (OUTPATIENT)
Dept: FAMILY MEDICINE CLINIC | Age: 89
End: 2025-06-03

## 2025-06-03 VITALS — OXYGEN SATURATION: 98 % | DIASTOLIC BLOOD PRESSURE: 74 MMHG | HEART RATE: 55 BPM | SYSTOLIC BLOOD PRESSURE: 132 MMHG

## 2025-06-03 DIAGNOSIS — I50.41 ACUTE COMBINED SYSTOLIC AND DIASTOLIC CHF, NYHA CLASS 1 (HCC): Primary | ICD-10-CM

## 2025-06-03 DIAGNOSIS — D50.9 IRON DEFICIENCY ANEMIA, UNSPECIFIED IRON DEFICIENCY ANEMIA TYPE: ICD-10-CM

## 2025-06-03 DIAGNOSIS — Z09 HOSPITAL DISCHARGE FOLLOW-UP: ICD-10-CM

## 2025-06-03 NOTE — PROGRESS NOTES
Rboin Hewitt  : 1930  Encounter date: 6/3/2025    This gila 89 y.o. male who presents with  Chief Complaint   Patient presents with    Follow-Up from [facility]  SOB. Couldn't sleep laying down. Medication review.        History of present illness:    HPI PT is 89 year old male for hospital follow up, pt went to [facility] on  and discharged home on 25.  Pt initially went for shortness of breath, worse with lying down.    VSS  Labs  Covid/flu- NEG  Hgb- 9.3  Hct- 28.6  RDW- 17.8  Iron- 22  TIBC- 207  Troponin- 46/ 46  BNP- 1861  BUN- 26/ 33    Imaging  Chest xray- Findings most consistent with mild CHF.  Right pleural effusion with  bibasilar atelectasis.  Underlying infiltrate on the right not excluded.     EKG- sinus rhythm    ECHO- Left Ventricle: Normal left ventricular systolic function with a visually estimated EF of 60 - 65%. EF 3D is 61%. Left ventricle size is normal. Normal wall thickness. Normal wall motion. Grade I diastolic dysfunction with normal LAP.    Right Ventricle: Right ventricle size is normal. Normal systolic function.    Tricuspid Valve: Mild regurgitation. Est. RA Pressure is 15 mmHg. RVSP is 43 mmHg.    Left Atrium: Left atrium is severely dilated.    Pericardium: No pericardial effusion.    Cardiology consulted, given IV lasix and discharged home.  Reports feeling better.  Has been advised to be compliant with daily lasix.     Current Outpatient Medications on File Prior to Visit   Medication Sig Dispense Refill    traMADol (ULTRAM) 50 MG tablet Take 1 tablet by mouth every 6 hours as needed for Pain for up to 30 days. Max Daily Amount: 200 mg 90 tablet 0    pravastatin (PRAVACHOL) 40 MG tablet Take 1 tablet by mouth every other day 45 tablet 3    telmisartan-hydroCHLOROthiazide (MICARDIS HCT) 80-25 MG per tablet Take 1 tablet by mouth daily 90 tablet 1    potassium chloride (KLOR-CON M) 10 MEQ extended release tablet Take 1 tablet by mouth daily 90

## 2025-06-09 ENCOUNTER — CARE COORDINATION (OUTPATIENT)
Dept: CASE MANAGEMENT | Age: 89
End: 2025-06-09

## 2025-06-09 NOTE — CARE COORDINATION
contact:  Education:    Review of patient management of conditions/medications:    DME:         Advance Care Planning:   Does patient have an Advance Directive: reviewed during previous call, see note. .    Medication Review:  No changes since last call.     Remote Patient Monitoring:  Offered patient enrollment in the Remote Patient Monitoring (RPM) program for in-home monitoring: Yes, but did not enroll at this time: limited patient ability to navigate RPM/equipment.    Assessments:  Care Transitions Subsequent and Final Call    Subsequent and Final Calls  Care Transitions Interventions  Other Interventions:              Follow Up Appointment:   FRANCK appointment attended as scheduled       Care Transition Nurse provided contact information.  Plan for follow-up call in 11-14 days based on severity of symptoms and risk factors.  Plan for next call: symptom management-   self management-   follow-up appointment-   medication management-       Greta Fernandez RN

## 2025-06-17 ENCOUNTER — CARE COORDINATION (OUTPATIENT)
Dept: CASE MANAGEMENT | Age: 89
End: 2025-06-17

## 2025-06-17 NOTE — CARE COORDINATION
Care Transitions Note    Follow Up Call     Patient: Robin Hewitt                                 Patient : 1930   MRN: <[id number]>                             Reason for Admission: CHF   Discharge Date: 25       RURS: Readmission Risk Score: 13.6     Patient Current Location:  Home: [address] [place], [place] [id number]    [facility] Care Coordinator contacted the patient by telephone. Verified name and  as identifiers.    Additional needs identified to be addressed with provider   No needs identified                 Method of communication with provider: none.    Care Summary Note:   Spoke with patient. He stated he is doing pretty good for a 89 year old man.  lbs., /? He has been steady at this weight. Good appetite and fluid intake. Denies swelling, fever, chills, sob, congestion or cough. Patient reports when his nose gets plugged is when his breathing gets bad. He rinses his nose and use his nebulizer and that does it. Taking his medication as prescribed. No needs at this time.     Plan of care updates since last contact:  Review of patient management of conditions/medications:         Advance Care Planning:   Does patient have an Advance Directive: reviewed during previous call, see note. .    Medication Review:  No changes since last call.     Remote Patient Monitoring:  Offered patient enrollment in the Remote Patient Monitoring (RPM) program for in-home monitoring: Yes, but did not enroll at this time: limited patient ability to navigate RPM/equipment.    Assessments:  Care Transitions Subsequent and Final Call    Subsequent and Final Calls  Do you have any ongoing symptoms?: No  Have your medications changed?: No  Do you have any questions related to your medications?: No  Do you currently have any active services?: No  Do you have any needs or concerns that I can assist you with?: No  Identified Barriers: None  Care Transitions Interventions  Other Interventions:

## 2025-07-02 ENCOUNTER — CARE COORDINATION (OUTPATIENT)
Dept: CASE MANAGEMENT | Age: 89
End: 2025-07-02

## 2025-07-02 NOTE — CARE COORDINATION
Care Transitions Note    Final Call     Patient Current Location:  Home: [address] [place] [address] [place] [facility] [address]    Care Transition Nurse contacted the patient by telephone. Verified name and  as identifiers.    Patient graduated from the Care Transitions program on 25 .  Patient/family verbalizes confidence in the ability to self-manage at this time..      Advance Care Planning:   Does patient have an Advance Directive: reviewed during previous call, see note. .    Handoff:   Patient/family agreeable to ACM outreach.  SERGIO MITCHELL     Care Summary Note:     SUMMARY  CTN spoke to the pt who reports the following:       -Hx of CHF:  EF% preserved - denies LE / pain and swelling / SOB/CP - CTN  provided Pt education on when and how to obtain daily weight, importance of daily weight, tracking information to share with physicians, when to call physician regarding weight changes, and importance of following fluid / sodium restrictions set by physician - encouraged to wear carmen hose and elevate LE when possible - not on supplemental oxygen at baseline - Keep SAO2 >92% -  Will continue to monitor and f/u with physician as recommended  -Hx of COPD:   Not on supplemental oxygen - encouraged to continue COPD meds as prescribed - Will continue to monitor and f/u with physician as recommended  -Hx of HTN:  CTN provided education on importance of monitoring BP prior to taking HTN med - Will continue to monitor and f/u with physician as recommended.  -Assistive device:  using walker to ambulate - will continue to use assistive device to prevent fall /injury.    -Denies fever, chills, nausea, vomiting, cough, congestion, SOB / DB, wheezing, chest pain, LE edema / pain, feeling lightheaded / dizziness, heart palpitations / flutters, fatigue, weakness, falls and s/s of stroke.  -Denies issues with fluid intake (advised to follow fluid restriction recommended by physician d/t hx of HF), appetite, urination, and

## 2025-07-08 ENCOUNTER — CARE COORDINATION (OUTPATIENT)
Dept: CARE COORDINATION | Age: 89
End: 2025-07-08

## 2025-07-08 SDOH — SOCIAL STABILITY: SOCIAL NETWORK: HOW OFTEN DO YOU GET TOGETHER WITH FRIENDS OR RELATIVES?: ONCE A WEEK

## 2025-07-08 SDOH — HEALTH STABILITY: PHYSICAL HEALTH: ON AVERAGE, HOW MANY MINUTES DO YOU ENGAGE IN EXERCISE AT THIS LEVEL?: 10 MIN

## 2025-07-08 SDOH — HEALTH STABILITY: PHYSICAL HEALTH: ON AVERAGE, HOW MANY DAYS PER WEEK DO YOU ENGAGE IN MODERATE TO STRENUOUS EXERCISE (LIKE A BRISK WALK)?: 3 DAYS

## 2025-07-08 SDOH — ECONOMIC STABILITY: FOOD INSECURITY: WITHIN THE PAST 12 MONTHS, YOU WORRIED THAT YOUR FOOD WOULD RUN OUT BEFORE YOU GOT THE MONEY TO BUY MORE.: NEVER TRUE

## 2025-07-08 SDOH — HEALTH STABILITY: MENTAL HEALTH
DO YOU FEEL STRESS - TENSE, RESTLESS, NERVOUS, OR ANXIOUS, OR UNABLE TO SLEEP AT NIGHT BECAUSE YOUR MIND IS TROUBLED ALL THE TIME - THESE DAYS?: ONLY A LITTLE

## 2025-07-08 SDOH — ECONOMIC STABILITY: FOOD INSECURITY: HOW HARD IS IT FOR YOU TO PAY FOR THE VERY BASICS LIKE FOOD, HOUSING, MEDICAL CARE, AND HEATING?: NOT HARD AT ALL

## 2025-07-08 SDOH — SOCIAL STABILITY: SOCIAL INSECURITY: ARE YOU MARRIED, WIDOWED, DIVORCED, SEPARATED, NEVER MARRIED, OR LIVING WITH A PARTNER?: MARRIED

## 2025-07-08 SDOH — SOCIAL STABILITY: SOCIAL NETWORK: HOW OFTEN DO YOU ATTEND CHURCH OR RELIGIOUS SERVICES?: 1 TO 4 TIMES PER YEAR

## 2025-07-08 SDOH — ECONOMIC STABILITY: FOOD INSECURITY: WITHIN THE PAST 12 MONTHS, THE FOOD YOU BOUGHT JUST DIDN'T LAST AND YOU DIDN'T HAVE MONEY TO GET MORE.: NEVER TRUE

## 2025-07-08 SDOH — ECONOMIC STABILITY: TRANSPORTATION INSECURITY: IN THE PAST 12 MONTHS, HAS LACK OF TRANSPORTATION KEPT YOU FROM MEDICAL APPOINTMENTS OR FROM GETTING MEDICATIONS?: NO

## 2025-07-08 SDOH — SOCIAL STABILITY: SOCIAL NETWORK
DO YOU BELONG TO ANY CLUBS OR ORGANIZATIONS SUCH AS CHURCH GROUPS, UNIONS, FRATERNAL OR ATHLETIC GROUPS, OR SCHOOL GROUPS?: NO

## 2025-07-08 SDOH — HEALTH STABILITY: MENTAL HEALTH: HOW MANY DRINKS CONTAINING ALCOHOL DO YOU HAVE ON A TYPICAL DAY WHEN YOU ARE DRINKING?: 1 OR 2

## 2025-07-08 SDOH — HEALTH STABILITY: MENTAL HEALTH: HOW OFTEN DO YOU HAVE A DRINK CONTAINING ALCOHOL?: MONTHLY OR LESS

## 2025-07-08 SDOH — ECONOMIC STABILITY: HOUSING INSECURITY: IN THE LAST 12 MONTHS, WAS THERE A TIME WHEN YOU WERE NOT ABLE TO PAY THE MORTGAGE OR RENT ON TIME?: NO

## 2025-07-08 SDOH — SOCIAL STABILITY: SOCIAL NETWORK: HOW OFTEN DO YOU ATTEND MEETINGS OF THE CLUBS OR ORGANIZATIONS YOU BELONG TO?: NEVER

## 2025-07-08 SDOH — SOCIAL STABILITY: SOCIAL NETWORK
IN A TYPICAL WEEK, HOW MANY TIMES DO YOU TALK ON THE PHONE WITH FAMILY, FRIENDS, OR NEIGHBORS?: MORE THAN THREE TIMES A WEEK

## 2025-07-08 ASSESSMENT — ACTIVITIES OF DAILY LIVING (ADL): LACK_OF_TRANSPORTATION: NO

## 2025-07-08 NOTE — ACP (ADVANCE CARE PLANNING)
Advance Care Planning   General Advance Care Planning (ACP) Conversation    Date of Conversation: 7/8/2025  Conducted with: Patient with Decision Making Capacity  Other persons present: Spouse Anabell    Healthcare Decision Maker:    Primary Decision Maker: OneydaAnabell bowden - Spouse - [phone]      Content/Action Overview:  Has ACP document(s) on file - reflects the patient's care preferences  Reviewed DNR/DNI and patient confirms current DNR status - completed forms on file (place new order if needed)        Length of Voluntary ACP Conversation in minutes:  <16 minutes (Non-Billable)    Kristen Billings, ZOEY MACKN, RN     Ambulatory Care Manager    [facility]    Phone: [phone]    [email or web address]

## 2025-07-08 NOTE — CARE COORDINATION
Heart Failure Education outreach Date/Time: 2025 9:23 AM    Ambulatory Care Manager (ACM) contacted the patient by telephone to perform Ambulatory Care Coordination. Verified name and  with patient as identifiers. Provided introduction to self, and explanation of the Ambulatory Care Manager's role.     ACM reviewed that a Heart Healthy tips for the Summer packet has been sent to [employer]. ACM reviewed CHF zones, daily weights, fluid restriction, the importance of low sodium diet, and healthy tips packet with the patient. Instructed patient to call their Cardiologist if they have a weight gain of 3 lbs in 2 days or 5 lbs in a week.     Patient reminded that there is a physician on call 24 hours a day / 7 days a week should the patient have questions or concerns. The patient verbalized understanding.      
I agree with the Care Coordinator's Plan of Care    Electronically signed by Eleonora Wilburn DO on 7/8/2025 at 9:57 AM     
    Attends [employer] Services: 1 to 4 times per year     Active Member of Clubs or Organizations: No     Attends Club or Organization Meetings: Never     Marital Status:    Intimate Partner Violence: Not on file   Depression: Not at risk (4/8/2025)    PHQ-2     PHQ-2 Score: 0   Housing Stability: Low Risk  (7/8/2025)    Housing Stability Vital Sign     Unable to Pay for Housing in the Last Year: No     Number of Times Moved in the Last Year: 0     Homeless in the Last Year: No   Interpersonal Safety: Not At Risk (5/29/2025)    Interpersonal Safety Domain Source: IP Abuse Screening     Physical abuse: Denies     Verbal abuse: Denies     Emotional abuse: Denies     Financial abuse: Denies     Sexual abuse: Denies   Utilities: Not At Risk (7/8/2025)    [facility] Utilities     Threatened with loss of utilities: No        Medications Reviewed:   Completed during this call    Advance Care Planning:   Reviewed and current  Health Care Decision maker confirmed    Primary Decision Maker: Anabell Hewitt - Spouse - [phone]   DNR-CCA on file     Care Planning:   Not completed during this call    PCP/Specialist follow up:       Follow Up:   Plan for next ACM outreach in approximately 1 week to complete:  - follow up appointment with Butch annual exam 7/14  - Establish Goals & education  - References sent via [employer]: [id number] Zones, DASH, My HF Plan of Care, 2025 Summer CHF Initiative     Patient  is agreeable to this plan.        Kristen YOO, RN     Ambulatory Care Manager    [facility] [employer]    Phone: [phone]    [email or web address]

## 2025-07-15 ENCOUNTER — CARE COORDINATION (OUTPATIENT)
Dept: CARE COORDINATION | Age: 89
End: 2025-07-15

## 2025-07-15 NOTE — CARE COORDINATION
Ambulatory Care Coordination Note     7/15/2025 3:14 PM     Patient Current Location:  Home: [address] [place] [address] [facility] [address]     ACM contacted the patient by telephone. Verified name and  with patient as identifiers.         ACM: Minna Mondragon RN     Challenges to be reviewed by the provider   Additional needs identified to be addressed with provider No  FYI- pt states he had a physical completed that he does yearly for his correction with [facility].  States they were going to send results of the exam to the office.  Pt states they gave him a copy of his EKG to bring in to review with PCP and states \"my heart stopped 3 times\" during the assessment.  I offered him assistance to call and schedule follow up but he said he will call on his own.                  Method of communication with provider: chart routing.    Utilization: Initial Call - N/A    Care Summary Note: ACM completed follow up outreach with pt.  Reports he is doing okay.  States he has been doing well.  States he had an annual assessment for his correction yesterday and it went well.  BP yesterday was 117/80, weight 161 lbs, and O2 was 98%.  Pt states he had an EKG completed.  See yellow box.  Pt states he did not get education in previous [employer] Message but will check again.  Pt denies any changes with his medications.  Pt denies any further questions or concerns at this time.     Offered patient enrollment in the Remote Patient Monitoring (RPM) program for in-home monitoring: Yes, but did not enroll at this time: already monitoring with home equipment.     Assessments Completed:   Congestive Heart Failure Assessment    Are you currently restricting fluids?: No Restriction  Do you understand a low sodium diet?: Yes  Do you understand how to read food labels?: Yes  How many restaurant meals do you eat per week?: 1-2  Do you salt your food before tasting it?: No     No patient-reported symptoms      Symptoms:  None: Yes

## 2025-07-22 ENCOUNTER — CARE COORDINATION (OUTPATIENT)
Dept: CARE COORDINATION | Age: 89
End: 2025-07-22

## 2025-07-22 NOTE — CARE COORDINATION
Ambulatory Care Coordination Note     2025 3:25 PM     Patient Current Location:  Home: [address] [place] [id number]     ACM contacted the patient by telephone. Verified name and  with patient as identifiers.         ACM: Minna Mondragon RN     Challenges to be reviewed by the provider   Additional needs identified to be addressed with provider No  None               Method of communication with provider: none.    Utilization: Patient has not had any utilization since our last call.     Care Summary Note: ACM completed follow up outreach with pt.  Reports he is doing well.  States he takes his vitals everyday and keeps track of them.  States BP is usually 140s/90s.  Denies any HA, blurred vision, CP, or SOB.  Pt reports PCP appt was rescheduled for next Thursday.  Pt denies any further concerns and reports he will reach out if he needs anything agreeable to further follow up after PCP appt.     Offered patient enrollment in the Remote Patient Monitoring (RPM) program for in-home monitoring: Yes, but did not enroll at this time: already monitoring with home equipment.     Assessments Completed:   Congestive Heart Failure Assessment    Are you currently restricting fluids?: No Restriction  Do you understand a low sodium diet?: Yes  Do you understand how to read food labels?: Yes  How many restaurant meals do you eat per week?: 1-2  Do you salt your food before tasting it?: No     No patient-reported symptoms      Symptoms:  None: Yes            ,   COPD Assessment    Does the patient understand envrionmental exposure?: Yes  Is the patient able to verbalize Rescue vs. Long Acting medications?: Yes  Does the patient have a nebulizer?: Yes  Does the patient use a space with inhaled medications?: No     No patient-reported symptoms         Symptoms:  None: Yes            ,   Hypertension - Encounter Level          ,   General Assessment              Medications Reviewed:   Patient denies any changes

## 2025-07-30 NOTE — PROGRESS NOTES
Office Note  2025  Patient Name: Robin Hewitt  MRN: [id number] : 1930    SUBJECTIVE:     CHIEF COMPLAINT:  Chief Complaint   Patient presents with    Follow-up       HISTORY OF PRESENT ILLNESS:  He presents today with the following concerns:  History of Present Illness  The patient presents for evaluation of iron deficiency, neck pain, arthritis, and hypotension.    Iron Deficiency  - Diagnosed with iron deficiency, advised to increase iron intake.  - Initially took OTC iron supplement but discontinued due to hard, black stools.  - Resumed taking it every 2-3 days.  - No gastroenterologist or colonoscopy.  - No black stool or blood in stool except occasional hemorrhoid bleeding.  - No SOB, lightheadedness, or dizziness.    Neck Pain  - Experiences neck pain attributed to aging.  - Sleeps in a chair without a pillow due to breathing difficulties when lying flat.  - No trouble breathing when sitting up.  - Relief from neck pain when leaning back in chair.  - Headaches when neck pain intensifies, takes [facility].    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  - Requests tramadol refill, effective for pain management.  - Informed tramadol is not addictive but should avoid overuse.  - Takes tramadol as needed for shoulder pain.    Hypotension  - Cardiologist advised to maintain BP not exceeding 140.  - BP readings around 119, dropped to 104 at 0400 hours.  - Advised not to worry unless BP drops below 90, experiences dizziness when it does.  - Takes Dramamine daily for dizziness, took one dose before today's appointment due to feeling unsteady.    History of hemorrhoids, managed with Preparation H suppositories and MiraLAX. Symptom-free for past week.    Takes diuretic pill every morning, causes hourly urination until 1700 hours. Experiences incontinence if urination delayed.    Hobbies: Mowing the grass    Sleep: Sleeps in a chair without a pillow, unable to sleep flat on a bed due to breathing

## 2025-07-31 ENCOUNTER — OFFICE VISIT (OUTPATIENT)
Dept: FAMILY MEDICINE CLINIC | Age: 89
End: 2025-07-31

## 2025-07-31 VITALS
DIASTOLIC BLOOD PRESSURE: 56 MMHG | RESPIRATION RATE: 16 BRPM | BODY MASS INDEX: 27.74 KG/M2 | TEMPERATURE: 99.3 F | WEIGHT: 161.6 LBS | SYSTOLIC BLOOD PRESSURE: 112 MMHG | HEART RATE: 83 BPM | OXYGEN SATURATION: 97 %

## 2025-07-31 DIAGNOSIS — D50.9 IRON DEFICIENCY ANEMIA, UNSPECIFIED IRON DEFICIENCY ANEMIA TYPE: Primary | ICD-10-CM

## 2025-07-31 DIAGNOSIS — K64.4 EXTERNAL HEMORRHOID: ICD-10-CM

## 2025-07-31 DIAGNOSIS — M50.90 CERVICAL BACK PAIN WITH EVIDENCE OF DISC DISEASE: ICD-10-CM

## 2025-07-31 DIAGNOSIS — M15.0 PRIMARY OSTEOARTHRITIS INVOLVING MULTIPLE JOINTS: ICD-10-CM

## 2025-07-31 DIAGNOSIS — R97.20 ELEVATED PROSTATE SPECIFIC ANTIGEN (PSA): ICD-10-CM

## 2025-07-31 DIAGNOSIS — I10 ESSENTIAL HYPERTENSION: ICD-10-CM

## 2025-07-31 DIAGNOSIS — I50.32 CHRONIC HEART FAILURE WITH PRESERVED EJECTION FRACTION (HCC): ICD-10-CM

## 2025-07-31 RX ORDER — TRAMADOL HYDROCHLORIDE 50 MG/1
50 TABLET ORAL EVERY 6 HOURS PRN
COMMUNITY
End: 2025-07-31 | Stop reason: SDUPTHER

## 2025-08-01 PROBLEM — I50.41 ACUTE COMBINED SYSTOLIC AND DIASTOLIC CHF, NYHA CLASS 1 (HCC): Status: RESOLVED | Noted: 2025-05-29 | Resolved: 2025-08-01

## 2025-08-01 RX ORDER — TRAMADOL HYDROCHLORIDE 50 MG/1
50 TABLET ORAL EVERY 8 HOURS PRN
Qty: 90 TABLET | Refills: 0 | Status: SHIPPED | OUTPATIENT
Start: 2025-08-01 | End: 2025-08-31

## 2025-08-05 ENCOUNTER — CARE COORDINATION (OUTPATIENT)
Dept: CARE COORDINATION | Age: 89
End: 2025-08-05

## 2025-08-08 RX ORDER — PRAVASTATIN SODIUM 40 MG
40 TABLET ORAL EVERY OTHER DAY
Qty: 90 TABLET | Refills: 3 | Status: SHIPPED | OUTPATIENT
Start: 2025-08-08

## 2025-08-08 RX ORDER — TELMISARTAN AND HYDROCHLORTHIAZIDE 80; 25 MG/1; MG/1
1 TABLET ORAL DAILY
Qty: 90 TABLET | Refills: 1 | Status: SHIPPED | OUTPATIENT
Start: 2025-08-08

## 2025-08-08 RX ORDER — FUROSEMIDE 20 MG/1
20 TABLET ORAL DAILY
Qty: 90 TABLET | Refills: 0 | Status: SHIPPED | OUTPATIENT
Start: 2025-08-08

## 2025-08-08 RX ORDER — DILTIAZEM HYDROCHLORIDE 60 MG/1
60 CAPSULE, EXTENDED RELEASE ORAL 2 TIMES DAILY
Qty: 180 CAPSULE | Refills: 3 | Status: SHIPPED | OUTPATIENT
Start: 2025-08-08

## 2025-08-19 ENCOUNTER — CARE COORDINATION (OUTPATIENT)
Dept: CARE COORDINATION | Age: 89
End: 2025-08-19

## 2025-08-29 ENCOUNTER — CARE COORDINATION (OUTPATIENT)
Dept: CARE COORDINATION | Age: 89
End: 2025-08-29

## 2025-08-29 ENCOUNTER — TELEPHONE (OUTPATIENT)
Dept: FAMILY MEDICINE CLINIC | Age: 89
End: 2025-08-29

## 2025-08-30 PROBLEM — D64.9 ACUTE ANEMIA: Status: ACTIVE | Noted: 2025-08-30

## 2025-08-30 PROBLEM — R06.00 DYSPNEA: Status: ACTIVE | Noted: 2025-08-30

## 2025-08-30 PROBLEM — I48.91 ATRIAL FIBRILLATION, NEW ONSET (HCC): Status: ACTIVE | Noted: 2025-08-30

## 2025-08-30 PROBLEM — L89.159 SACRAL DECUBITUS ULCER: Status: ACTIVE | Noted: 2025-08-30

## 2025-08-30 PROBLEM — N18.31 STAGE 3A CHRONIC KIDNEY DISEASE (HCC): Chronic | Status: ACTIVE | Noted: 2023-12-28

## 2025-08-30 PROBLEM — R79.89 ELEVATED TROPONIN: Status: ACTIVE | Noted: 2025-08-30

## 2025-08-30 PROBLEM — I50.9 ACUTE DECOMPENSATED HEART FAILURE (HCC): Status: ACTIVE | Noted: 2025-08-30

## 2025-08-30 PROBLEM — I50.33 ACUTE ON CHRONIC DIASTOLIC HEART FAILURE (HCC): Status: ACTIVE | Noted: 2025-08-30

## 2025-08-30 PROBLEM — J44.1 COPD EXACERBATION (HCC): Status: ACTIVE | Noted: 2025-08-30

## 2025-09-02 ENCOUNTER — CARE COORDINATION (OUTPATIENT)
Dept: CARE COORDINATION | Age: 89
End: 2025-09-02

## 2025-09-02 PROBLEM — I50.23 CHF (CONGESTIVE HEART FAILURE), NYHA CLASS IV, ACUTE ON CHRONIC, SYSTOLIC (HCC): Status: ACTIVE | Noted: 2025-09-02

## 2025-09-02 PROBLEM — I95.9 HYPOTENSION: Status: ACTIVE | Noted: 2025-09-02

## 2025-09-02 PROBLEM — Z87.442 HISTORY OF NEPHROLITHIASIS: Status: ACTIVE | Noted: 2025-09-02

## 2025-09-02 PROBLEM — L98.422 SACRAL ULCER, WITH FAT LAYER EXPOSED (HCC): Status: ACTIVE | Noted: 2025-09-02

## 2025-09-05 ENCOUNTER — CARE COORDINATION (OUTPATIENT)
Dept: CARE COORDINATION | Age: 89
End: 2025-09-05

## (undated) DEVICE — SINGLE USE SUCTION VALVE MAJ-209: Brand: SINGLE USE SUCTION VALVE (STERILE)

## (undated) DEVICE — GOWN AURORA NONREINF LG: Brand: MEDLINE INDUSTRIES, INC.

## (undated) DEVICE — DEVICE ENDO L200CM SHTH OD1.9MM GRSP HYBRID JAW

## (undated) DEVICE — SINGLE USE BIOPSY VALVE MAJ-210: Brand: SINGLE USE BIOPSY VALVE (STERILE)

## (undated) DEVICE — DEVICE GRSP SHTH L160CM DIA2.5MM S STL 4 PRNG W/ INWARD

## (undated) DEVICE — SYRINGE MED 50ML LUERLOCK TIP

## (undated) DEVICE — SYRINGE MED 10ML POLYPR LUERSLIP TIP FLAT TOP W/O SFTY DISP

## (undated) DEVICE — RETRIEVER ENDOSCP L230CM SHTH DIA2.5MM NET 3X6CM STD FOR

## (undated) DEVICE — SPECIMEN TRAP: Brand: ARGYLE

## (undated) DEVICE — SNARES COLD OVAL 10MM THIN

## (undated) DEVICE — ENDOSCOPIC KIT 6X3/16 FT COLON W/ 1.1 OZ 2 GWN W/O BRSH

## (undated) DEVICE — SINGLE USE GRASPING FORCEPS: Brand: SINGLE USE GRASPING FORCEPS

## (undated) DEVICE — CONMED CHANNEL MASTER PULMONARY AND PEDIATRIC CLEANING BRUSH, 160 CM X 2.0 MM: Brand: CONMED